# Patient Record
Sex: FEMALE | Race: WHITE | Employment: OTHER | URBAN - NONMETROPOLITAN AREA
[De-identification: names, ages, dates, MRNs, and addresses within clinical notes are randomized per-mention and may not be internally consistent; named-entity substitution may affect disease eponyms.]

---

## 2017-02-02 RX ORDER — ATORVASTATIN CALCIUM 40 MG/1
40 TABLET, FILM COATED ORAL DAILY
Qty: 30 TABLET | Refills: 11 | Status: SHIPPED | OUTPATIENT
Start: 2017-02-02 | End: 2018-02-07 | Stop reason: SDUPTHER

## 2017-02-23 ENCOUNTER — OFFICE VISIT (OUTPATIENT)
Dept: CARDIOLOGY | Age: 67
End: 2017-02-23

## 2017-02-23 ENCOUNTER — PROCEDURE VISIT (OUTPATIENT)
Dept: CARDIOLOGY | Age: 67
End: 2017-02-23

## 2017-02-23 VITALS
SYSTOLIC BLOOD PRESSURE: 98 MMHG | DIASTOLIC BLOOD PRESSURE: 56 MMHG | HEIGHT: 62 IN | WEIGHT: 117.8 LBS | RESPIRATION RATE: 18 BRPM | BODY MASS INDEX: 21.68 KG/M2 | HEART RATE: 64 BPM

## 2017-02-23 DIAGNOSIS — I10 ESSENTIAL HYPERTENSION: ICD-10-CM

## 2017-02-23 DIAGNOSIS — I51.9 LV DYSFUNCTION: ICD-10-CM

## 2017-02-23 DIAGNOSIS — I42.0 DILATED CARDIOMYOPATHY (HCC): Primary | ICD-10-CM

## 2017-02-23 DIAGNOSIS — I49.5 SICK SINUS SYNDROME (HCC): ICD-10-CM

## 2017-02-23 DIAGNOSIS — Z95.810 PRESENCE OF AUTOMATIC CARDIOVERTER/DEFIBRILLATOR (AICD): Primary | ICD-10-CM

## 2017-02-23 DIAGNOSIS — I42.8 NONISCHEMIC CARDIOMYOPATHY (HCC): ICD-10-CM

## 2017-02-23 PROCEDURE — 99213 OFFICE O/P EST LOW 20 MIN: CPT | Performed by: INTERNAL MEDICINE

## 2017-02-23 PROCEDURE — 93289 INTERROG DEVICE EVAL HEART: CPT | Performed by: INTERNAL MEDICINE

## 2017-02-23 ASSESSMENT — ENCOUNTER SYMPTOMS
CHEST TIGHTNESS: 0
VOMITING: 0
ABDOMINAL PAIN: 0
SHORTNESS OF BREATH: 0
NAUSEA: 0

## 2017-02-28 LAB — HBA1C MFR BLD: 5.3 %

## 2017-03-07 ENCOUNTER — OFFICE VISIT (OUTPATIENT)
Dept: INTERNAL MEDICINE | Age: 67
End: 2017-03-07

## 2017-03-07 VITALS
RESPIRATION RATE: 16 BRPM | HEART RATE: 78 BPM | BODY MASS INDEX: 21.71 KG/M2 | WEIGHT: 118 LBS | HEIGHT: 62 IN | DIASTOLIC BLOOD PRESSURE: 72 MMHG | SYSTOLIC BLOOD PRESSURE: 108 MMHG

## 2017-03-07 DIAGNOSIS — K55.069 SUPERIOR MESENTERIC VEIN THROMBOSIS (HCC): ICD-10-CM

## 2017-03-07 DIAGNOSIS — I42.8 NONISCHEMIC CARDIOMYOPATHY (HCC): ICD-10-CM

## 2017-03-07 DIAGNOSIS — Z23 NEED FOR VACCINATION: ICD-10-CM

## 2017-03-07 DIAGNOSIS — I49.5 SICK SINUS SYNDROME (HCC): ICD-10-CM

## 2017-03-07 DIAGNOSIS — E78.2 MIXED HYPERLIPIDEMIA: ICD-10-CM

## 2017-03-07 DIAGNOSIS — Z95.810 AUTOMATIC IMPLANTABLE CARDIOVERTER-DEFIBRILLATOR IN SITU: ICD-10-CM

## 2017-03-07 DIAGNOSIS — I34.0 MILD MITRAL REGURGITATION: ICD-10-CM

## 2017-03-07 DIAGNOSIS — I63.9 CEREBRAL INFARCTION, UNSPECIFIED MECHANISM (HCC): ICD-10-CM

## 2017-03-07 DIAGNOSIS — R73.01 ELEVATED FASTING GLUCOSE: Primary | ICD-10-CM

## 2017-03-07 PROCEDURE — G0009 ADMIN PNEUMOCOCCAL VACCINE: HCPCS | Performed by: INTERNAL MEDICINE

## 2017-03-07 PROCEDURE — 3017F COLORECTAL CA SCREEN DOC REV: CPT | Performed by: INTERNAL MEDICINE

## 2017-03-07 PROCEDURE — 1090F PRES/ABSN URINE INCON ASSESS: CPT | Performed by: INTERNAL MEDICINE

## 2017-03-07 PROCEDURE — G8598 ASA/ANTIPLAT THER USED: HCPCS | Performed by: INTERNAL MEDICINE

## 2017-03-07 PROCEDURE — 1036F TOBACCO NON-USER: CPT | Performed by: INTERNAL MEDICINE

## 2017-03-07 PROCEDURE — G8400 PT W/DXA NO RESULTS DOC: HCPCS | Performed by: INTERNAL MEDICINE

## 2017-03-07 PROCEDURE — 90670 PCV13 VACCINE IM: CPT | Performed by: INTERNAL MEDICINE

## 2017-03-07 PROCEDURE — 99214 OFFICE O/P EST MOD 30 MIN: CPT | Performed by: INTERNAL MEDICINE

## 2017-03-07 PROCEDURE — G8427 DOCREV CUR MEDS BY ELIG CLIN: HCPCS | Performed by: INTERNAL MEDICINE

## 2017-03-07 PROCEDURE — G8419 CALC BMI OUT NRM PARAM NOF/U: HCPCS | Performed by: INTERNAL MEDICINE

## 2017-03-07 PROCEDURE — 1123F ACP DISCUSS/DSCN MKR DOCD: CPT | Performed by: INTERNAL MEDICINE

## 2017-03-07 PROCEDURE — 3014F SCREEN MAMMO DOC REV: CPT | Performed by: INTERNAL MEDICINE

## 2017-03-07 PROCEDURE — 4040F PNEUMOC VAC/ADMIN/RCVD: CPT | Performed by: INTERNAL MEDICINE

## 2017-03-07 PROCEDURE — G8484 FLU IMMUNIZE NO ADMIN: HCPCS | Performed by: INTERNAL MEDICINE

## 2017-03-15 PROBLEM — R39.81 FUNCTIONAL URINARY INCONTINENCE: Status: ACTIVE | Noted: 2017-03-15

## 2017-03-16 ENCOUNTER — TELEPHONE (OUTPATIENT)
Dept: INTERNAL MEDICINE | Age: 67
End: 2017-03-16

## 2017-03-23 DIAGNOSIS — Z11.4 SCREENING FOR HIV (HUMAN IMMUNODEFICIENCY VIRUS): ICD-10-CM

## 2017-03-23 DIAGNOSIS — Z11.59 NEED FOR HEPATITIS C SCREENING TEST: ICD-10-CM

## 2017-03-23 DIAGNOSIS — R73.01 ELEVATED FASTING GLUCOSE: ICD-10-CM

## 2017-03-26 ENCOUNTER — HOSPITAL ENCOUNTER (OUTPATIENT)
Age: 67
Setting detail: OBSERVATION
Discharge: HOME | End: 2017-03-27
Attending: EMERGENCY MEDICINE | Admitting: FAMILY MEDICINE
Payer: MEDICARE

## 2017-03-26 ENCOUNTER — APPOINTMENT (OUTPATIENT)
Dept: CT IMAGING | Age: 67
End: 2017-03-26
Payer: MEDICARE

## 2017-03-26 DIAGNOSIS — K56.7 ILEUS (HCC): Primary | ICD-10-CM

## 2017-03-26 LAB
-: ABNORMAL
ABSOLUTE EOS #: 0 K/UL (ref 0–0.4)
ABSOLUTE LYMPH #: 0.7 K/UL (ref 1–4.8)
ABSOLUTE MONO #: 0.3 K/UL (ref 0.1–1.2)
AMORPHOUS: ABNORMAL
ANION GAP SERPL CALCULATED.3IONS-SCNC: 14 MMOL/L (ref 9–17)
BACTERIA: ABNORMAL
BASOPHILS # BLD: 0 % (ref 0–2)
BASOPHILS ABSOLUTE: 0 K/UL (ref 0–0.2)
BILIRUBIN URINE: NEGATIVE
BUN BLDV-MCNC: 27 MG/DL (ref 8–23)
BUN/CREAT BLD: 36 (ref 9–20)
CALCIUM SERPL-MCNC: 9.6 MG/DL (ref 8.6–10.4)
CASTS UA: ABNORMAL /LPF (ref 0–2)
CHLORIDE BLD-SCNC: 103 MMOL/L (ref 98–107)
CO2: 26 MMOL/L (ref 20–31)
COLOR: ABNORMAL
COMMENT UA: ABNORMAL
CREAT SERPL-MCNC: 0.75 MG/DL (ref 0.5–0.9)
CRYSTALS, UA: ABNORMAL /HPF
DIFFERENTIAL TYPE: ABNORMAL
DIRECT EXAM: NORMAL
EOSINOPHILS RELATIVE PERCENT: 0 % (ref 1–4)
EPITHELIAL CELLS UA: ABNORMAL /HPF (ref 0–5)
GFR AFRICAN AMERICAN: >60 ML/MIN
GFR NON-AFRICAN AMERICAN: >60 ML/MIN
GFR SERPL CREATININE-BSD FRML MDRD: ABNORMAL ML/MIN/{1.73_M2}
GFR SERPL CREATININE-BSD FRML MDRD: ABNORMAL ML/MIN/{1.73_M2}
GLUCOSE BLD-MCNC: 118 MG/DL (ref 70–99)
GLUCOSE URINE: NEGATIVE
HCT VFR BLD CALC: 43.2 % (ref 36–46)
HEMOGLOBIN: 14.1 G/DL (ref 12–16)
KETONES, URINE: ABNORMAL
LEUKOCYTE ESTERASE, URINE: ABNORMAL
LYMPHOCYTES # BLD: 6 % (ref 24–44)
Lab: NORMAL
MCH RBC QN AUTO: 30.7 PG (ref 26–34)
MCHC RBC AUTO-ENTMCNC: 32.6 G/DL (ref 31–37)
MCV RBC AUTO: 94.1 FL (ref 80–100)
MONOCYTES # BLD: 3 % (ref 1–7)
MUCUS: ABNORMAL
NITRITE, URINE: NEGATIVE
OTHER OBSERVATIONS UA: ABNORMAL
PDW BLD-RTO: 14.2 % (ref 11–14.5)
PH UA: 5.5 (ref 5–6)
PLATELET # BLD: 151 K/UL (ref 140–450)
PLATELET ESTIMATE: ABNORMAL
PMV BLD AUTO: 9.4 FL (ref 6–12)
POTASSIUM SERPL-SCNC: 4.2 MMOL/L (ref 3.7–5.3)
PROTEIN UA: ABNORMAL
RBC # BLD: 4.59 M/UL (ref 4–5.2)
RBC # BLD: ABNORMAL 10*6/UL
RBC UA: ABNORMAL /HPF (ref 0–4)
RENAL EPITHELIAL, UA: ABNORMAL /HPF
SEG NEUTROPHILS: 91 % (ref 36–66)
SEGMENTED NEUTROPHILS ABSOLUTE COUNT: 10.2 K/UL (ref 1.8–7.7)
SODIUM BLD-SCNC: 143 MMOL/L (ref 135–144)
SPECIFIC GRAVITY UA: 1.03 (ref 1.01–1.02)
SPECIMEN DESCRIPTION: NORMAL
STATUS: NORMAL
TRICHOMONAS: ABNORMAL
TURBIDITY: ABNORMAL
URINE HGB: ABNORMAL
UROBILINOGEN, URINE: NORMAL
WBC # BLD: 11.2 K/UL (ref 3.5–11)
WBC # BLD: ABNORMAL 10*3/UL
WBC UA: ABNORMAL /HPF (ref 0–4)
YEAST: ABNORMAL

## 2017-03-26 PROCEDURE — 96360 HYDRATION IV INFUSION INIT: CPT

## 2017-03-26 PROCEDURE — 74176 CT ABD & PELVIS W/O CONTRAST: CPT

## 2017-03-26 PROCEDURE — 96361 HYDRATE IV INFUSION ADD-ON: CPT

## 2017-03-26 PROCEDURE — 36415 COLL VENOUS BLD VENIPUNCTURE: CPT

## 2017-03-26 PROCEDURE — G0378 HOSPITAL OBSERVATION PER HR: HCPCS

## 2017-03-26 PROCEDURE — 85025 COMPLETE CBC W/AUTO DIFF WBC: CPT

## 2017-03-26 PROCEDURE — 87205 SMEAR GRAM STAIN: CPT

## 2017-03-26 PROCEDURE — G8979 MOBILITY GOAL STATUS: HCPCS | Performed by: PHYSICAL THERAPIST

## 2017-03-26 PROCEDURE — 97163 PT EVAL HIGH COMPLEX 45 MIN: CPT | Performed by: PHYSICAL THERAPIST

## 2017-03-26 PROCEDURE — 2580000003 HC RX 258: Performed by: FAMILY MEDICINE

## 2017-03-26 PROCEDURE — 97116 GAIT TRAINING THERAPY: CPT | Performed by: PHYSICAL THERAPIST

## 2017-03-26 PROCEDURE — G8978 MOBILITY CURRENT STATUS: HCPCS | Performed by: PHYSICAL THERAPIST

## 2017-03-26 PROCEDURE — G8988 SELF CARE GOAL STATUS: HCPCS

## 2017-03-26 PROCEDURE — 6370000000 HC RX 637 (ALT 250 FOR IP): Performed by: FAMILY MEDICINE

## 2017-03-26 PROCEDURE — 74176 CT ABD & PELVIS W/O CONTRAST: CPT | Performed by: RADIOLOGY

## 2017-03-26 PROCEDURE — 87324 CLOSTRIDIUM AG IA: CPT

## 2017-03-26 PROCEDURE — 80048 BASIC METABOLIC PNL TOTAL CA: CPT

## 2017-03-26 PROCEDURE — 2580000003 HC RX 258: Performed by: EMERGENCY MEDICINE

## 2017-03-26 PROCEDURE — 99285 EMERGENCY DEPT VISIT HI MDM: CPT

## 2017-03-26 PROCEDURE — 81001 URINALYSIS AUTO W/SCOPE: CPT

## 2017-03-26 PROCEDURE — 94760 N-INVAS EAR/PLS OXIMETRY 1: CPT

## 2017-03-26 PROCEDURE — 87070 CULTURE OTHR SPECIMN AEROBIC: CPT

## 2017-03-26 PROCEDURE — G8980 MOBILITY D/C STATUS: HCPCS | Performed by: PHYSICAL THERAPIST

## 2017-03-26 RX ORDER — ATORVASTATIN CALCIUM 40 MG/1
40 TABLET, FILM COATED ORAL DAILY
Status: DISCONTINUED | OUTPATIENT
Start: 2017-03-26 | End: 2017-03-27 | Stop reason: HOSPADM

## 2017-03-26 RX ORDER — ALBUTEROL SULFATE 90 UG/1
2 AEROSOL, METERED RESPIRATORY (INHALATION) EVERY 4 HOURS PRN
Status: DISCONTINUED | OUTPATIENT
Start: 2017-03-26 | End: 2017-03-27 | Stop reason: HOSPADM

## 2017-03-26 RX ORDER — ACETAMINOPHEN 325 MG/1
650 TABLET ORAL EVERY 4 HOURS PRN
Status: DISCONTINUED | OUTPATIENT
Start: 2017-03-26 | End: 2017-03-27 | Stop reason: HOSPADM

## 2017-03-26 RX ORDER — MECLIZINE HCL 12.5 MG/1
25 TABLET ORAL 3 TIMES DAILY PRN
Status: DISCONTINUED | OUTPATIENT
Start: 2017-03-26 | End: 2017-03-27 | Stop reason: HOSPADM

## 2017-03-26 RX ORDER — SODIUM CHLORIDE 9 MG/ML
INJECTION, SOLUTION INTRAVENOUS CONTINUOUS
Status: DISCONTINUED | OUTPATIENT
Start: 2017-03-26 | End: 2017-03-27 | Stop reason: HOSPADM

## 2017-03-26 RX ORDER — SODIUM CHLORIDE 0.9 % (FLUSH) 0.9 %
10 SYRINGE (ML) INJECTION EVERY 12 HOURS SCHEDULED
Status: DISCONTINUED | OUTPATIENT
Start: 2017-03-26 | End: 2017-03-27 | Stop reason: HOSPADM

## 2017-03-26 RX ORDER — MORPHINE SULFATE 4 MG/ML
4 INJECTION, SOLUTION INTRAMUSCULAR; INTRAVENOUS
Status: DISCONTINUED | OUTPATIENT
Start: 2017-03-26 | End: 2017-03-27 | Stop reason: HOSPADM

## 2017-03-26 RX ORDER — ASPIRIN 81 MG/1
81 TABLET ORAL DAILY
Status: DISCONTINUED | OUTPATIENT
Start: 2017-03-26 | End: 2017-03-27 | Stop reason: HOSPADM

## 2017-03-26 RX ORDER — ONDANSETRON 2 MG/ML
4 INJECTION INTRAMUSCULAR; INTRAVENOUS EVERY 6 HOURS PRN
Status: DISCONTINUED | OUTPATIENT
Start: 2017-03-26 | End: 2017-03-27 | Stop reason: HOSPADM

## 2017-03-26 RX ORDER — 0.9 % SODIUM CHLORIDE 0.9 %
1000 INTRAVENOUS SOLUTION INTRAVENOUS ONCE
Status: COMPLETED | OUTPATIENT
Start: 2017-03-26 | End: 2017-03-26

## 2017-03-26 RX ORDER — SODIUM CHLORIDE 0.9 % (FLUSH) 0.9 %
10 SYRINGE (ML) INJECTION PRN
Status: DISCONTINUED | OUTPATIENT
Start: 2017-03-26 | End: 2017-03-27 | Stop reason: HOSPADM

## 2017-03-26 RX ORDER — MORPHINE SULFATE 2 MG/ML
2 INJECTION, SOLUTION INTRAMUSCULAR; INTRAVENOUS
Status: DISCONTINUED | OUTPATIENT
Start: 2017-03-26 | End: 2017-03-27 | Stop reason: HOSPADM

## 2017-03-26 RX ADMIN — SODIUM CHLORIDE: 9 INJECTION, SOLUTION INTRAVENOUS at 14:23

## 2017-03-26 RX ADMIN — ASPIRIN 81 MG: 81 TABLET, COATED ORAL at 14:18

## 2017-03-26 RX ADMIN — RIVAROXABAN 20 MG: 10 TABLET, FILM COATED ORAL at 14:18

## 2017-03-26 RX ADMIN — SODIUM CHLORIDE 1000 ML: 9 INJECTION, SOLUTION INTRAVENOUS at 10:50

## 2017-03-26 RX ADMIN — SODIUM CHLORIDE: 9 INJECTION, SOLUTION INTRAVENOUS at 23:09

## 2017-03-26 RX ADMIN — ATORVASTATIN CALCIUM 40 MG: 40 TABLET, FILM COATED ORAL at 20:21

## 2017-03-26 ASSESSMENT — ENCOUNTER SYMPTOMS: DIARRHEA: 1

## 2017-03-27 ENCOUNTER — APPOINTMENT (OUTPATIENT)
Dept: GENERAL RADIOLOGY | Age: 67
End: 2017-03-27
Payer: MEDICARE

## 2017-03-27 VITALS
OXYGEN SATURATION: 100 % | SYSTOLIC BLOOD PRESSURE: 126 MMHG | TEMPERATURE: 97.2 F | HEIGHT: 62 IN | BODY MASS INDEX: 21.92 KG/M2 | HEART RATE: 77 BPM | DIASTOLIC BLOOD PRESSURE: 61 MMHG | WEIGHT: 119.1 LBS | RESPIRATION RATE: 16 BRPM

## 2017-03-27 LAB
ABSOLUTE EOS #: 0 K/UL (ref 0–0.4)
ABSOLUTE LYMPH #: 1.1 K/UL (ref 1–4.8)
ABSOLUTE MONO #: 0.3 K/UL (ref 0.1–1.2)
ANION GAP SERPL CALCULATED.3IONS-SCNC: 12 MMOL/L (ref 9–17)
BASOPHILS # BLD: 0 % (ref 0–2)
BASOPHILS ABSOLUTE: 0 K/UL (ref 0–0.2)
BUN BLDV-MCNC: 18 MG/DL (ref 8–23)
BUN/CREAT BLD: 32 (ref 9–20)
CALCIUM SERPL-MCNC: 8.4 MG/DL (ref 8.6–10.4)
CHLORIDE BLD-SCNC: 108 MMOL/L (ref 98–107)
CO2: 22 MMOL/L (ref 20–31)
CREAT SERPL-MCNC: 0.57 MG/DL (ref 0.5–0.9)
DIFFERENTIAL TYPE: ABNORMAL
EOSINOPHILS RELATIVE PERCENT: 1 % (ref 1–4)
GFR AFRICAN AMERICAN: >60 ML/MIN
GFR NON-AFRICAN AMERICAN: >60 ML/MIN
GFR SERPL CREATININE-BSD FRML MDRD: ABNORMAL ML/MIN/{1.73_M2}
GFR SERPL CREATININE-BSD FRML MDRD: ABNORMAL ML/MIN/{1.73_M2}
GLUCOSE BLD-MCNC: 85 MG/DL (ref 70–99)
HCT VFR BLD CALC: 34.8 % (ref 36–46)
HEMOGLOBIN: 11.2 G/DL (ref 12–16)
LYMPHOCYTES # BLD: 21 % (ref 24–44)
MCH RBC QN AUTO: 30.9 PG (ref 26–34)
MCHC RBC AUTO-ENTMCNC: 32.4 G/DL (ref 31–37)
MCV RBC AUTO: 95.5 FL (ref 80–100)
MONOCYTES # BLD: 6 % (ref 1–7)
PDW BLD-RTO: 14.4 % (ref 11–14.5)
PLATELET # BLD: 135 K/UL (ref 140–450)
PLATELET ESTIMATE: ABNORMAL
PMV BLD AUTO: 10 FL (ref 6–12)
POTASSIUM SERPL-SCNC: 4.2 MMOL/L (ref 3.7–5.3)
RBC # BLD: 3.64 M/UL (ref 4–5.2)
RBC # BLD: ABNORMAL 10*6/UL
SEG NEUTROPHILS: 72 % (ref 36–66)
SEGMENTED NEUTROPHILS ABSOLUTE COUNT: 3.8 K/UL (ref 1.8–7.7)
SODIUM BLD-SCNC: 142 MMOL/L (ref 135–144)
WBC # BLD: 5.3 K/UL (ref 3.5–11)
WBC # BLD: ABNORMAL 10*3/UL

## 2017-03-27 PROCEDURE — 74022 RADEX COMPL AQT ABD SERIES: CPT

## 2017-03-27 PROCEDURE — 6370000000 HC RX 637 (ALT 250 FOR IP): Performed by: FAMILY MEDICINE

## 2017-03-27 PROCEDURE — G8987 SELF CARE CURRENT STATUS: HCPCS | Performed by: OCCUPATIONAL THERAPIST

## 2017-03-27 PROCEDURE — 2580000003 HC RX 258: Performed by: FAMILY MEDICINE

## 2017-03-27 PROCEDURE — 97165 OT EVAL LOW COMPLEX 30 MIN: CPT | Performed by: OCCUPATIONAL THERAPIST

## 2017-03-27 PROCEDURE — 36415 COLL VENOUS BLD VENIPUNCTURE: CPT

## 2017-03-27 PROCEDURE — G9162 LANG EXPRESS CURRENT STATUS: HCPCS | Performed by: SPEECH-LANGUAGE PATHOLOGIST

## 2017-03-27 PROCEDURE — 80048 BASIC METABOLIC PNL TOTAL CA: CPT

## 2017-03-27 PROCEDURE — 74022 RADEX COMPL AQT ABD SERIES: CPT | Performed by: RADIOLOGY

## 2017-03-27 PROCEDURE — 99217 PR OBSERVATION CARE DISCHARGE MANAGEMENT: CPT | Performed by: FAMILY MEDICINE

## 2017-03-27 PROCEDURE — G8988 SELF CARE GOAL STATUS: HCPCS

## 2017-03-27 PROCEDURE — G9164 LANG EXPRESS D/C STATUS: HCPCS | Performed by: SPEECH-LANGUAGE PATHOLOGIST

## 2017-03-27 PROCEDURE — 92523 SPEECH SOUND LANG COMPREHEN: CPT | Performed by: SPEECH-LANGUAGE PATHOLOGIST

## 2017-03-27 PROCEDURE — G0378 HOSPITAL OBSERVATION PER HR: HCPCS

## 2017-03-27 PROCEDURE — 85025 COMPLETE CBC W/AUTO DIFF WBC: CPT

## 2017-03-27 PROCEDURE — G9163 LANG EXPRESS GOAL STATUS: HCPCS

## 2017-03-27 PROCEDURE — 94760 N-INVAS EAR/PLS OXIMETRY 1: CPT

## 2017-03-27 PROCEDURE — G8989 SELF CARE D/C STATUS: HCPCS | Performed by: OCCUPATIONAL THERAPIST

## 2017-03-27 RX ADMIN — ASPIRIN 81 MG: 81 TABLET, COATED ORAL at 09:28

## 2017-03-27 RX ADMIN — SODIUM CHLORIDE: 9 INJECTION, SOLUTION INTRAVENOUS at 05:58

## 2017-03-27 RX ADMIN — ATORVASTATIN CALCIUM 40 MG: 40 TABLET, FILM COATED ORAL at 09:30

## 2017-03-27 RX ADMIN — RIVAROXABAN 20 MG: 10 TABLET, FILM COATED ORAL at 09:28

## 2017-03-27 ASSESSMENT — PAIN SCALES - GENERAL
PAINLEVEL_OUTOF10: 0

## 2017-03-28 ENCOUNTER — TELEPHONE (OUTPATIENT)
Dept: INTERNAL MEDICINE | Age: 67
End: 2017-03-28

## 2017-04-02 LAB
CULTURE: NORMAL
CULTURE: NORMAL
DIRECT EXAM: NORMAL
Lab: NORMAL
SPECIMEN DESCRIPTION: NORMAL
SPECIMEN DESCRIPTION: NORMAL
STATUS: NORMAL

## 2017-04-03 ENCOUNTER — OFFICE VISIT (OUTPATIENT)
Dept: INTERNAL MEDICINE | Age: 67
End: 2017-04-03
Payer: MEDICARE

## 2017-04-03 VITALS
DIASTOLIC BLOOD PRESSURE: 86 MMHG | RESPIRATION RATE: 18 BRPM | HEIGHT: 62 IN | BODY MASS INDEX: 21.38 KG/M2 | SYSTOLIC BLOOD PRESSURE: 126 MMHG | WEIGHT: 116.2 LBS | TEMPERATURE: 97.9 F | HEART RATE: 60 BPM

## 2017-04-03 DIAGNOSIS — R19.7 DIARRHEA, UNSPECIFIED TYPE: Primary | ICD-10-CM

## 2017-04-03 DIAGNOSIS — K56.7 ILEUS (HCC): ICD-10-CM

## 2017-04-03 PROCEDURE — 99495 TRANSJ CARE MGMT MOD F2F 14D: CPT | Performed by: INTERNAL MEDICINE

## 2017-07-02 ENCOUNTER — APPOINTMENT (OUTPATIENT)
Dept: CT IMAGING | Age: 67
End: 2017-07-02
Payer: MEDICARE

## 2017-07-02 ENCOUNTER — HOSPITAL ENCOUNTER (EMERGENCY)
Age: 67
Discharge: HOME | End: 2017-07-02
Attending: EMERGENCY MEDICINE
Payer: MEDICARE

## 2017-07-02 ENCOUNTER — APPOINTMENT (OUTPATIENT)
Dept: GENERAL RADIOLOGY | Age: 67
End: 2017-07-02
Payer: MEDICARE

## 2017-07-02 VITALS
SYSTOLIC BLOOD PRESSURE: 138 MMHG | TEMPERATURE: 97.3 F | HEART RATE: 72 BPM | RESPIRATION RATE: 18 BRPM | DIASTOLIC BLOOD PRESSURE: 66 MMHG | OXYGEN SATURATION: 98 %

## 2017-07-02 DIAGNOSIS — N39.0 URINARY TRACT INFECTION, SITE UNSPECIFIED: ICD-10-CM

## 2017-07-02 DIAGNOSIS — R42 DIZZINESS: Primary | ICD-10-CM

## 2017-07-02 LAB
-: ABNORMAL
ABSOLUTE EOS #: 0.1 K/UL (ref 0–0.4)
ABSOLUTE LYMPH #: 1.8 K/UL (ref 1–4.8)
ABSOLUTE MONO #: 0.3 K/UL (ref 0.1–1.2)
AMORPHOUS: ABNORMAL
ANION GAP SERPL CALCULATED.3IONS-SCNC: 11 MMOL/L (ref 9–17)
BACTERIA: ABNORMAL
BASOPHILS # BLD: 0 %
BASOPHILS ABSOLUTE: 0 K/UL (ref 0–0.2)
BILIRUBIN URINE: NEGATIVE
BUN BLDV-MCNC: 23 MG/DL (ref 8–23)
BUN/CREAT BLD: 29 (ref 9–20)
CALCIUM SERPL-MCNC: 9.7 MG/DL (ref 8.6–10.4)
CASTS UA: ABNORMAL /LPF (ref 0–2)
CHLORIDE BLD-SCNC: 102 MMOL/L (ref 98–107)
CO2: 29 MMOL/L (ref 20–31)
COLOR: ABNORMAL
COMMENT UA: ABNORMAL
CREAT SERPL-MCNC: 0.8 MG/DL (ref 0.5–0.9)
CRYSTALS, UA: ABNORMAL /HPF
DIFFERENTIAL TYPE: NORMAL
EKG ATRIAL RATE: 70 BPM
EKG P AXIS: 9 DEGREES
EKG P-R INTERVAL: 226 MS
EKG Q-T INTERVAL: 438 MS
EKG QRS DURATION: 84 MS
EKG QTC CALCULATION (BAZETT): 473 MS
EKG R AXIS: 21 DEGREES
EKG T AXIS: 57 DEGREES
EKG VENTRICULAR RATE: 70 BPM
EOSINOPHILS RELATIVE PERCENT: 2 %
EPITHELIAL CELLS UA: ABNORMAL /HPF (ref 0–5)
GFR AFRICAN AMERICAN: >60 ML/MIN
GFR NON-AFRICAN AMERICAN: >60 ML/MIN
GFR SERPL CREATININE-BSD FRML MDRD: ABNORMAL ML/MIN/{1.73_M2}
GFR SERPL CREATININE-BSD FRML MDRD: ABNORMAL ML/MIN/{1.73_M2}
GLUCOSE BLD-MCNC: 94 MG/DL (ref 70–99)
GLUCOSE URINE: NEGATIVE
HCT VFR BLD CALC: 41.6 % (ref 36–46)
HEMOGLOBIN: 13.7 G/DL (ref 12–16)
KETONES, URINE: NEGATIVE
LEUKOCYTE ESTERASE, URINE: ABNORMAL
LYMPHOCYTES # BLD: 29 %
MCH RBC QN AUTO: 30.9 PG (ref 26–34)
MCHC RBC AUTO-ENTMCNC: 33 G/DL (ref 31–37)
MCV RBC AUTO: 93.5 FL (ref 80–100)
MONOCYTES # BLD: 5 %
MUCUS: ABNORMAL
NITRITE, URINE: NEGATIVE
OTHER OBSERVATIONS UA: ABNORMAL
PDW BLD-RTO: 13.7 % (ref 11–14.5)
PH UA: 5.5 (ref 5–6)
PLATELET # BLD: 142 K/UL (ref 140–450)
PLATELET ESTIMATE: NORMAL
PMV BLD AUTO: 8.4 FL (ref 6–12)
POTASSIUM SERPL-SCNC: 4.3 MMOL/L (ref 3.7–5.3)
PROTEIN UA: NEGATIVE
RBC # BLD: 4.45 M/UL (ref 4–5.2)
RBC # BLD: NORMAL 10*6/UL
RBC UA: ABNORMAL /HPF (ref 0–4)
RENAL EPITHELIAL, UA: ABNORMAL /HPF
SEG NEUTROPHILS: 64 %
SEGMENTED NEUTROPHILS ABSOLUTE COUNT: 3.8 K/UL (ref 1.8–7.7)
SODIUM BLD-SCNC: 142 MMOL/L (ref 135–144)
SPECIFIC GRAVITY UA: 1.02 (ref 1.01–1.02)
TRICHOMONAS: ABNORMAL
TROPONIN INTERP: NORMAL
TROPONIN T: <0.03 NG/ML
TURBIDITY: ABNORMAL
URINE HGB: NEGATIVE
UROBILINOGEN, URINE: NORMAL
WBC # BLD: 6 K/UL (ref 3.5–11)
WBC # BLD: NORMAL 10*3/UL
WBC UA: ABNORMAL /HPF (ref 0–4)
YEAST: ABNORMAL

## 2017-07-02 PROCEDURE — 71010 XR CHEST PORTABLE: CPT | Performed by: RADIOLOGY

## 2017-07-02 PROCEDURE — 99284 EMERGENCY DEPT VISIT MOD MDM: CPT

## 2017-07-02 PROCEDURE — 71010 XR CHEST PORTABLE: CPT

## 2017-07-02 PROCEDURE — 87086 URINE CULTURE/COLONY COUNT: CPT

## 2017-07-02 PROCEDURE — 80048 BASIC METABOLIC PNL TOTAL CA: CPT

## 2017-07-02 PROCEDURE — 93005 ELECTROCARDIOGRAM TRACING: CPT

## 2017-07-02 PROCEDURE — 6370000000 HC RX 637 (ALT 250 FOR IP): Performed by: EMERGENCY MEDICINE

## 2017-07-02 PROCEDURE — 85025 COMPLETE CBC W/AUTO DIFF WBC: CPT

## 2017-07-02 PROCEDURE — 36415 COLL VENOUS BLD VENIPUNCTURE: CPT

## 2017-07-02 PROCEDURE — 81001 URINALYSIS AUTO W/SCOPE: CPT

## 2017-07-02 PROCEDURE — 84484 ASSAY OF TROPONIN QUANT: CPT

## 2017-07-02 PROCEDURE — 70450 CT HEAD/BRAIN W/O DYE: CPT

## 2017-07-02 PROCEDURE — 70450 CT HEAD/BRAIN W/O DYE: CPT | Performed by: RADIOLOGY

## 2017-07-02 RX ORDER — MECLIZINE HCL 12.5 MG/1
25 TABLET ORAL ONCE
Status: COMPLETED | OUTPATIENT
Start: 2017-07-02 | End: 2017-07-02

## 2017-07-02 RX ORDER — MECLIZINE HYDROCHLORIDE 25 MG/1
25 TABLET ORAL 2 TIMES DAILY PRN
Qty: 14 TABLET | Refills: 0 | Status: SHIPPED | OUTPATIENT
Start: 2017-07-02

## 2017-07-02 RX ORDER — SULFAMETHOXAZOLE AND TRIMETHOPRIM 800; 160 MG/1; MG/1
1 TABLET ORAL 2 TIMES DAILY
Qty: 14 TABLET | Refills: 0 | Status: SHIPPED | OUTPATIENT
Start: 2017-07-02 | End: 2017-07-09

## 2017-07-02 RX ADMIN — MECLIZINE 25 MG: 12.5 TABLET ORAL at 09:21

## 2017-07-03 LAB
CULTURE: NORMAL
CULTURE: NORMAL
Lab: NORMAL
SPECIMEN DESCRIPTION: NORMAL
SPECIMEN DESCRIPTION: NORMAL
STATUS: NORMAL

## 2017-08-24 ENCOUNTER — OFFICE VISIT (OUTPATIENT)
Dept: CARDIOLOGY | Age: 67
End: 2017-08-24
Payer: MEDICARE

## 2017-08-24 ENCOUNTER — PROCEDURE VISIT (OUTPATIENT)
Dept: CARDIOLOGY | Age: 67
End: 2017-08-24
Payer: MEDICARE

## 2017-08-24 VITALS
HEART RATE: 72 BPM | HEIGHT: 62 IN | BODY MASS INDEX: 21.71 KG/M2 | WEIGHT: 118 LBS | SYSTOLIC BLOOD PRESSURE: 110 MMHG | DIASTOLIC BLOOD PRESSURE: 64 MMHG

## 2017-08-24 DIAGNOSIS — I51.9 LV DYSFUNCTION: ICD-10-CM

## 2017-08-24 DIAGNOSIS — I49.5 SICK SINUS SYNDROME (HCC): ICD-10-CM

## 2017-08-24 DIAGNOSIS — E78.2 MIXED HYPERLIPIDEMIA: Primary | ICD-10-CM

## 2017-08-24 DIAGNOSIS — I42.0 DILATED CARDIOMYOPATHY (HCC): ICD-10-CM

## 2017-08-24 DIAGNOSIS — Z95.810 AUTOMATIC IMPLANTABLE CARDIOVERTER-DEFIBRILLATOR IN SITU: Primary | ICD-10-CM

## 2017-08-24 PROCEDURE — 99213 OFFICE O/P EST LOW 20 MIN: CPT | Performed by: INTERNAL MEDICINE

## 2017-08-24 PROCEDURE — 1090F PRES/ABSN URINE INCON ASSESS: CPT | Performed by: INTERNAL MEDICINE

## 2017-08-24 PROCEDURE — 1123F ACP DISCUSS/DSCN MKR DOCD: CPT | Performed by: INTERNAL MEDICINE

## 2017-08-24 PROCEDURE — G8400 PT W/DXA NO RESULTS DOC: HCPCS | Performed by: INTERNAL MEDICINE

## 2017-08-24 PROCEDURE — 1036F TOBACCO NON-USER: CPT | Performed by: INTERNAL MEDICINE

## 2017-08-24 PROCEDURE — 3017F COLORECTAL CA SCREEN DOC REV: CPT | Performed by: INTERNAL MEDICINE

## 2017-08-24 PROCEDURE — 3014F SCREEN MAMMO DOC REV: CPT | Performed by: INTERNAL MEDICINE

## 2017-08-24 PROCEDURE — G8427 DOCREV CUR MEDS BY ELIG CLIN: HCPCS | Performed by: INTERNAL MEDICINE

## 2017-08-24 PROCEDURE — 4040F PNEUMOC VAC/ADMIN/RCVD: CPT | Performed by: INTERNAL MEDICINE

## 2017-08-24 PROCEDURE — G8598 ASA/ANTIPLAT THER USED: HCPCS | Performed by: INTERNAL MEDICINE

## 2017-08-24 PROCEDURE — G8420 CALC BMI NORM PARAMETERS: HCPCS | Performed by: INTERNAL MEDICINE

## 2017-08-24 PROCEDURE — 93289 INTERROG DEVICE EVAL HEART: CPT | Performed by: INTERNAL MEDICINE

## 2017-09-28 ENCOUNTER — HOSPITAL ENCOUNTER (EMERGENCY)
Age: 67
Discharge: HOME | End: 2017-09-28
Attending: EMERGENCY MEDICINE
Payer: MEDICARE

## 2017-09-28 ENCOUNTER — APPOINTMENT (OUTPATIENT)
Dept: CT IMAGING | Age: 67
End: 2017-09-28
Payer: MEDICARE

## 2017-09-28 VITALS
SYSTOLIC BLOOD PRESSURE: 93 MMHG | OXYGEN SATURATION: 96 % | HEART RATE: 74 BPM | TEMPERATURE: 97.9 F | RESPIRATION RATE: 12 BRPM | WEIGHT: 116 LBS | DIASTOLIC BLOOD PRESSURE: 55 MMHG | BODY MASS INDEX: 21.35 KG/M2

## 2017-09-28 DIAGNOSIS — R51.9 NONINTRACTABLE EPISODIC HEADACHE, UNSPECIFIED HEADACHE TYPE: Primary | ICD-10-CM

## 2017-09-28 PROCEDURE — 99284 EMERGENCY DEPT VISIT MOD MDM: CPT

## 2017-09-28 PROCEDURE — 6370000000 HC RX 637 (ALT 250 FOR IP): Performed by: EMERGENCY MEDICINE

## 2017-09-28 PROCEDURE — 70450 CT HEAD/BRAIN W/O DYE: CPT

## 2017-09-28 RX ORDER — ACETAMINOPHEN 325 MG/1
650 TABLET ORAL ONCE
Status: COMPLETED | OUTPATIENT
Start: 2017-09-28 | End: 2017-09-28

## 2017-09-28 RX ADMIN — ACETAMINOPHEN 650 MG: 325 TABLET ORAL at 22:40

## 2017-09-28 ASSESSMENT — PAIN DESCRIPTION - PROGRESSION: CLINICAL_PROGRESSION: NOT CHANGED

## 2017-09-28 ASSESSMENT — PAIN DESCRIPTION - FREQUENCY: FREQUENCY: CONTINUOUS

## 2017-09-28 ASSESSMENT — PAIN SCALES - GENERAL
PAINLEVEL_OUTOF10: 8
PAINLEVEL_OUTOF10: 8
PAINLEVEL_OUTOF10: 5

## 2017-09-28 ASSESSMENT — PAIN DESCRIPTION - PAIN TYPE: TYPE: ACUTE PAIN

## 2017-09-28 ASSESSMENT — PAIN DESCRIPTION - DESCRIPTORS: DESCRIPTORS: ACHING

## 2017-09-28 ASSESSMENT — PAIN DESCRIPTION - LOCATION: LOCATION: HEAD

## 2017-09-28 ASSESSMENT — PAIN DESCRIPTION - ONSET: ONSET: SUDDEN

## 2017-09-28 ASSESSMENT — PAIN DESCRIPTION - ORIENTATION: ORIENTATION: OTHER (COMMENT)

## 2017-10-04 ENCOUNTER — TELEPHONE (OUTPATIENT)
Dept: MAMMOGRAPHY | Age: 67
End: 2017-10-04

## 2017-10-04 DIAGNOSIS — Z12.31 SCREENING MAMMOGRAM, ENCOUNTER FOR: Primary | ICD-10-CM

## 2017-10-04 NOTE — TELEPHONE ENCOUNTER
Could you please put in a screening mammogram order.  [id number] Thank you  The other order in [facility] has an expected date of Oct 2016

## 2017-10-09 RX ORDER — RIVAROXABAN 20 MG/1
TABLET, FILM COATED ORAL
Qty: 30 TABLET | Refills: 11 | Status: SHIPPED | OUTPATIENT
Start: 2017-10-09 | End: 2018-01-01 | Stop reason: SDUPTHER

## 2017-10-10 ENCOUNTER — OFFICE VISIT (OUTPATIENT)
Dept: INTERNAL MEDICINE | Age: 67
End: 2017-10-10
Payer: MEDICARE

## 2017-10-10 ENCOUNTER — OFFICE VISIT (OUTPATIENT)
Dept: OBGYN | Age: 67
End: 2017-10-10
Payer: MEDICARE

## 2017-10-10 ENCOUNTER — HOSPITAL ENCOUNTER (OUTPATIENT)
Dept: MAMMOGRAPHY | Age: 67
Discharge: HOME | End: 2017-10-10
Payer: MEDICARE

## 2017-10-10 ENCOUNTER — HOSPITAL ENCOUNTER (OUTPATIENT)
Age: 67
Setting detail: SPECIMEN
Discharge: HOME | End: 2017-10-10
Payer: MEDICARE

## 2017-10-10 VITALS
HEART RATE: 64 BPM | HEIGHT: 62 IN | WEIGHT: 120.2 LBS | DIASTOLIC BLOOD PRESSURE: 62 MMHG | SYSTOLIC BLOOD PRESSURE: 118 MMHG | BODY MASS INDEX: 22.12 KG/M2

## 2017-10-10 VITALS
WEIGHT: 120.2 LBS | RESPIRATION RATE: 18 BRPM | BODY MASS INDEX: 22.12 KG/M2 | HEIGHT: 62 IN | DIASTOLIC BLOOD PRESSURE: 70 MMHG | HEART RATE: 80 BPM | SYSTOLIC BLOOD PRESSURE: 118 MMHG

## 2017-10-10 DIAGNOSIS — R47.01 NONVERBAL: ICD-10-CM

## 2017-10-10 DIAGNOSIS — I42.8 NONISCHEMIC CARDIOMYOPATHY (HCC): ICD-10-CM

## 2017-10-10 DIAGNOSIS — R73.01 ELEVATED FASTING GLUCOSE: Primary | ICD-10-CM

## 2017-10-10 DIAGNOSIS — Z78.0 ASYMPTOMATIC MENOPAUSAL STATE: ICD-10-CM

## 2017-10-10 DIAGNOSIS — Z95.810 AUTOMATIC IMPLANTABLE CARDIOVERTER-DEFIBRILLATOR IN SITU: ICD-10-CM

## 2017-10-10 DIAGNOSIS — H61.23 IMPACTED CERUMEN OF BOTH EARS: ICD-10-CM

## 2017-10-10 DIAGNOSIS — I63.9 CEREBRAL INFARCTION, UNSPECIFIED MECHANISM (HCC): ICD-10-CM

## 2017-10-10 DIAGNOSIS — Z01.419 ROUTINE GYNECOLOGICAL EXAMINATION: Primary | ICD-10-CM

## 2017-10-10 DIAGNOSIS — R82.90 ABNORMAL URINE ODOR: ICD-10-CM

## 2017-10-10 DIAGNOSIS — R42 DIZZINESS: ICD-10-CM

## 2017-10-10 DIAGNOSIS — R53.1 GENERAL WEAKNESS: ICD-10-CM

## 2017-10-10 DIAGNOSIS — R13.10 DYSPHAGIA, UNSPECIFIED TYPE: ICD-10-CM

## 2017-10-10 DIAGNOSIS — N89.8 VAGINAL DISCHARGE: ICD-10-CM

## 2017-10-10 DIAGNOSIS — Z12.31 SCREENING MAMMOGRAM, ENCOUNTER FOR: ICD-10-CM

## 2017-10-10 DIAGNOSIS — E78.2 MIXED HYPERLIPIDEMIA: ICD-10-CM

## 2017-10-10 PROCEDURE — 99214 OFFICE O/P EST MOD 30 MIN: CPT | Performed by: NURSE PRACTITIONER

## 2017-10-10 PROCEDURE — G8598 ASA/ANTIPLAT THER USED: HCPCS | Performed by: NURSE PRACTITIONER

## 2017-10-10 PROCEDURE — 3014F SCREEN MAMMO DOC REV: CPT | Performed by: NURSE PRACTITIONER

## 2017-10-10 PROCEDURE — G8427 DOCREV CUR MEDS BY ELIG CLIN: HCPCS | Performed by: NURSE PRACTITIONER

## 2017-10-10 PROCEDURE — G0101 CA SCREEN;PELVIC/BREAST EXAM: HCPCS | Performed by: NURSE PRACTITIONER

## 2017-10-10 PROCEDURE — 69210 REMOVE IMPACTED EAR WAX UNI: CPT | Performed by: NURSE PRACTITIONER

## 2017-10-10 PROCEDURE — 4040F PNEUMOC VAC/ADMIN/RCVD: CPT | Performed by: NURSE PRACTITIONER

## 2017-10-10 PROCEDURE — 3017F COLORECTAL CA SCREEN DOC REV: CPT | Performed by: NURSE PRACTITIONER

## 2017-10-10 PROCEDURE — 1036F TOBACCO NON-USER: CPT | Performed by: NURSE PRACTITIONER

## 2017-10-10 PROCEDURE — G8400 PT W/DXA NO RESULTS DOC: HCPCS | Performed by: NURSE PRACTITIONER

## 2017-10-10 PROCEDURE — G8484 FLU IMMUNIZE NO ADMIN: HCPCS | Performed by: NURSE PRACTITIONER

## 2017-10-10 PROCEDURE — 1123F ACP DISCUSS/DSCN MKR DOCD: CPT | Performed by: NURSE PRACTITIONER

## 2017-10-10 PROCEDURE — G8420 CALC BMI NORM PARAMETERS: HCPCS | Performed by: NURSE PRACTITIONER

## 2017-10-10 PROCEDURE — 1090F PRES/ABSN URINE INCON ASSESS: CPT | Performed by: NURSE PRACTITIONER

## 2017-10-10 PROCEDURE — 87070 CULTURE OTHR SPECIMN AEROBIC: CPT

## 2017-10-10 PROCEDURE — G0202 SCR MAMMO BI INCL CAD: HCPCS

## 2017-10-10 ASSESSMENT — PATIENT HEALTH QUESTIONNAIRE - PHQ9
2. FEELING DOWN, DEPRESSED OR HOPELESS: 0
SUM OF ALL RESPONSES TO PHQ9 QUESTIONS 1 & 2: 0
SUM OF ALL RESPONSES TO PHQ QUESTIONS 1-9: 0
1. LITTLE INTEREST OR PLEASURE IN DOING THINGS: 0

## 2017-10-10 NOTE — PROGRESS NOTES
Visit Information    Have you changed or started any medications since your last visit including any over-the-counter medicines, vitamins, or herbal medicines? no   Are you having any side effects from any of your medications? -  no  Have you stopped taking any of your medications? Is so, why? -  no    Have you seen any other physician or provider since your last visit? Yes - Records Obtained  Have you had any other diagnostic tests since your last visit? Yes - Records Obtained  Have you been seen in the emergency room and/or had an admission to a hospital since we last saw you? Yes - Records Obtained  Have you had your routine dental cleaning in the past 6 months? no    Have you activated your [employer] account? If not, what are your barriers?  No: pending     Patient Care Team:  Lexie Holliday DO as PCP - General (Internal Medicine)    Medical History Review  Past Medical, Family, and Social History reviewed and does contribute to the patient presenting condition    Health Maintenance   Topic Date Due    DTaP/Tdap/Td vaccine (1 - Tdap) 03/04/1971    Colon cancer screen colonoscopy  03/04/2002    Zostavax vaccine  03/04/2012    DEXA (modify frequency per FRAX score)  03/04/2017    Flu vaccine (1) 09/01/2017    Breast cancer screen  10/14/2017    Pneumococcal low/med risk (2 of 2 - PPSV23) 09/21/2019    Lipid screen  09/02/2021    Hepatitis C screen  Completed    HIV screen  Completed

## 2017-10-10 NOTE — PROGRESS NOTES
[facility]  10/10/2017              74 y.o. Chief Complaint   Patient presents with    Gynecologic Exam     annual exam and pap         No LMP recorded. Patient has had a hysterectomy. No referring provider defined for this encounter. HPI :Annual Exam  Patient presents for annual exam.  Accompanied by sister. Patient unable to communicate since CVA. Weight loss has stabilized. Has been battling diarrhea and UTI's. The patient is not sexually active. last pap: was normal, last mammogram: was normal  The patient has regular exercise: no . We did review the need for and frequency of both weight bearing and strengthening as tolerated by the patient. ________________________________________________________________________  Obstetric History       T0      L0     SAB0   TAB0   Ectopic0   Molar0   Multiple0   Live Births0         Past Medical History:   Diagnosis Date    Anxiety     Blurred vision, bilateral     mild.  Bradycardia     intolerant of beta blockers, intolerant of ACE inhibitors.  Chronic renal insufficiency     Constipation     Depression     Elevated fasting glucose     Encephalomalacia     parietal.     Fibrocystic breast disease     Frontal headache     bilateral, mild, transient.  Genital atrophy of female     Grief reaction     death of mother.  Hiatal hernia     extremely large.  History of tobacco abuse     now quit.  Hyperlipidemia     Mild mitral regurgitation     Nonischemic cardiomyopathy (HCC)     ejection fraction less than 30%, implantation of ICD.  Overweight     Paraesophageal hiatal hernia     Sick [facility] syndrome ([facility] [address]     Stroke ([facility] [address] 2006    acute, history of stroke 2001 with no significant neurological deficit, thought to be due to transient atrial fibrillation, right sided hemiparesis, aphasia.  Superior mesenteric vein thrombosis     Tinnitus of right ear     constant, since . Past Surgical History:   Procedure Laterality Date    CARDIAC CATHETERIZATION  01/10/2008    significant cardiomyopathy, ejection fraction 30%, global hypokinesis, sinus bradycardia into the 40s transiently, frequent PVCs, otherwise normal.     CARDIAC DEFIBRILLATOR PLACEMENT  04/11/2008    ICD in situ.  GASTROSTOMY TUBE PLACEMENT  8-    attempted peg tube, unsuccessful    HYSTERECTOMY, VAGINAL  09/22/1993    total with BSO for benign tumors.  ILEOSTOMY OR JEJUNOSTOMY  08/30/2013    jejunostomy    LAPAROSCOPY  05/14/1982    with D&C.  STOMACH SURGERY  2015    gastropexy with G-tube holding in place     Family History   Problem Relation Age of Onset    Breast Cancer Mother 62    Cancer Mother      pancreatic    Diabetes Mother      \"borderline\"    High Blood Pressure Mother     Mental Illness Mother     Cancer Father      laryngeal    Diabetes Father     Stroke Maternal Grandmother     Diabetes Maternal Grandmother     Stroke Maternal Grandfather     Diabetes Maternal Grandfather     Stomach Cancer Paternal Grandmother     COPD Other      Social History     Social History    Marital status:      Spouse name: N/A    Number of children: N/A    Years of education: N/A     Occupational History    Not on file.      Social History Main Topics    Smoking status: Former Smoker     Packs/day: 0.50     Years: 26.00     Quit date: 7/15/2006    Smokeless tobacco: Never Used      Comment: 1/2 pack a day, started in approximately 1980, quit 07/2006    Alcohol use No    Drug use: No    Sexual activity: No     Other Topics Concern    Not on file     Social History Narrative    No narrative on file       MEDICATIONS:  Current Outpatient Prescriptions   Medication Sig Dispense Refill    XARELTO 20 MG TABS tablet take 1 tablet by mouth once daily 30 tablet 11    meclizine (ANTIVERT) 25 MG tablet Take 1 tablet by mouth 2 times daily as needed for Dizziness 14 tablet 0    atorvastatin (LIPITOR) 40 MG tablet Take 1 tablet by mouth daily 30 tablet 11    aspirin 81 MG tablet Take 1 tablet by mouth daily. Medication per j tube 30 tablet 3    FIBER SELECT GUMMIES PO Take by mouth daily       No current facility-administered medications for this visit. ALLERGIES:  Allergies as of 10/10/2017 - Review Complete 10/10/2017   Allergen Reaction Noted    Other Shortness Of Breath 10/08/2013    Ace inhibitors Other (See Comments) 10/08/2013    Beta adrenergic blockers Other (See Comments) 10/08/2013    Adhesive tape Rash 10/08/2013           Gynecologic History:  Menarche: 17 yo  Menopause at time of hysterectomy    No LMP recorded. Patient has had a hysterectomy. Hormone Exposure: No    Family History of Breast, Ovarian , Colon or Uterine Cancer: Yes Mother had breast cancer     Preventative Health Testing:  Date of Last Pap Smear: 2016  Date of Last Mammogram: 2017    ________________________________________________________________________  REVIEW OF SYSTEMS:     A minimum of an eleven point review of systems was completed. Review Of Systems (11 point):  General ROS:  negative  Hematological and Lymphatic ROS:negative   Breast ROS: negative  Cardiovascular ROS: negative  Respiratory ROS: negative   Gastrointestinal ROS: positive for diarrhea  Genito-Urinary ROS: positive for recent uti  Psychological ROS: negative  Neurological ROS: positive for stroke with subsequent aphasia  Musculoskeletal ROS: negative  Dermatological ROS: negative                                                                                                                                                                                   PHYSICAL Exam:     Constitutional:  Blood pressure 118/62, pulse 64, height 5' 2\" (1.575 m), weight 120 lb 3.2 oz (54.5 kg), not currently breastfeeding. General Appearance:   This  is a well Developed, well Nourished, Hyperlipidemia    Anxiety    History of tobacco abuse    Fibrocystic breast disease    Mild mitral regurgitation    Sick sinus syndrome (HCC)    Automatic implantable cardioverter-defibrillator in situ    Cerebral infarction (HCC)    Superior mesenteric vein thrombosis    Functional urinary incontinence    Ileus (HCC)          Hereditary Breast, Ovarian, Colon and Uterine Cancer screening Done. Tobacco & Secondary smoke risks reviewed; instructed on cessation and avoidance    PLAN:  Return in about 1 year (around 10/10/2018) for Annual Exam.  Repeat pap per ASCCP 2013 guidelines  Return for annual exams  Mammograms every 1 year. If 37 yo and last mammogram was negative. Routine health maintenance per patients PCP. Orders Placed This Encounter   Procedures    GENITAL CULTURE,  LIMITED     Standing Status:   Future     Number of Occurrences:   1     Standing Expiration Date:   11/10/2017    LORRAINE DIGITAL SCREEN W CAD BILATERAL     Standing Status:   Future     Standing Expiration Date:   3/10/2020     Order Specific Question:   Reason for exam:     Answer:   screening    PAP Smear     Patient History:    No LMP recorded. Patient has had a hysterectomy. OBGYN Status: Hysterectomy  Past Surgical History:  01/10/2008: CARDIAC CATHETERIZATION      Comment: significant cardiomyopathy, ejection fraction                30%, global hypokinesis, sinus bradycardia into               the 40s transiently, frequent PVCs, otherwise                normal.   04/11/2008: CARDIAC DEFIBRILLATOR PLACEMENT      Comment: ICD in situ.   8-: GASTROSTOMY TUBE PLACEMENT      Comment: attempted peg tube, unsuccessful  09/22/1993: HYSTERECTOMY, VAGINAL      Comment: total with BSO for benign tumors.    08/30/2013: ILEOSTOMY OR JEJUNOSTOMY      Comment: jejunostomy  05/14/1982: LAPAROSCOPY      Comment: with D&C.   2015: STOMACH SURGERY      Comment: gastropexy with G-tube holding in place      Smoking status: Former Smoker                                                              Packs/day: 0.50      Years: 26.00        Quit date: 7/15/2006  Smokeless tobacco: Never Used                      Comment: 1/2 pack a day, started in approximately 1980,           quit 07/2006       Order Specific Question:   Collection Type     Answer:   Conventional     Order Specific Question:   Prior Abnormal Pap Test     Answer:   No     Order Specific Question:   Screening or Diagnostic     Answer:   Screening     Order Specific Question:   HPV Requested?      Answer:   N/A     Order Specific Question:   High Risk Patient     Answer:   N/A    MS CA SCREEN;PELVIC/BREAST EXAM    MS OBTAINING SCREEN PAP SMEAR       Margarette Ferrari  10/10/2017      (Please note that portions of this note were completed with a voice-recognition program. Efforts were made to edit the dictations but occasionally words are mis-transcribed.)

## 2017-10-11 ENCOUNTER — TELEPHONE (OUTPATIENT)
Dept: INTERNAL MEDICINE | Age: 67
End: 2017-10-11

## 2017-10-11 DIAGNOSIS — R92.8 FOLLOW-UP EXAMINATION OF ABNORMAL MAMMOGRAM: Primary | ICD-10-CM

## 2017-10-11 ASSESSMENT — ENCOUNTER SYMPTOMS
VOMITING: 0
BLOOD IN STOOL: 0
SORE THROAT: 0
COUGH: 0
SHORTNESS OF BREATH: 0
WHEEZING: 0
BACK PAIN: 0
BLURRED VISION: 0
SPUTUM PRODUCTION: 0
EYE REDNESS: 0
ABDOMINAL PAIN: 0
NAUSEA: 0
HEARTBURN: 0
EYE DISCHARGE: 0
DIARRHEA: 0
DOUBLE VISION: 0
CONSTIPATION: 0

## 2017-10-11 NOTE — PROGRESS NOTES
(LIPITOR) 40 MG tablet Take 1 tablet by mouth daily 30 tablet 11    aspirin 81 MG tablet Take 1 tablet by mouth daily. Medication per j tube 30 tablet 3     No current facility-administered medications on file prior to visit. Social History     Social History    Marital status:      Spouse name: N/A    Number of children: N/A    Years of education: N/A     Occupational History    Not on file. Social History Main Topics    Smoking status: Former Smoker     Packs/day: 0.50     Years: 26.00     Quit date: 7/15/2006    Smokeless tobacco: Never Used      Comment: 1/2 pack a day, started in approximately 1980, quit 07/2006    Alcohol use No    Drug use: No    Sexual activity: No     Other Topics Concern    Not on file     Social History Narrative    No narrative on file       Review of Systems   Constitutional: Negative for chills, diaphoresis and fever. HENT: Positive for hearing loss (decreased hearing ). Negative for congestion, ear pain, nosebleeds and sore throat. Dysphagia      Eyes: Negative for blurred vision, double vision, discharge and redness. Respiratory: Negative for cough, sputum production, shortness of breath and wheezing. Cardiovascular: Negative for palpitations and leg swelling. Gastrointestinal: Negative for abdominal pain, blood in stool, constipation, diarrhea, heartburn, nausea and vomiting. Genitourinary: Positive for frequency. Negative for dysuria, flank pain and urgency. Musculoskeletal: Negative for back pain, falls and myalgias. Generalized weakness      Skin: Negative for rash. Neurological: Negative for dizziness, tremors, sensory change, speech change (nonverbal ), focal weakness, seizures, weakness and headaches. Psychiatric/Behavioral: Negative for depression, hallucinations and suicidal ideas. The patient is not nervous/anxious and does not have insomnia.         Physical Exam   Constitutional: She is well-developed, well-nourished, and in no distress. No distress. HENT:   Head: Normocephalic and atraumatic. Right Ear: External ear normal.   Left Ear: External ear normal.   Nose: Nose normal.   Mouth/Throat: No oropharyngeal exudate. Bilateral auditory canals with large amount of dark cerumen. Ear irrigation completed. Using a curette large amount of cerumen removed. Post-irrigation tympanic membranes intact. No perforation. No erythema. No drainage. Eyes: Conjunctivae are normal. Right eye exhibits no discharge. Left eye exhibits no discharge. Neck: Neck supple. No tracheal deviation present. Cardiovascular: Normal rate, regular rhythm and normal heart sounds. Exam reveals no gallop and no friction rub. No murmur heard. Pulmonary/Chest: Effort normal and breath sounds normal. No respiratory distress. She has no wheezes. She has no rales. She exhibits no tenderness. Abdominal: Soft. Bowel sounds are normal. She exhibits no distension and no mass. There is no tenderness. Musculoskeletal: She exhibits no edema. Neurological: She is alert. No cranial nerve deficit. Coordination (unsteady gait- walks with rolling walker ) abnormal.   Nonverbal      Skin: Skin is warm and dry. No rash noted. She is not diaphoretic. Psychiatric: Affect and judgment normal.   Nursing note reviewed. Vitals:    10/10/17 1134   BP: 118/70   Site: Left Arm   Position: Sitting   Cuff Size: Small Adult   Pulse: 80   Resp: 18   Weight: 120 lb 3.2 oz (54.5 kg)   Height: 5' 2\" (1.575 m)       Assessment:  1. Elevated fasting glucose  Stable  Needs repeat labs as previously scheduled     2. Nonischemic cardiomyopathy ([address]  Stable- follows with cardiology     3. Automatic implantable cardioverter-defibrillator in situ      4. Mixed hyperlipidemia  Stable on statin    5. Cerebral infarction, unspecified mechanism (HCC)   statin , Xarelto, BP control- stable  - External Referral To Home Health    6.  Nonverbal  - External Referral

## 2017-10-12 LAB
CULTURE: NORMAL
Lab: NORMAL
SPECIMEN DESCRIPTION: NORMAL
SPECIMEN DESCRIPTION: NORMAL
STATUS: NORMAL

## 2017-10-12 NOTE — PROGRESS NOTES
Sister is guardian. Please notify of normal vaginal culture. Suspect discomfort with exam, in part due to UTI and also atrophy.

## 2017-10-16 ENCOUNTER — HOSPITAL ENCOUNTER (OUTPATIENT)
Dept: ULTRASOUND IMAGING | Age: 67
Discharge: HOME | End: 2017-10-16
Payer: MEDICARE

## 2017-10-16 ENCOUNTER — HOSPITAL ENCOUNTER (OUTPATIENT)
Dept: MAMMOGRAPHY | Age: 67
Discharge: HOME | End: 2017-10-16
Payer: MEDICARE

## 2017-10-16 DIAGNOSIS — R92.8 FOLLOW-UP EXAMINATION OF ABNORMAL MAMMOGRAM: ICD-10-CM

## 2017-10-16 PROCEDURE — 76642 ULTRASOUND BREAST LIMITED: CPT

## 2017-10-16 PROCEDURE — G0206 DX MAMMO INCL CAD UNI: HCPCS

## 2017-10-24 ENCOUNTER — TELEPHONE (OUTPATIENT)
Dept: INTERNAL MEDICINE | Age: 67
End: 2017-10-24

## 2017-10-24 NOTE — TELEPHONE ENCOUNTER
Last appt: 10/11/2017  Next appt: 4/11/2018    Saundra from [facility] in [place] called in saying that they are going to D/C nursing from the patient. Annamarie Kessler said patient is doing really well with everything. They are still going to do PT/ST but for the nursing side they were just wondering if it was ok to D/C. Call Annamarie Kessler back with any question [phone].

## 2017-10-31 ENCOUNTER — TELEPHONE (OUTPATIENT)
Dept: INTERNAL MEDICINE | Age: 67
End: 2017-10-31
Payer: MEDICARE

## 2017-10-31 DIAGNOSIS — I49.5 SICK SINUS SYNDROME (HCC): ICD-10-CM

## 2017-10-31 DIAGNOSIS — I42.8 OTHER CARDIOMYOPATHIES (CODE): ICD-10-CM

## 2017-10-31 DIAGNOSIS — I69.320 APHASIA FOLLOWING CEREBRAL INFARCTION: ICD-10-CM

## 2017-10-31 DIAGNOSIS — I69.351 HEMIPARESIS AFFECTING RIGHT SIDE AS LATE EFFECT OF CEREBROVASCULAR ACCIDENT (HCC): Primary | ICD-10-CM

## 2017-10-31 PROCEDURE — G0180 MD CERTIFICATION HHA PATIENT: HCPCS | Performed by: INTERNAL MEDICINE

## 2017-12-19 ENCOUNTER — HOSPITAL ENCOUNTER (EMERGENCY)
Age: 67
Discharge: HOME | End: 2017-12-19
Attending: EMERGENCY MEDICINE
Payer: MEDICARE

## 2017-12-19 ENCOUNTER — APPOINTMENT (OUTPATIENT)
Dept: CT IMAGING | Age: 67
End: 2017-12-19
Payer: MEDICARE

## 2017-12-19 VITALS
BODY MASS INDEX: 21.95 KG/M2 | SYSTOLIC BLOOD PRESSURE: 124 MMHG | HEART RATE: 74 BPM | TEMPERATURE: 98.4 F | OXYGEN SATURATION: 98 % | WEIGHT: 120 LBS | RESPIRATION RATE: 16 BRPM | DIASTOLIC BLOOD PRESSURE: 57 MMHG

## 2017-12-19 DIAGNOSIS — K56.41 FECAL IMPACTION (HCC): ICD-10-CM

## 2017-12-19 DIAGNOSIS — R10.9 ABDOMINAL PAIN, UNSPECIFIED ABDOMINAL LOCATION: Primary | ICD-10-CM

## 2017-12-19 LAB
-: ABNORMAL
ABSOLUTE EOS #: 0.12 K/UL (ref 0–0.4)
ABSOLUTE IMMATURE GRANULOCYTE: ABNORMAL K/UL (ref 0–0.3)
ABSOLUTE LYMPH #: 1.11 K/UL (ref 1–4.8)
ABSOLUTE MONO #: 0.23 K/UL (ref 0.1–1.2)
ALBUMIN SERPL-MCNC: 4.4 G/DL (ref 3.5–5.2)
ALBUMIN/GLOBULIN RATIO: 1.3 (ref 1–2.5)
ALP BLD-CCNC: 63 U/L (ref 35–104)
ALT SERPL-CCNC: 22 U/L (ref 5–33)
AMORPHOUS: ABNORMAL
AMYLASE: 107 U/L (ref 28–100)
ANION GAP SERPL CALCULATED.3IONS-SCNC: 13 MMOL/L (ref 9–17)
AST SERPL-CCNC: 29 U/L
BACTERIA: ABNORMAL
BASOPHILS # BLD: 1 % (ref 0–1)
BASOPHILS ABSOLUTE: 0.05 K/UL (ref 0–0.2)
BILIRUB SERPL-MCNC: 0.71 MG/DL (ref 0.3–1.2)
BILIRUBIN DIRECT: 0.17 MG/DL
BILIRUBIN URINE: NEGATIVE
BILIRUBIN, INDIRECT: 0.54 MG/DL (ref 0–1)
BUN BLDV-MCNC: 22 MG/DL (ref 8–23)
BUN/CREAT BLD: 24 (ref 9–20)
CALCIUM SERPL-MCNC: 10 MG/DL (ref 8.6–10.4)
CASTS UA: ABNORMAL /LPF (ref 0–2)
CHLORIDE BLD-SCNC: 106 MMOL/L (ref 98–107)
CO2: 26 MMOL/L (ref 20–31)
COLOR: ABNORMAL
COMMENT UA: ABNORMAL
CREAT SERPL-MCNC: 0.9 MG/DL (ref 0.5–0.9)
CRYSTALS, UA: ABNORMAL /HPF
DIFFERENTIAL TYPE: ABNORMAL
EOSINOPHILS RELATIVE PERCENT: 1 % (ref 1–7)
EPITHELIAL CELLS UA: ABNORMAL /HPF (ref 0–5)
GFR AFRICAN AMERICAN: >60 ML/MIN
GFR NON-AFRICAN AMERICAN: >60 ML/MIN
GFR SERPL CREATININE-BSD FRML MDRD: ABNORMAL ML/MIN/{1.73_M2}
GFR SERPL CREATININE-BSD FRML MDRD: ABNORMAL ML/MIN/{1.73_M2}
GLOBULIN: 3.5 G/DL (ref 1.5–3.8)
GLUCOSE BLD-MCNC: 126 MG/DL (ref 70–99)
GLUCOSE URINE: NEGATIVE
HCT VFR BLD CALC: 47.1 % (ref 36–46)
HEMOGLOBIN: 15.5 G/DL (ref 12–16)
IMMATURE GRANULOCYTES: ABNORMAL %
KETONES, URINE: NEGATIVE
LEUKOCYTE ESTERASE, URINE: NEGATIVE
LIPASE: 56 U/L (ref 13–60)
LYMPHOCYTES # BLD: 10 % (ref 16–46)
MCH RBC QN AUTO: 31.4 PG (ref 26–34)
MCHC RBC AUTO-ENTMCNC: 33 G/DL (ref 31–37)
MCV RBC AUTO: 95 FL (ref 80–100)
MONOCYTES # BLD: 2 % (ref 4–11)
MUCUS: ABNORMAL
NITRITE, URINE: NEGATIVE
OTHER OBSERVATIONS UA: ABNORMAL
PDW BLD-RTO: 13.2 % (ref 11–14.5)
PH UA: 6.5 (ref 5–6)
PLATELET # BLD: 171 K/UL (ref 140–450)
PLATELET ESTIMATE: ABNORMAL
PMV BLD AUTO: 9.1 FL (ref 6–12)
POTASSIUM SERPL-SCNC: 5.7 MMOL/L (ref 3.7–5.3)
PROTEIN UA: NEGATIVE
RBC # BLD: 4.96 M/UL (ref 4–5.2)
RBC # BLD: ABNORMAL 10*6/UL
RBC UA: ABNORMAL /HPF (ref 0–4)
RENAL EPITHELIAL, UA: ABNORMAL /HPF
SEG NEUTROPHILS: 86 % (ref 43–77)
SEGMENTED NEUTROPHILS ABSOLUTE COUNT: 9.39 K/UL (ref 1.8–7.7)
SODIUM BLD-SCNC: 145 MMOL/L (ref 135–144)
SPECIFIC GRAVITY UA: 1.01 (ref 1.01–1.02)
TOTAL PROTEIN: 7.9 G/DL (ref 6.4–8.3)
TRICHOMONAS: ABNORMAL
TURBIDITY: ABNORMAL
URINE HGB: ABNORMAL
UROBILINOGEN, URINE: NORMAL
WBC # BLD: 10.9 K/UL (ref 3.5–11)
WBC # BLD: ABNORMAL 10*3/UL
WBC UA: ABNORMAL /HPF (ref 0–4)
YEAST: ABNORMAL

## 2017-12-19 PROCEDURE — 83690 ASSAY OF LIPASE: CPT

## 2017-12-19 PROCEDURE — 6370000000 HC RX 637 (ALT 250 FOR IP): Performed by: EMERGENCY MEDICINE

## 2017-12-19 PROCEDURE — 82150 ASSAY OF AMYLASE: CPT

## 2017-12-19 PROCEDURE — 36415 COLL VENOUS BLD VENIPUNCTURE: CPT

## 2017-12-19 PROCEDURE — 2580000003 HC RX 258: Performed by: EMERGENCY MEDICINE

## 2017-12-19 PROCEDURE — 81001 URINALYSIS AUTO W/SCOPE: CPT

## 2017-12-19 PROCEDURE — 85025 COMPLETE CBC W/AUTO DIFF WBC: CPT

## 2017-12-19 PROCEDURE — 87086 URINE CULTURE/COLONY COUNT: CPT

## 2017-12-19 PROCEDURE — 80076 HEPATIC FUNCTION PANEL: CPT

## 2017-12-19 PROCEDURE — 99285 EMERGENCY DEPT VISIT HI MDM: CPT

## 2017-12-19 PROCEDURE — 80048 BASIC METABOLIC PNL TOTAL CA: CPT

## 2017-12-19 PROCEDURE — 6360000004 HC RX CONTRAST MEDICATION: Performed by: EMERGENCY MEDICINE

## 2017-12-19 PROCEDURE — 74177 CT ABD & PELVIS W/CONTRAST: CPT

## 2017-12-19 RX ORDER — ONDANSETRON 2 MG/ML
4 INJECTION INTRAMUSCULAR; INTRAVENOUS ONCE
Status: DISCONTINUED | OUTPATIENT
Start: 2017-12-19 | End: 2017-12-19 | Stop reason: HOSPADM

## 2017-12-19 RX ORDER — 0.9 % SODIUM CHLORIDE 0.9 %
1000 INTRAVENOUS SOLUTION INTRAVENOUS ONCE
Status: COMPLETED | OUTPATIENT
Start: 2017-12-19 | End: 2017-12-19

## 2017-12-19 RX ADMIN — SODIUM CHLORIDE 1000 ML: 9 INJECTION, SOLUTION INTRAVENOUS at 08:33

## 2017-12-19 RX ADMIN — MAGESIUM CITRATE 296 ML: 1.75 LIQUID ORAL at 11:00

## 2017-12-19 RX ADMIN — IOPAMIDOL 100 ML: 755 INJECTION, SOLUTION INTRAVENOUS at 09:11

## 2017-12-19 ASSESSMENT — PAIN DESCRIPTION - LOCATION: LOCATION: ABDOMEN

## 2017-12-19 NOTE — ED PROVIDER NOTES
[facility] ED  [facility]  Phone: [phone]    Pt Name: Mar Soliz  MRN: [id number]  [place] 1950  Date of evaluation: 12/19/2017      CHIEF COMPLAINT       Chief Complaint   Patient presents with    Abdominal Pain    Diarrhea         HISTORY OF PRESENT ILLNESS     Mar Soliz is a 74 y.o. female who presents With abdominal pain and diarrhea for the past 2-3 days. Patient is a phasic due to her previous stroke. She lives in assisted living. She is brought in by her relative. She was feeling weak today and had trouble getting out of bed. No focal neurologic deficits that are new at this time. Her vital signs are normal.  There is some diffuse abdominal pain to palpation. It is no GI bleed. She is fed through a G-tube. She has a previous hysterectomy and she has an ileostomy in the past.  No new medications. She has multiple chronic conditions and has a cardiac defibrillator in place. REVIEW OF SYSTEMS         REVIEW OF SYSTEMS    Constitutional:  Denies fever, chills, or weakness   Eyes:  Denies vision changes  HEENT:  Denies sore throat or neck pain   Respiratory:  Denies cough or shortness of breath   Cardiovascular:  Denies chest pain  GI:  As above  Musculoskeletal:  Denies back pain   Skin:  No rash on exposed surfaces   Neurologic:  Normal baseline mentation. No new deficits. Lymphatic:   No nodes or infection  Psychiatric:  No psychosis. Alert and interacting normally. Other ROS negative except as noted above. PAST MEDICAL HISTORY    has a past medical history of Anxiety; Blurred vision, bilateral; Bradycardia; Chronic renal insufficiency; Constipation; Depression; Elevated fasting glucose; Encephalomalacia; Fibrocystic breast disease; Frontal headache; Genital atrophy of female; Grief reaction; Hiatal hernia; History of tobacco abuse; Hyperlipidemia; Mild mitral regurgitation; Nonischemic cardiomyopathy ([facility] [address]  Overweight; Paraesophageal hiatal hernia; Sick sinus syndrome ([facility] [address] Stroke [facility]); Superior mesenteric vein thrombosis; and Tinnitus of right ear. SURGICAL HISTORY      has a past surgical history that includes laparoscopy (1982); Hysterectomy, vaginal (1993); Cardiac defibrillator placement (2008); Cardiac catheterization (01/10/2008); Gastrostomy tube placement (2014); ileostomy or jejunostomy (2013); and Stomach surgery (). CURRENT MEDICATIONS       Discharge Medication List as of 2017 10:34 AM      CONTINUE these medications which have NOT CHANGED    Details   FIBER SELECT GUMMIES PO Take by mouth dailyHistorical Med      XARELTO 20 MG TABS tablet take 1 tablet by mouth once daily, Disp-30 tablet, R-11Normal      meclizine (ANTIVERT) 25 MG tablet Take 1 tablet by mouth 2 times daily as needed for Dizziness, Disp-14 tablet, R-0Print      atorvastatin (LIPITOR) 40 MG tablet Take 1 tablet by mouth daily, Disp-30 tablet, R-11      aspirin 81 MG tablet Take 1 tablet by mouth daily. Medication per j tube, Disp-30 tablet, R-3             ALLERGIES     is allergic to other; ace inhibitors; beta adrenergic blockers; and adhesive tape. FAMILY HISTORY     indicated that her mother is . She indicated that her father is . She indicated that the status of her maternal grandmother is unknown. She indicated that the status of her maternal grandfather is unknown. She indicated that the status of her paternal grandmother is unknown.      family history includes Breast Cancer (age of onset: 62) in her mother; COPD in an other family member; Cancer in her father and mother; Diabetes in her father, maternal grandfather, maternal grandmother, and mother; High Blood Pressure in her mother; Mental Illness in her mother; Stomach Cancer in her paternal grandmother; Stroke in her maternal grandfather and maternal grandmother.     SOCIAL HISTORY      reports that she quit smoking about 11 years ago. She has a 13.00 pack-year smoking history. She has never used smokeless tobacco. She reports that she does not drink alcohol or use drugs. PHYSICAL EXAM     INITIAL VITALS:  weight is 120 lb (54.4 kg). Her tympanic temperature is 98.4 °F (36.9 °C). Her blood pressure is 124/57 (abnormal) and her pulse is 74. Her respiration is 16 and oxygen saturation is 98%. Constitutional: The patient is alert and well-developed, vital signs as noted. Psychiatric: Oriented to time, place and person, affect is appropriate for age. Eyes: Pupils equal and reactive to light. EOMI. Ears, nose, and throat: Oropharynx clear  Neck: No masses, trachea midline, and no thyromegaly. Respiratory: Clear to auscultation, full aeration throughout all fields. Cardiovascular: No murmurs, heart sounds normal, no thrills. Gastrointestinal: No masses, no hepatosplenomegaly, bowel sounds positive. No tenderness. Skin: No rashes or lesions on exposed surfaces, good skin turgor. Extremities: Good range of motion, no edema. Rectal: External exam demonstrates a fecal impaction which removed and symptoms improved. DIFFERENTIAL DIAGNOSIS/ MEDICAL DECISION MAKING:     Benign abdomen at discharge    Mag citrate to be used as directed    Follow Exit Care instructions closely. I have reviewed the disposition diagnosis with the patient and or their family/guardian. I have answered their questions and given discharge instructions. They voiced understanding of these instructions and did not have any further questions or complaints. DIAGNOSTIC RESULTS     RADIOLOGY:   Non-plain film images such as CT, Ultrasound and MRI are read by the radiologist. Plain radiographic images are visualized and preliminarily interpreted by the emergency physician with the below findings:  CT ABDOMEN PELVIS W IV CONTRAST   Final Result   1.  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 2. Cholelithiasis and 5.4 cm right renal cyst is stable. 3. No acute infective or inflammatory process.              LABS:  Results for orders placed or performed during the hospital encounter of 12/19/17   Amylase   Result Value Ref Range    Amylase 107 (H) 28 - 100 U/L   Basic Metabolic Panel   Result Value Ref Range    Glucose 126 (H) 70 - 99 mg/dL    BUN 22 8 - 23 mg/dL    CREATININE 0.90 0.50 - 0.90 mg/dL    Bun/Cre Ratio 24 (H) 9 - 20    Calcium 10.0 8.6 - 10.4 mg/dL    Sodium 145 (H) 135 - 144 mmol/L    Potassium 5.7 (H) 3.7 - 5.3 mmol/L    Chloride 106 98 - 107 mmol/L    CO2 26 20 - 31 mmol/L    Anion Gap 13 9 - 17 mmol/L    GFR Non-African American >60 >60 mL/min    GFR African American >60 >60 mL/min    GFR Comment          GFR Staging NOT REPORTED    CBC Auto Differential   Result Value Ref Range    WBC 10.9 3.5 - 11.0 k/uL    RBC 4.96 4.0 - 5.2 m/uL    Hemoglobin 15.5 12.0 - 16.0 g/dL    Hematocrit 47.1 (H) 36 - 46 %    MCV 95.0 80 - 100 fL    MCH 31.4 26 - 34 pg    MCHC 33.0 31 - 37 g/dL    RDW 13.2 11.0 - 14.5 %    Platelets 043 727 - 473 k/uL    MPV 9.1 6.0 - 12.0 fL    Differential Type NOT REPORTED     Immature Granulocytes NOT REPORTED 0 %    Absolute Immature Granulocyte NOT REPORTED 0.00 - 0.30 k/uL    WBC Morphology NOT REPORTED     RBC Morphology NOT REPORTED     Platelet Estimate NOT REPORTED     Seg Neutrophils 86 (H) 43 - 77 %    Lymphocytes 10 (L) 16 - 46 %    Monocytes 2 (L) 4 - 11 %    Eosinophils % 1 1 - 7 %    Basophils 1 0 - 1 %    Segs Absolute 9.39 (H) 1.8 - 7.7 k/uL    Absolute Lymph # 1.11 1.0 - 4.8 k/uL    Absolute Mono # 0.23 0.1 - 1.2 k/uL    Absolute Eos # 0.12 0.0 - 0.4 k/uL    Basophils # 0.05 0.0 - 0.2 k/uL   Hepatic Function Panel   Result Value Ref Range    Alb 4.4 3.5 - 5.2 g/dL    Alkaline Phosphatase 63 35 - 104 U/L    ALT 22 5 - 33 U/L    AST 29 <32 U/L    Total Bilirubin 0.71 0.3 - 1.2 mg/dL    Bilirubin, Direct 0.17 <0.31 mg/dL

## 2017-12-20 ENCOUNTER — CARE COORDINATION (OUTPATIENT)
Dept: CARE COORDINATION | Age: 67
End: 2017-12-20

## 2018-01-01 ENCOUNTER — OFFICE VISIT (OUTPATIENT)
Dept: INTERNAL MEDICINE | Age: 68
End: 2018-01-01
Payer: MEDICARE

## 2018-01-01 ENCOUNTER — OFFICE VISIT (OUTPATIENT)
Dept: CARDIOLOGY | Age: 68
End: 2018-01-01
Payer: MEDICARE

## 2018-01-01 ENCOUNTER — PROCEDURE VISIT (OUTPATIENT)
Dept: CARDIOLOGY | Age: 68
End: 2018-01-01
Payer: MEDICARE

## 2018-01-01 VITALS
DIASTOLIC BLOOD PRESSURE: 78 MMHG | BODY MASS INDEX: 20.69 KG/M2 | WEIGHT: 121.2 LBS | HEART RATE: 60 BPM | SYSTOLIC BLOOD PRESSURE: 120 MMHG | HEIGHT: 64 IN

## 2018-01-01 VITALS
HEIGHT: 64 IN | DIASTOLIC BLOOD PRESSURE: 80 MMHG | HEART RATE: 74 BPM | SYSTOLIC BLOOD PRESSURE: 116 MMHG | WEIGHT: 114 LBS | BODY MASS INDEX: 19.46 KG/M2

## 2018-01-01 VITALS
RESPIRATION RATE: 16 BRPM | DIASTOLIC BLOOD PRESSURE: 60 MMHG | BODY MASS INDEX: 19.19 KG/M2 | SYSTOLIC BLOOD PRESSURE: 112 MMHG | HEART RATE: 68 BPM | HEIGHT: 65 IN | WEIGHT: 115.2 LBS

## 2018-01-01 DIAGNOSIS — F41.9 ANXIETY: ICD-10-CM

## 2018-01-01 DIAGNOSIS — R73.01 ELEVATED FASTING GLUCOSE: Primary | ICD-10-CM

## 2018-01-01 DIAGNOSIS — R47.01 EXPRESSIVE APHASIA: ICD-10-CM

## 2018-01-01 DIAGNOSIS — Z95.0 CARDIAC PACEMAKER IN SITU: Primary | ICD-10-CM

## 2018-01-01 DIAGNOSIS — I49.5 SICK SINUS SYNDROME (HCC): ICD-10-CM

## 2018-01-01 DIAGNOSIS — E78.2 MIXED HYPERLIPIDEMIA: ICD-10-CM

## 2018-01-01 DIAGNOSIS — Z23 INFLUENZA VACCINE NEEDED: ICD-10-CM

## 2018-01-01 DIAGNOSIS — Z95.810 AUTOMATIC IMPLANTABLE CARDIOVERTER-DEFIBRILLATOR IN SITU: ICD-10-CM

## 2018-01-01 DIAGNOSIS — I42.8 NONISCHEMIC CARDIOMYOPATHY (HCC): ICD-10-CM

## 2018-01-01 DIAGNOSIS — I63.9 CEREBRAL INFARCTION, UNSPECIFIED MECHANISM (HCC): ICD-10-CM

## 2018-01-01 DIAGNOSIS — I42.0 DILATED CARDIOMYOPATHY (HCC): Primary | ICD-10-CM

## 2018-01-01 DIAGNOSIS — Z95.810 PRESENCE OF AUTOMATIC CARDIOVERTER/DEFIBRILLATOR (AICD): ICD-10-CM

## 2018-01-01 PROCEDURE — G8427 DOCREV CUR MEDS BY ELIG CLIN: HCPCS | Performed by: INTERNAL MEDICINE

## 2018-01-01 PROCEDURE — 1101F PT FALLS ASSESS-DOCD LE1/YR: CPT | Performed by: INTERNAL MEDICINE

## 2018-01-01 PROCEDURE — G8482 FLU IMMUNIZE ORDER/ADMIN: HCPCS | Performed by: INTERNAL MEDICINE

## 2018-01-01 PROCEDURE — 1090F PRES/ABSN URINE INCON ASSESS: CPT | Performed by: INTERNAL MEDICINE

## 2018-01-01 PROCEDURE — 90662 IIV NO PRSV INCREASED AG IM: CPT | Performed by: INTERNAL MEDICINE

## 2018-01-01 PROCEDURE — 1123F ACP DISCUSS/DSCN MKR DOCD: CPT | Performed by: INTERNAL MEDICINE

## 2018-01-01 PROCEDURE — G8598 ASA/ANTIPLAT THER USED: HCPCS | Performed by: INTERNAL MEDICINE

## 2018-01-01 PROCEDURE — 3014F SCREEN MAMMO DOC REV: CPT | Performed by: INTERNAL MEDICINE

## 2018-01-01 PROCEDURE — 1036F TOBACCO NON-USER: CPT | Performed by: INTERNAL MEDICINE

## 2018-01-01 PROCEDURE — 3017F COLORECTAL CA SCREEN DOC REV: CPT | Performed by: INTERNAL MEDICINE

## 2018-01-01 PROCEDURE — G8400 PT W/DXA NO RESULTS DOC: HCPCS | Performed by: INTERNAL MEDICINE

## 2018-01-01 PROCEDURE — G8420 CALC BMI NORM PARAMETERS: HCPCS | Performed by: INTERNAL MEDICINE

## 2018-01-01 PROCEDURE — 99213 OFFICE O/P EST LOW 20 MIN: CPT | Performed by: INTERNAL MEDICINE

## 2018-01-01 PROCEDURE — 4040F PNEUMOC VAC/ADMIN/RCVD: CPT | Performed by: INTERNAL MEDICINE

## 2018-01-01 PROCEDURE — 99214 OFFICE O/P EST MOD 30 MIN: CPT | Performed by: INTERNAL MEDICINE

## 2018-01-01 PROCEDURE — G0008 ADMIN INFLUENZA VIRUS VAC: HCPCS | Performed by: INTERNAL MEDICINE

## 2018-01-01 PROCEDURE — 93289 INTERROG DEVICE EVAL HEART: CPT | Performed by: INTERNAL MEDICINE

## 2018-01-01 RX ORDER — RIVAROXABAN 20 MG/1
TABLET, FILM COATED ORAL
Qty: 30 TABLET | Refills: 11 | Status: SHIPPED | OUTPATIENT
Start: 2018-01-01

## 2018-01-01 ASSESSMENT — ENCOUNTER SYMPTOMS
ABDOMINAL PAIN: 0
VOMITING: 0
NAUSEA: 0

## 2018-01-01 ASSESSMENT — PATIENT HEALTH QUESTIONNAIRE - PHQ9
1. LITTLE INTEREST OR PLEASURE IN DOING THINGS: 0
SUM OF ALL RESPONSES TO PHQ QUESTIONS 1-9: 0
2. FEELING DOWN, DEPRESSED OR HOPELESS: 0
SUM OF ALL RESPONSES TO PHQ9 QUESTIONS 1 & 2: 0

## 2018-01-12 ENCOUNTER — TELEPHONE (OUTPATIENT)
Dept: INTERNAL MEDICINE | Age: 68
End: 2018-01-12

## 2018-01-12 NOTE — TELEPHONE ENCOUNTER
Call [facility] and ask what the speech therapy is for, so I can put the proper diagnosis code on the order.

## 2018-02-07 RX ORDER — ATORVASTATIN CALCIUM 40 MG/1
TABLET, FILM COATED ORAL
Qty: 30 TABLET | Refills: 11 | Status: SHIPPED | OUTPATIENT
Start: 2018-02-07 | End: 2019-01-01 | Stop reason: SDUPTHER

## 2018-02-19 ENCOUNTER — TELEPHONE (OUTPATIENT)
Dept: INTERNAL MEDICINE | Age: 68
End: 2018-02-19

## 2018-02-19 ENCOUNTER — OFFICE VISIT (OUTPATIENT)
Dept: INTERNAL MEDICINE | Age: 68
End: 2018-02-19
Payer: MEDICARE

## 2018-02-19 VITALS
BODY MASS INDEX: 20.7 KG/M2 | WEIGHT: 116.8 LBS | DIASTOLIC BLOOD PRESSURE: 60 MMHG | HEART RATE: 76 BPM | SYSTOLIC BLOOD PRESSURE: 102 MMHG | HEIGHT: 63 IN | RESPIRATION RATE: 16 BRPM

## 2018-02-19 DIAGNOSIS — I42.8 OTHER CARDIOMYOPATHIES (CODE): ICD-10-CM

## 2018-02-19 DIAGNOSIS — R47.01 EXPRESSIVE APHASIA: Primary | ICD-10-CM

## 2018-02-19 DIAGNOSIS — I69.320 APHASIA FOLLOWING CEREBRAL INFARCTION: Primary | ICD-10-CM

## 2018-02-19 DIAGNOSIS — I69.351 HEMIPLEGIA AND HEMIPARESIS FOLLOWING CEREBRAL INFARCTION AFFECTING RIGHT DOMINANT SIDE (HCC): ICD-10-CM

## 2018-02-19 DIAGNOSIS — I63.9 CEREBRAL INFARCTION, UNSPECIFIED MECHANISM (HCC): ICD-10-CM

## 2018-02-19 PROCEDURE — G8400 PT W/DXA NO RESULTS DOC: HCPCS | Performed by: INTERNAL MEDICINE

## 2018-02-19 PROCEDURE — G0180 MD CERTIFICATION HHA PATIENT: HCPCS | Performed by: INTERNAL MEDICINE

## 2018-02-19 PROCEDURE — G8427 DOCREV CUR MEDS BY ELIG CLIN: HCPCS | Performed by: INTERNAL MEDICINE

## 2018-02-19 PROCEDURE — G8484 FLU IMMUNIZE NO ADMIN: HCPCS | Performed by: INTERNAL MEDICINE

## 2018-02-19 PROCEDURE — 3014F SCREEN MAMMO DOC REV: CPT | Performed by: INTERNAL MEDICINE

## 2018-02-19 PROCEDURE — 4040F PNEUMOC VAC/ADMIN/RCVD: CPT | Performed by: INTERNAL MEDICINE

## 2018-02-19 PROCEDURE — G8420 CALC BMI NORM PARAMETERS: HCPCS | Performed by: INTERNAL MEDICINE

## 2018-02-19 PROCEDURE — 3017F COLORECTAL CA SCREEN DOC REV: CPT | Performed by: INTERNAL MEDICINE

## 2018-02-19 PROCEDURE — 1123F ACP DISCUSS/DSCN MKR DOCD: CPT | Performed by: INTERNAL MEDICINE

## 2018-02-19 PROCEDURE — 1036F TOBACCO NON-USER: CPT | Performed by: INTERNAL MEDICINE

## 2018-02-19 PROCEDURE — 1090F PRES/ABSN URINE INCON ASSESS: CPT | Performed by: INTERNAL MEDICINE

## 2018-02-19 PROCEDURE — 99213 OFFICE O/P EST LOW 20 MIN: CPT | Performed by: INTERNAL MEDICINE

## 2018-02-19 PROCEDURE — G8598 ASA/ANTIPLAT THER USED: HCPCS | Performed by: INTERNAL MEDICINE

## 2018-02-19 RX ORDER — DONEPEZIL HYDROCHLORIDE 10 MG/1
10 TABLET, FILM COATED ORAL DAILY
Status: ON HOLD | COMMUNITY
End: 2019-01-01 | Stop reason: HOSPADM

## 2018-02-19 NOTE — PROGRESS NOTES
chart.       PHYSICAL EXAMINATION:    Wt Readings from Last 2 Encounters:   02/19/18 116 lb 12.8 oz (53 kg)   12/19/17 120 lb (54.4 kg)       Vitals: /60 (Site: Right Arm, Position: Sitting, Cuff Size: Medium Adult)   Pulse 76   Resp 16   Ht 5' 2.8\" (1.595 m)   Wt 116 lb 12.8 oz (53 kg)   BMI 20.83 kg/m²   General: This is a 74 y.o.  female who is alert. Unable to assess orientation due to being nonverbal. She appears to be her stated age and does not appear to be in any acute distress. Nonverbal due to aphasia. Skin: Skin color, texture, turgor normal. No rashes or lesions. HEENT/Neck: Head: Normal, normocephalic, atraumatic. Eye: Normal external eye, conjunctiva, lids cornea, ADOLFO. Ears: Normal TM's bilaterally. Normal auditory canals and external ears. Non-tender. Nose: Normal external nose, mucus membranes and septum. Pharynx: Dental Hygiene adequate. Normal buccal mucosa. Normal pharynx. Neck / Thyroid: Supple, no masses, nodes, nodules or enlargement. Lungs: Normal - CTA without rales, rhonchi, or wheezing. Heart: regular rate and rhythm, S1, S2 normal, no murmur, click, rub or gallop No S3 or S4. Abdomen: Non-obese, soft, non-distended, non-tender, normal active bowel sounds, no masses palpated and no hepatosplenomegaly  Extremities: There is no clubbing, cyanosis, edema  Neurologic:  cranial nerves II-XII are grossly intact    ASSESSMENT/PLAN:    1. Expressive aphasia, stable  2. Cerebral infarction, unspecified mechanism ([facility] [address] stable  - She will continue to work with [facility] to get the speech board equipment that she needs. - We will fill out the necessary paperwork. No orders of the defined types were placed in this encounter. Requested Prescriptions      No prescriptions requested or ordered in this encounter       She will return here as previously scheduled or sooner if needed.         Electronically signed by Sylvia Suarez DO on 2/19/2018 at 4:57

## 2018-02-22 ENCOUNTER — PROCEDURE VISIT (OUTPATIENT)
Dept: CARDIOLOGY | Age: 68
End: 2018-02-22
Payer: MEDICARE

## 2018-02-22 ENCOUNTER — OFFICE VISIT (OUTPATIENT)
Dept: CARDIOLOGY | Age: 68
End: 2018-02-22
Payer: MEDICARE

## 2018-02-22 VITALS
HEIGHT: 64 IN | SYSTOLIC BLOOD PRESSURE: 94 MMHG | HEART RATE: 73 BPM | DIASTOLIC BLOOD PRESSURE: 60 MMHG | WEIGHT: 116.2 LBS | BODY MASS INDEX: 19.84 KG/M2

## 2018-02-22 DIAGNOSIS — Z95.810 PRESENCE OF AUTOMATIC CARDIOVERTER/DEFIBRILLATOR (AICD): Primary | ICD-10-CM

## 2018-02-22 DIAGNOSIS — I49.5 SICK SINUS SYNDROME (HCC): ICD-10-CM

## 2018-02-22 DIAGNOSIS — I42.8 NONISCHEMIC CARDIOMYOPATHY (HCC): ICD-10-CM

## 2018-02-22 DIAGNOSIS — E78.2 MIXED HYPERLIPIDEMIA: ICD-10-CM

## 2018-02-22 DIAGNOSIS — I42.8 NONISCHEMIC CARDIOMYOPATHY (HCC): Primary | ICD-10-CM

## 2018-02-22 PROCEDURE — 99213 OFFICE O/P EST LOW 20 MIN: CPT | Performed by: INTERNAL MEDICINE

## 2018-02-22 PROCEDURE — 93000 ELECTROCARDIOGRAM COMPLETE: CPT | Performed by: INTERNAL MEDICINE

## 2018-02-22 PROCEDURE — 93289 INTERROG DEVICE EVAL HEART: CPT | Performed by: INTERNAL MEDICINE

## 2018-02-22 NOTE — PROGRESS NOTES
Today's Date: 2/22/2018  Patient Name: Theodore Duarte  Patient's age: 74 y.o., 1950          The patient is a 74 y.o.  female is in the office for f/u and pacemaker check. Doing well cardiac wise, no new cardiac complaints. Past Medical History:   has a past medical history of Anxiety; Blurred vision, bilateral; Bradycardia; Chronic renal insufficiency; Constipation; Depression; Elevated fasting glucose; Encephalomalacia; Fibrocystic breast disease; Frontal headache; Genital atrophy of female; Grief reaction; Hiatal hernia; History of tobacco abuse; Hyperlipidemia; Mild mitral regurgitation; Nonischemic cardiomyopathy ([facility] [address] Overweight; Paraesophageal hiatal hernia; Sick sinus syndrome ([facility] [address] Stroke [facility]); Superior mesenteric vein thrombosis; and Tinnitus of right ear. Past Surgical History:   has a past surgical history that includes laparoscopy (05/14/1982); Hysterectomy, vaginal (09/22/1993); Cardiac defibrillator placement (04/11/2008); Cardiac catheterization (01/10/2008); Gastrostomy tube placement (8-); ileostomy or jejunostomy (08/30/2013); and Stomach surgery (2015). Home Medications:    Prior to Admission medications    Medication Sig Start Date End Date Taking? Authorizing Provider   donepezil (ARICEPT) 10 MG tablet Take 10 mg by mouth daily    Historical Provider, MD   atorvastatin (LIPITOR) 40 MG tablet take 1 tablet by mouth once daily 2/7/18   Jeferson Hollis DO   FIBER SELECT GUMMIES PO Take by mouth daily    Historical Provider, MD   XARELTO 20 MG TABS tablet take 1 tablet by mouth once daily 10/9/17   Jeferson Hollis DO   meclizine (ANTIVERT) 25 MG tablet Take 1 tablet by mouth 2 times daily as needed for Dizziness 7/2/17   Tiff Hyman MD   aspirin 81 MG tablet Take 1 tablet by mouth daily. Medication per j tube 9/2/14   Margene Balling       Allergies: Other; Ace inhibitors;  Beta adrenergic blockers; and Adhesive

## 2018-04-18 PROBLEM — R47.01 EXPRESSIVE APHASIA: Status: ACTIVE | Noted: 2018-01-01

## 2018-08-23 NOTE — PROGRESS NOTES
blockers; and Adhesive tape    Social History:   reports that she quit smoking about 12 years ago. She has a 13.00 pack-year smoking history. She has never used smokeless tobacco. She reports that she does not drink alcohol or use drugs. Review of Systems:  Review of Systems   Constitutional: Negative for chills and fever. HENT: Negative for congestion. Cardiovascular: Negative for chest pain and palpitations. Gastrointestinal: Negative for abdominal pain, nausea and vomiting. Neurological: Negative for dizziness. Psychiatric/Behavioral: Negative for agitation and behavioral problems. Physical Exam:  /80   Pulse 74   Ht 5' 4\" (1.626 m)   Wt 114 lb (51.7 kg)   BMI 19.57 kg/m²   Physical Exam   Constitutional: She is oriented to person, place, and time. She appears well-developed and well-nourished. HENT:   Head: Normocephalic and atraumatic. Neck: No JVD present. Cardiovascular: Normal rate and regular rhythm. No murmur heard. Pulmonary/Chest: She has no wheezes. She has no rales. Abdominal: There is no tenderness. There is no rebound and no guarding. Musculoskeletal: She exhibits no edema. Neurological: She is alert and oriented to person, place, and time. She has normal reflexes. Psychiatric: She has a normal mood and affect. Her behavior is normal.       Cardiac Data:      Labs:     CBC: No results for input(s): WBC, HGB, HCT, PLT in the last 72 hours. BMP: No results for input(s): NA, K, CO2, BUN, CREATININE, LABGLOM, GLUCOSE in the last 72 hours. PT/INR: No results for input(s): PROTIME, INR in the last 72 hours. FASTING LIPID PANEL:  Lab Results   Component Value Date    HDL 55 09/02/2016    LDLCALC 20 09/02/2016    TRIG 86 09/02/2016     LIVER PROFILE:No results for input(s): AST, ALT, LABALBU in the last 72 hours.     IMPRESSION:    Patient Active Problem List   Diagnosis    Elevated fasting glucose    Nonischemic cardiomyopathy ([facility] [address]    Hyperlipidemia    Anxiety    History of tobacco abuse    Fibrocystic breast disease    Mild mitral regurgitation    Sick sinus syndrome (HCC)    Automatic implantable cardioverter-defibrillator in situ    Cerebral infarction (HCC)    Superior mesenteric vein thrombosis    Functional urinary incontinence    Expressive aphasia       Assessment/Plan:  1. Nonischemic cardiomyopathy s/p AICD placement. Checked 8/23/18 and functioning normal. Doing well with no signs of volume overload. Continue current medications. Has intolerance to BB. 2. HTN. Well controlled on current medications  3. Multiple CVAs.  Has been on [address] Cardiology Consult           [phone]

## 2018-10-18 NOTE — PATIENT INSTRUCTIONS
tablespoon of peanut butter, ½ ounce nuts or seeds, or ¼ cup of cooked beans equals 1 ounce of meat. · Learn how to read food labels for serving sizes and ingredients. Fast-food and convenience-food meals often contain few or no fruits or vegetables. Make sure you eat some fruits and vegetables to make the meal more nutritious. · Look at your food diary. For each food group, add up what you have eaten and then divide the total by the number of days. This will give you an idea of how much you are eating from each food group. See if you can find some ways to change your diet to make it more healthy. Start small  · Do not try to make dramatic changes to your diet all at once. You might feel that you are missing out on your favorite foods and then be more likely to fail. · Start slowly, and gradually change your habits. Try some of the following:  ¨ Use whole wheat bread instead of white bread. ¨ Use nonfat or low-fat milk instead of whole milk. ¨ Eat brown rice instead of white rice, and eat whole wheat pasta instead of white-flour pasta. ¨ Try low-fat cheeses and low-fat yogurt. ¨ Add more fruits and vegetables to meals and have them for snacks. ¨ Add lettuce, tomato, cucumber, and onion to sandwiches. ¨ Add fruit to yogurt and cereal.  Enjoy food  · You can still eat your favorite foods. You just may need to eat less of them. If your favorite foods are high in fat, salt, and sugar, limit how often you eat them, but do not cut them out entirely. · Eat a wide variety of foods. Make healthy choices when eating out  · The type of restaurant you choose can help you make healthy choices. Even fast-food chains are now offering more low-fat or healthier choices on the menu. · Choose smaller portions, or take half of your meal home. · When eating out, try:  ¨ A veggie pizza with a whole wheat crust or grilled chicken (instead of sausage or pepperoni).   ¨ Pasta with roasted vegetables, grilled chicken, or and call your doctor if you are having problems. It's also a good idea to know your test results and keep a list of the medicines you take. How can you care for yourself at home? Getting started  · Start slowly and set a short-term goal. For example, walk for 5 or 10 minutes every day. · Bit by bit, increase the amount you walk every day. Try for at least 30 minutes on most days of the week. You also may want to swim, bike, or do other activities. · If finding enough time is a problem, it is fine to be active in blocks of 10 minutes or more throughout your day and week. · To get the heart-healthy benefits of walking, you need to walk briskly enough to increase your heart rate and breathing, but not so fast that you cannot talk comfortably. · Wear comfortable shoes that fit well and provide good support for your feet and ankles. Staying with your plan  · After you've made walking a habit, set a longer-term goal. You may want to set a goal of walking briskly for longer or walking farther. Experts say to do 2½ hours of moderate activity a week. A faster heartbeat is what defines moderate-level activity. · To stay motivated, walk with friends, coworkers, or pets. · Use a phone karl or pedometer to track your steps each day. Set a goal to increase your steps. Once you get there, set a higher goal. Aim for 10,000 steps a day. · If the weather keeps you from walking outside, go for walks at the mall with a friend. Local schools and churches may have indoor gyms where you can walk. Fitting a walk into your workday  · Park several blocks away from work, or get off the bus a few stops early. · Use the stairs instead of the elevator, at least for a few floors. · Suggest holding meetings with colleagues during a walk inside or outside the building. · Use the restroom that is the farthest from your desk or workstation. · Use your morning and afternoon breaks to take quick 15-minute walks.   Staying safe  · Know your doctor can recommend the best influenza virus vaccine for your child. This vaccine is given as an injection (shot) into a muscle. You will receive this injection in a doctor's office or other clinic setting. You should receive a flu vaccine every year. Your immunity will gradually decrease over the 12 months after you receive the influenza virus vaccine. Children receiving this vaccine may need a booster shot one month after receiving the first vaccine. The influenza virus vaccine is usually given in October or November. Some people may need to have their vaccines earlier or later. Follow your doctor's instructions. Your doctor may recommend treating fever and pain with an aspirin-free pain reliever such as acetaminophen (Tylenol) or ibuprofen (Motrin, Advil, and others) when the shot is given and for the next 24 hours. Follow the label directions or your doctor's instructions about how much of this medicine to give your child. It is especially important to prevent fever from occurring in a child who has a seizure disorder such as epilepsy. What happens if I miss a dose? Since flu shots are usually given only one time per year, you will most likely not be on a dosing schedule. Call your doctor if you forget to receive your yearly flu shot in October or November. If your child misses a booster dose of this vaccine, call your doctor for instructions. What happens if I overdose? An overdose of this vaccine is unlikely to occur. What should I avoid before or after receiving this vaccine? Follow your doctor's instructions about any restrictions on food, beverages, or activity. What are the possible side effects of influenza virus injectable vaccine? Get emergency medical help if you have signs of an allergic reaction: hives; difficulty breathing; swelling of your face, lips, tongue, or throat.   You should not receive a booster vaccine if you had a life-threatening allergic reaction after the first

## 2018-10-18 NOTE — PROGRESS NOTES
no murmur, click, rub or gallop No S3 or S4. Abdomen: Non-obese, soft, non-distended, non-tender, normal active bowel sounds, no masses palpated and no hepatosplenomegaly  Extremities: There is no clubbing, cyanosis, or edema in any of the extremities. Neurologic:  cranial nerves II-XII are grossly intact  Osteopathic Structural Examination: Patient was examined in the seated and standing positions. There was full range of motion in the cervical, thoracic, and lumbar spines. No scoliosis. No change in thoracic kyphosis or lumbar lordosis. No increased paravertebral muscle tenderness. ASSESSMENT/PLAN:    1. Elevated fasting glucose, stable  - We reviewed her fasting glucose and Hemoglobin A1c. Results showed stable control.  - She will continue to watch her diet and exercise to keep her elevated fasting glucose under control.   - We will continue to monitor for the development of diabetes. - Hemoglobin A1C; Future    2. Mixed hyperlipidemia, controlled  - She will continue to take Lipitor  - Lipid Panel; Future    3. Sick sinus syndrome ([address] stable  4. Nonischemic cardiomyopathy ([address] stable  5. Automatic implantable cardioverter-defibrillator in situ  - We will continue to monitor   - She will continue to see the cardiologists  - CBC Auto Differential; Future  - Comprehensive Metabolic Panel; Future  - Urinalysis with Microscopic; Future    6. Cerebral infarction, unspecified mechanism ([address] stable  7. Expressive aphasia, stable  - We will continue to monitor     8. Anxiety, stable  - We will continue to monitor     9. Influenza vaccine needed  - We gave her the flu shot that she requested.    - INFLUENZA, HIGH DOSE, 65 YRS +, IM, PF, PREFILL SYR, 0.5ML (FLUZONE HD)      Orders Placed This Encounter   Procedures    INFLUENZA, HIGH DOSE, 65 YRS +, IM, PF, PREFILL SYR, 0.5ML (FLUZONE HD)    Hemoglobin A1C     Standing Status:   Future     Standing Expiration Date:   10/18/2019    CBC Auto Differential

## 2019-01-01 ENCOUNTER — APPOINTMENT (OUTPATIENT)
Dept: GENERAL RADIOLOGY | Age: 69
DRG: 981 | End: 2019-01-01
Payer: MEDICARE

## 2019-01-01 ENCOUNTER — OFFICE VISIT (OUTPATIENT)
Dept: CARDIOLOGY | Age: 69
End: 2019-01-01
Payer: MEDICARE

## 2019-01-01 ENCOUNTER — CARE COORDINATION (OUTPATIENT)
Dept: CASE MANAGEMENT | Age: 69
End: 2019-01-01

## 2019-01-01 ENCOUNTER — APPOINTMENT (OUTPATIENT)
Dept: NUCLEAR MEDICINE | Age: 69
DRG: 981 | End: 2019-01-01
Payer: MEDICARE

## 2019-01-01 ENCOUNTER — ANESTHESIA (OUTPATIENT)
Dept: OPERATING ROOM | Age: 69
DRG: 981 | End: 2019-01-01
Payer: MEDICARE

## 2019-01-01 ENCOUNTER — ANESTHESIA EVENT (OUTPATIENT)
Dept: OPERATING ROOM | Age: 69
DRG: 981 | End: 2019-01-01
Payer: MEDICARE

## 2019-01-01 ENCOUNTER — PROCEDURE VISIT (OUTPATIENT)
Dept: CARDIOLOGY | Age: 69
End: 2019-01-01
Payer: MEDICARE

## 2019-01-01 ENCOUNTER — APPOINTMENT (OUTPATIENT)
Dept: CT IMAGING | Age: 69
DRG: 981 | End: 2019-01-01
Payer: MEDICARE

## 2019-01-01 ENCOUNTER — APPOINTMENT (OUTPATIENT)
Dept: INTERVENTIONAL RADIOLOGY/VASCULAR | Age: 69
DRG: 981 | End: 2019-01-01
Payer: MEDICARE

## 2019-01-01 ENCOUNTER — HOSPITAL ENCOUNTER (INPATIENT)
Age: 69
LOS: 15 days | Discharge: NURSING, REHAB OR LONG-TERM CARE | DRG: 981 | End: 2019-04-12
Attending: EMERGENCY MEDICINE | Admitting: PSYCHIATRY & NEUROLOGY
Payer: MEDICARE

## 2019-01-01 VITALS
DIASTOLIC BLOOD PRESSURE: 58 MMHG | WEIGHT: 110 LBS | HEIGHT: 64 IN | HEART RATE: 76 BPM | BODY MASS INDEX: 18.78 KG/M2 | SYSTOLIC BLOOD PRESSURE: 104 MMHG

## 2019-01-01 VITALS
SYSTOLIC BLOOD PRESSURE: 123 MMHG | BODY MASS INDEX: 18.52 KG/M2 | RESPIRATION RATE: 16 BRPM | TEMPERATURE: 97.3 F | WEIGHT: 108.47 LBS | DIASTOLIC BLOOD PRESSURE: 46 MMHG | HEART RATE: 73 BPM | OXYGEN SATURATION: 98 % | HEIGHT: 64 IN

## 2019-01-01 VITALS — SYSTOLIC BLOOD PRESSURE: 99 MMHG | TEMPERATURE: 96.7 F | DIASTOLIC BLOOD PRESSURE: 61 MMHG | OXYGEN SATURATION: 93 %

## 2019-01-01 DIAGNOSIS — I34.0 MILD MITRAL REGURGITATION: ICD-10-CM

## 2019-01-01 DIAGNOSIS — I49.5 SICK SINUS SYNDROME (HCC): ICD-10-CM

## 2019-01-01 DIAGNOSIS — Z95.810 AUTOMATIC IMPLANTABLE CARDIOVERTER-DEFIBRILLATOR IN SITU: ICD-10-CM

## 2019-01-01 DIAGNOSIS — I49.5 SICK SINUS SYNDROME (HCC): Primary | ICD-10-CM

## 2019-01-01 DIAGNOSIS — I42.8 NONISCHEMIC CARDIOMYOPATHY (HCC): ICD-10-CM

## 2019-01-01 DIAGNOSIS — R29.90 STROKE-LIKE SYMPTOMS: Primary | ICD-10-CM

## 2019-01-01 LAB
% CKMB: 3.6 % (ref 0–3)
-: ABNORMAL
-: NORMAL
-: NORMAL
ABSOLUTE EOS #: 0 K/UL (ref 0–0.4)
ABSOLUTE EOS #: 0.03 K/UL (ref 0–0.44)
ABSOLUTE EOS #: 0.03 K/UL (ref 0–0.44)
ABSOLUTE EOS #: 0.06 K/UL (ref 0–0.44)
ABSOLUTE EOS #: 0.07 K/UL (ref 0–0.44)
ABSOLUTE EOS #: 0.09 K/UL (ref 0–0.44)
ABSOLUTE EOS #: 0.1 K/UL (ref 0–0.44)
ABSOLUTE EOS #: 0.1 K/UL (ref 0–0.44)
ABSOLUTE EOS #: 0.11 K/UL (ref 0–0.44)
ABSOLUTE EOS #: 0.13 K/UL (ref 0–0.44)
ABSOLUTE EOS #: 0.13 K/UL (ref 0–0.44)
ABSOLUTE EOS #: <0.03 K/UL (ref 0–0.44)
ABSOLUTE EOS #: <0.03 K/UL (ref 0–0.44)
ABSOLUTE IMMATURE GRANULOCYTE: 0 K/UL (ref 0–0.3)
ABSOLUTE IMMATURE GRANULOCYTE: 0.03 K/UL (ref 0–0.3)
ABSOLUTE IMMATURE GRANULOCYTE: 0.04 K/UL (ref 0–0.3)
ABSOLUTE IMMATURE GRANULOCYTE: 0.05 K/UL (ref 0–0.3)
ABSOLUTE IMMATURE GRANULOCYTE: 0.06 K/UL (ref 0–0.3)
ABSOLUTE IMMATURE GRANULOCYTE: 0.07 K/UL (ref 0–0.3)
ABSOLUTE IMMATURE GRANULOCYTE: 0.09 K/UL (ref 0–0.3)
ABSOLUTE IMMATURE GRANULOCYTE: 0.12 K/UL (ref 0–0.3)
ABSOLUTE IMMATURE GRANULOCYTE: 0.14 K/UL (ref 0–0.3)
ABSOLUTE LYMPH #: 0.46 K/UL (ref 1–4.8)
ABSOLUTE LYMPH #: 0.59 K/UL (ref 1–4.8)
ABSOLUTE LYMPH #: 0.74 K/UL (ref 1–4.8)
ABSOLUTE LYMPH #: 0.81 K/UL (ref 1.1–3.7)
ABSOLUTE LYMPH #: 0.97 K/UL (ref 1.1–3.7)
ABSOLUTE LYMPH #: 1.2 K/UL (ref 1.1–3.7)
ABSOLUTE LYMPH #: 1.21 K/UL (ref 1.1–3.7)
ABSOLUTE LYMPH #: 1.21 K/UL (ref 1.1–3.7)
ABSOLUTE LYMPH #: 1.27 K/UL (ref 1.1–3.7)
ABSOLUTE LYMPH #: 1.29 K/UL (ref 1.1–3.7)
ABSOLUTE LYMPH #: 1.33 K/UL (ref 1.1–3.7)
ABSOLUTE LYMPH #: 1.37 K/UL (ref 1.1–3.7)
ABSOLUTE LYMPH #: 1.39 K/UL (ref 1.1–3.7)
ABSOLUTE LYMPH #: 1.44 K/UL (ref 1.1–3.7)
ABSOLUTE LYMPH #: 1.71 K/UL (ref 1.1–3.7)
ABSOLUTE MONO #: 0.2 K/UL (ref 0.1–0.8)
ABSOLUTE MONO #: 0.2 K/UL (ref 0.1–0.8)
ABSOLUTE MONO #: 0.2 K/UL (ref 0.1–1.2)
ABSOLUTE MONO #: 0.25 K/UL (ref 0.1–0.8)
ABSOLUTE MONO #: 0.35 K/UL (ref 0.1–1.2)
ABSOLUTE MONO #: 0.46 K/UL (ref 0.1–1.2)
ABSOLUTE MONO #: 0.53 K/UL (ref 0.1–1.2)
ABSOLUTE MONO #: 0.61 K/UL (ref 0.1–1.2)
ABSOLUTE MONO #: 0.73 K/UL (ref 0.1–1.2)
ABSOLUTE MONO #: 0.77 K/UL (ref 0.1–1.2)
ABSOLUTE MONO #: 0.78 K/UL (ref 0.1–1.2)
ABSOLUTE MONO #: 0.8 K/UL (ref 0.1–1.2)
ABSOLUTE MONO #: 0.83 K/UL (ref 0.1–1.2)
ABSOLUTE MONO #: 0.89 K/UL (ref 0.1–1.2)
ABSOLUTE MONO #: 1.11 K/UL (ref 0.1–1.2)
ALBUMIN SERPL-MCNC: 3.3 G/DL (ref 3.5–5.2)
ALBUMIN SERPL-MCNC: 3.4 G/DL (ref 3.5–5.2)
ALBUMIN/GLOBULIN RATIO: 1.1 (ref 1–2.5)
ALBUMIN/GLOBULIN RATIO: 1.1 (ref 1–2.5)
ALLEN TEST: ABNORMAL
ALLEN TEST: POSITIVE
ALP BLD-CCNC: 50 U/L (ref 35–104)
ALP BLD-CCNC: 60 U/L (ref 35–104)
ALT SERPL-CCNC: 35 U/L (ref 5–33)
ALT SERPL-CCNC: 36 U/L (ref 5–33)
AMORPHOUS: ABNORMAL
ANION GAP SERPL CALCULATED.3IONS-SCNC: 10 MMOL/L (ref 9–17)
ANION GAP SERPL CALCULATED.3IONS-SCNC: 11 MMOL/L (ref 9–17)
ANION GAP SERPL CALCULATED.3IONS-SCNC: 11 MMOL/L (ref 9–17)
ANION GAP SERPL CALCULATED.3IONS-SCNC: 14 MMOL/L (ref 9–17)
ANION GAP SERPL CALCULATED.3IONS-SCNC: 16 MMOL/L (ref 9–17)
ANION GAP SERPL CALCULATED.3IONS-SCNC: 7 MMOL/L (ref 9–17)
ANION GAP SERPL CALCULATED.3IONS-SCNC: 7 MMOL/L (ref 9–17)
ANION GAP SERPL CALCULATED.3IONS-SCNC: 9 MMOL/L (ref 9–17)
AST SERPL-CCNC: 41 U/L
AST SERPL-CCNC: 51 U/L
BACTERIA: ABNORMAL
BASOPHILS # BLD: 0 % (ref 0–2)
BASOPHILS # BLD: 1 % (ref 0–2)
BASOPHILS ABSOLUTE: 0 K/UL (ref 0–0.2)
BASOPHILS ABSOLUTE: 0.03 K/UL (ref 0–0.2)
BASOPHILS ABSOLUTE: 0.03 K/UL (ref 0–0.2)
BASOPHILS ABSOLUTE: 0.04 K/UL (ref 0–0.2)
BASOPHILS ABSOLUTE: 0.05 K/UL (ref 0–0.2)
BASOPHILS ABSOLUTE: <0.03 K/UL (ref 0–0.2)
BILIRUB SERPL-MCNC: 0.52 MG/DL (ref 0.3–1.2)
BILIRUB SERPL-MCNC: 0.95 MG/DL (ref 0.3–1.2)
BILIRUBIN URINE: NEGATIVE
BUN BLDV-MCNC: 11 MG/DL (ref 8–23)
BUN BLDV-MCNC: 12 MG/DL (ref 8–23)
BUN BLDV-MCNC: 12 MG/DL (ref 8–23)
BUN BLDV-MCNC: 13 MG/DL (ref 8–23)
BUN BLDV-MCNC: 16 MG/DL (ref 8–23)
BUN BLDV-MCNC: 18 MG/DL (ref 8–23)
BUN BLDV-MCNC: 18 MG/DL (ref 8–23)
BUN BLDV-MCNC: 19 MG/DL (ref 8–23)
BUN BLDV-MCNC: 22 MG/DL (ref 8–23)
BUN BLDV-MCNC: 23 MG/DL (ref 8–23)
BUN BLDV-MCNC: 25 MG/DL (ref 8–23)
BUN BLDV-MCNC: 27 MG/DL (ref 8–23)
BUN BLDV-MCNC: 31 MG/DL (ref 8–23)
BUN/CREAT BLD: ABNORMAL (ref 9–20)
CALCIUM IONIZED: 1.14 MMOL/L (ref 1.13–1.33)
CALCIUM IONIZED: 1.16 MMOL/L (ref 1.13–1.33)
CALCIUM SERPL-MCNC: 7.8 MG/DL (ref 8.6–10.4)
CALCIUM SERPL-MCNC: 8 MG/DL (ref 8.6–10.4)
CALCIUM SERPL-MCNC: 8.1 MG/DL (ref 8.6–10.4)
CALCIUM SERPL-MCNC: 8.2 MG/DL (ref 8.6–10.4)
CALCIUM SERPL-MCNC: 8.3 MG/DL (ref 8.6–10.4)
CALCIUM SERPL-MCNC: 8.4 MG/DL (ref 8.6–10.4)
CALCIUM SERPL-MCNC: 8.5 MG/DL (ref 8.6–10.4)
CALCIUM SERPL-MCNC: 8.6 MG/DL (ref 8.6–10.4)
CALCIUM SERPL-MCNC: 8.7 MG/DL (ref 8.6–10.4)
CALCIUM SERPL-MCNC: 8.9 MG/DL (ref 8.6–10.4)
CALCIUM SERPL-MCNC: 9 MG/DL (ref 8.6–10.4)
CARBOXYHEMOGLOBIN: 0.9 % (ref 0–5)
CASTS UA: ABNORMAL /LPF (ref 0–8)
CHLORIDE BLD-SCNC: 101 MMOL/L (ref 98–107)
CHLORIDE BLD-SCNC: 101 MMOL/L (ref 98–107)
CHLORIDE BLD-SCNC: 103 MMOL/L (ref 98–107)
CHLORIDE BLD-SCNC: 105 MMOL/L (ref 98–107)
CHLORIDE BLD-SCNC: 106 MMOL/L (ref 98–107)
CHLORIDE BLD-SCNC: 106 MMOL/L (ref 98–107)
CHLORIDE BLD-SCNC: 109 MMOL/L (ref 98–107)
CHLORIDE BLD-SCNC: 111 MMOL/L (ref 98–107)
CHLORIDE BLD-SCNC: 112 MMOL/L (ref 98–107)
CHLORIDE BLD-SCNC: 112 MMOL/L (ref 98–107)
CHLORIDE BLD-SCNC: 113 MMOL/L (ref 98–107)
CHLORIDE BLD-SCNC: 114 MMOL/L (ref 98–107)
CHLORIDE BLD-SCNC: 98 MMOL/L (ref 98–107)
CHLORIDE BLD-SCNC: 99 MMOL/L (ref 98–107)
CHLORIDE BLD-SCNC: 99 MMOL/L (ref 98–107)
CHOLESTEROL/HDL RATIO: 1.9
CHOLESTEROL: 111 MG/DL
CK MB: 8.8 NG/ML
CKMB INTERPRETATION: ABNORMAL
CO2: 18 MMOL/L (ref 20–31)
CO2: 18 MMOL/L (ref 20–31)
CO2: 19 MMOL/L (ref 20–31)
CO2: 19 MMOL/L (ref 20–31)
CO2: 20 MMOL/L (ref 20–31)
CO2: 20 MMOL/L (ref 20–31)
CO2: 22 MMOL/L (ref 20–31)
CO2: 26 MMOL/L (ref 20–31)
CO2: 26 MMOL/L (ref 20–31)
CO2: 28 MMOL/L (ref 20–31)
CO2: 29 MMOL/L (ref 20–31)
CO2: 31 MMOL/L (ref 20–31)
CO2: 31 MMOL/L (ref 20–31)
COLOR: YELLOW
CREAT SERPL-MCNC: 0.55 MG/DL (ref 0.5–0.9)
CREAT SERPL-MCNC: 0.59 MG/DL (ref 0.5–0.9)
CREAT SERPL-MCNC: 0.62 MG/DL (ref 0.5–0.9)
CREAT SERPL-MCNC: 0.66 MG/DL (ref 0.5–0.9)
CREAT SERPL-MCNC: 0.66 MG/DL (ref 0.5–0.9)
CREAT SERPL-MCNC: 0.67 MG/DL (ref 0.5–0.9)
CREAT SERPL-MCNC: 0.69 MG/DL (ref 0.5–0.9)
CREAT SERPL-MCNC: 0.7 MG/DL (ref 0.5–0.9)
CREAT SERPL-MCNC: 0.72 MG/DL (ref 0.5–0.9)
CREAT SERPL-MCNC: 0.72 MG/DL (ref 0.5–0.9)
CREAT SERPL-MCNC: 0.74 MG/DL (ref 0.5–0.9)
CREAT SERPL-MCNC: 0.75 MG/DL (ref 0.5–0.9)
CREAT SERPL-MCNC: 0.78 MG/DL (ref 0.5–0.9)
CREAT SERPL-MCNC: 0.86 MG/DL (ref 0.5–0.9)
CREAT SERPL-MCNC: 0.97 MG/DL (ref 0.5–0.9)
CRYSTALS, UA: ABNORMAL /HPF
CULTURE: NO GROWTH
CULTURE: NORMAL
DIFFERENTIAL TYPE: ABNORMAL
DIRECT EXAM: NORMAL
EKG ATRIAL RATE: 85 BPM
EKG ATRIAL RATE: 98 BPM
EKG P AXIS: 63 DEGREES
EKG P AXIS: 75 DEGREES
EKG P-R INTERVAL: 156 MS
EKG P-R INTERVAL: 174 MS
EKG Q-T INTERVAL: 424 MS
EKG Q-T INTERVAL: 462 MS
EKG QRS DURATION: 60 MS
EKG QRS DURATION: 76 MS
EKG QTC CALCULATION (BAZETT): 541 MS
EKG QTC CALCULATION (BAZETT): 549 MS
EKG R AXIS: -28 DEGREES
EKG R AXIS: 1 DEGREES
EKG T AXIS: 6 DEGREES
EKG T AXIS: 72 DEGREES
EKG VENTRICULAR RATE: 85 BPM
EKG VENTRICULAR RATE: 98 BPM
EOSINOPHILS RELATIVE PERCENT: 0 % (ref 1–4)
EOSINOPHILS RELATIVE PERCENT: 1 % (ref 1–4)
EPITHELIAL CELLS UA: ABNORMAL /HPF (ref 0–5)
FIO2: 30
FIO2: ABNORMAL
FIO2: ABNORMAL
GFR AFRICAN AMERICAN: >60 ML/MIN
GFR NON-AFRICAN AMERICAN: 57 ML/MIN
GFR NON-AFRICAN AMERICAN: >60 ML/MIN
GFR SERPL CREATININE-BSD FRML MDRD: ABNORMAL ML/MIN/{1.73_M2}
GLUCOSE BLD-MCNC: 102 MG/DL (ref 70–99)
GLUCOSE BLD-MCNC: 104 MG/DL (ref 70–99)
GLUCOSE BLD-MCNC: 106 MG/DL (ref 74–100)
GLUCOSE BLD-MCNC: 108 MG/DL (ref 70–99)
GLUCOSE BLD-MCNC: 111 MG/DL (ref 70–99)
GLUCOSE BLD-MCNC: 118 MG/DL (ref 70–99)
GLUCOSE BLD-MCNC: 131 MG/DL (ref 70–99)
GLUCOSE BLD-MCNC: 136 MG/DL (ref 70–99)
GLUCOSE BLD-MCNC: 137 MG/DL (ref 70–99)
GLUCOSE BLD-MCNC: 137 MG/DL (ref 70–99)
GLUCOSE BLD-MCNC: 139 MG/DL (ref 70–99)
GLUCOSE BLD-MCNC: 142 MG/DL (ref 70–99)
GLUCOSE BLD-MCNC: 144 MG/DL (ref 70–99)
GLUCOSE BLD-MCNC: 155 MG/DL (ref 70–99)
GLUCOSE BLD-MCNC: 156 MG/DL (ref 70–99)
GLUCOSE BLD-MCNC: 97 MG/DL (ref 70–99)
GLUCOSE URINE: NEGATIVE
HCO3 VENOUS: 20.3 MMOL/L (ref 24–30)
HCT VFR BLD CALC: 30 % (ref 36.3–47.1)
HCT VFR BLD CALC: 32.2 % (ref 36.3–47.1)
HCT VFR BLD CALC: 36 % (ref 36.3–47.1)
HCT VFR BLD CALC: 37.2 % (ref 36.3–47.1)
HCT VFR BLD CALC: 37.6 % (ref 36.3–47.1)
HCT VFR BLD CALC: 37.7 % (ref 36.3–47.1)
HCT VFR BLD CALC: 37.8 % (ref 36.3–47.1)
HCT VFR BLD CALC: 38 % (ref 36.3–47.1)
HCT VFR BLD CALC: 39 % (ref 36.3–47.1)
HCT VFR BLD CALC: 39.6 % (ref 36.3–47.1)
HCT VFR BLD CALC: 39.7 % (ref 36.3–47.1)
HCT VFR BLD CALC: 40.9 % (ref 36.3–47.1)
HCT VFR BLD CALC: 41.6 % (ref 36.3–47.1)
HCT VFR BLD CALC: 41.7 % (ref 36.3–47.1)
HCT VFR BLD CALC: 42.8 % (ref 36.3–47.1)
HCT VFR BLD CALC: 43.2 % (ref 36.3–47.1)
HDLC SERPL-MCNC: 57 MG/DL
HEMOGLOBIN: 10.4 G/DL (ref 11.9–15.1)
HEMOGLOBIN: 11.6 G/DL (ref 11.9–15.1)
HEMOGLOBIN: 11.7 G/DL (ref 11.9–15.1)
HEMOGLOBIN: 11.8 G/DL (ref 11.9–15.1)
HEMOGLOBIN: 11.9 G/DL (ref 11.9–15.1)
HEMOGLOBIN: 12 G/DL (ref 11.9–15.1)
HEMOGLOBIN: 12.2 G/DL (ref 11.9–15.1)
HEMOGLOBIN: 12.3 G/DL (ref 11.9–15.1)
HEMOGLOBIN: 12.6 G/DL (ref 11.9–15.1)
HEMOGLOBIN: 12.7 G/DL (ref 11.9–15.1)
HEMOGLOBIN: 12.9 G/DL (ref 11.9–15.1)
HEMOGLOBIN: 13 G/DL (ref 11.9–15.1)
HEMOGLOBIN: 13 G/DL (ref 11.9–15.1)
HEMOGLOBIN: 14 G/DL (ref 11.9–15.1)
HEMOGLOBIN: 14.1 G/DL (ref 11.9–15.1)
HEMOGLOBIN: 9.6 G/DL (ref 11.9–15.1)
IMMATURE GRANULOCYTES: 0 %
IMMATURE GRANULOCYTES: 1 %
INR BLD: 1
KETONES, URINE: ABNORMAL
LACTIC ACID, WHOLE BLOOD: 1.7 MMOL/L (ref 0.7–2.1)
LDL CHOLESTEROL: 40 MG/DL (ref 0–130)
LEUKOCYTE ESTERASE, URINE: NEGATIVE
LV EF: 28 %
LV EF: 35 %
LVEF MODALITY: NORMAL
LVEF MODALITY: NORMAL
LYMPHOCYTES # BLD: 10 % (ref 24–43)
LYMPHOCYTES # BLD: 11 % (ref 24–43)
LYMPHOCYTES # BLD: 12 % (ref 24–43)
LYMPHOCYTES # BLD: 12 % (ref 24–43)
LYMPHOCYTES # BLD: 13 % (ref 24–43)
LYMPHOCYTES # BLD: 14 % (ref 24–43)
LYMPHOCYTES # BLD: 14 % (ref 24–43)
LYMPHOCYTES # BLD: 15 % (ref 24–43)
LYMPHOCYTES # BLD: 39 % (ref 24–43)
LYMPHOCYTES # BLD: 6 % (ref 24–43)
LYMPHOCYTES # BLD: 6 % (ref 24–44)
LYMPHOCYTES # BLD: 7 % (ref 24–44)
LYMPHOCYTES # BLD: 9 % (ref 24–44)
Lab: NORMAL
MAGNESIUM: 1.6 MG/DL (ref 1.6–2.6)
MAGNESIUM: 1.7 MG/DL (ref 1.6–2.6)
MAGNESIUM: 1.7 MG/DL (ref 1.6–2.6)
MAGNESIUM: 1.8 MG/DL (ref 1.6–2.6)
MAGNESIUM: 2 MG/DL (ref 1.6–2.6)
MAGNESIUM: 2.1 MG/DL (ref 1.6–2.6)
MAGNESIUM: 2.2 MG/DL (ref 1.6–2.6)
MAGNESIUM: 2.3 MG/DL (ref 1.6–2.6)
MAGNESIUM: 2.4 MG/DL (ref 1.6–2.6)
MCH RBC QN AUTO: 30.3 PG (ref 25.2–33.5)
MCH RBC QN AUTO: 30.4 PG (ref 25.2–33.5)
MCH RBC QN AUTO: 30.5 PG (ref 25.2–33.5)
MCH RBC QN AUTO: 30.5 PG (ref 25.2–33.5)
MCH RBC QN AUTO: 30.6 PG (ref 25.2–33.5)
MCH RBC QN AUTO: 30.6 PG (ref 25.2–33.5)
MCH RBC QN AUTO: 30.7 PG (ref 25.2–33.5)
MCH RBC QN AUTO: 30.8 PG (ref 25.2–33.5)
MCH RBC QN AUTO: 30.9 PG (ref 25.2–33.5)
MCH RBC QN AUTO: 30.9 PG (ref 25.2–33.5)
MCH RBC QN AUTO: 31 PG (ref 25.2–33.5)
MCH RBC QN AUTO: 31.2 PG (ref 25.2–33.5)
MCHC RBC AUTO-ENTMCNC: 30.8 G/DL (ref 28.4–34.8)
MCHC RBC AUTO-ENTMCNC: 31.2 G/DL (ref 28.4–34.8)
MCHC RBC AUTO-ENTMCNC: 31.2 G/DL (ref 28.4–34.8)
MCHC RBC AUTO-ENTMCNC: 31.3 G/DL (ref 28.4–34.8)
MCHC RBC AUTO-ENTMCNC: 31.5 G/DL (ref 28.4–34.8)
MCHC RBC AUTO-ENTMCNC: 31.5 G/DL (ref 28.4–34.8)
MCHC RBC AUTO-ENTMCNC: 31.6 G/DL (ref 28.4–34.8)
MCHC RBC AUTO-ENTMCNC: 31.8 G/DL (ref 28.4–34.8)
MCHC RBC AUTO-ENTMCNC: 32 G/DL (ref 28.4–34.8)
MCHC RBC AUTO-ENTMCNC: 32.3 G/DL (ref 28.4–34.8)
MCHC RBC AUTO-ENTMCNC: 32.4 G/DL (ref 28.4–34.8)
MCHC RBC AUTO-ENTMCNC: 32.4 G/DL (ref 28.4–34.8)
MCHC RBC AUTO-ENTMCNC: 32.5 G/DL (ref 28.4–34.8)
MCHC RBC AUTO-ENTMCNC: 32.9 G/DL (ref 28.4–34.8)
MCV RBC AUTO: 93.9 FL (ref 82.6–102.9)
MCV RBC AUTO: 94.1 FL (ref 82.6–102.9)
MCV RBC AUTO: 94.2 FL (ref 82.6–102.9)
MCV RBC AUTO: 94.3 FL (ref 82.6–102.9)
MCV RBC AUTO: 94.7 FL (ref 82.6–102.9)
MCV RBC AUTO: 95.1 FL (ref 82.6–102.9)
MCV RBC AUTO: 96.4 FL (ref 82.6–102.9)
MCV RBC AUTO: 96.4 FL (ref 82.6–102.9)
MCV RBC AUTO: 96.5 FL (ref 82.6–102.9)
MCV RBC AUTO: 96.5 FL (ref 82.6–102.9)
MCV RBC AUTO: 97.4 FL (ref 82.6–102.9)
MCV RBC AUTO: 97.5 FL (ref 82.6–102.9)
MCV RBC AUTO: 97.5 FL (ref 82.6–102.9)
MCV RBC AUTO: 98.3 FL (ref 82.6–102.9)
MCV RBC AUTO: 99 FL (ref 82.6–102.9)
MCV RBC AUTO: 99.2 FL (ref 82.6–102.9)
METHEMOGLOBIN: ABNORMAL % (ref 0–1.5)
MODE: ABNORMAL
MONOCYTES # BLD: 2 % (ref 1–7)
MONOCYTES # BLD: 3 % (ref 1–7)
MONOCYTES # BLD: 3 % (ref 1–7)
MONOCYTES # BLD: 3 % (ref 3–12)
MONOCYTES # BLD: 5 % (ref 3–12)
MONOCYTES # BLD: 5 % (ref 3–12)
MONOCYTES # BLD: 6 % (ref 3–12)
MONOCYTES # BLD: 7 % (ref 3–12)
MONOCYTES # BLD: 7 % (ref 3–12)
MONOCYTES # BLD: 8 % (ref 3–12)
MONOCYTES # BLD: 9 % (ref 3–12)
MORPHOLOGY: NORMAL
MRSA, DNA, NASAL: NORMAL
MUCUS: ABNORMAL
MYOGLOBIN: 579 NG/ML (ref 25–58)
NEGATIVE BASE EXCESS, ART: 1 (ref 0–2)
NEGATIVE BASE EXCESS, ART: 2 (ref 0–2)
NEGATIVE BASE EXCESS, ART: 2 (ref 0–2)
NEGATIVE BASE EXCESS, ART: 3 (ref 0–2)
NEGATIVE BASE EXCESS, ART: 3 (ref 0–2)
NEGATIVE BASE EXCESS, ART: 4 (ref 0–2)
NEGATIVE BASE EXCESS, ART: ABNORMAL (ref 0–2)
NEGATIVE BASE EXCESS, VEN: 3.7 MMOL/L (ref 0–2)
NITRITE, URINE: NEGATIVE
NOTIFICATION TIME: ABNORMAL
NOTIFICATION: ABNORMAL
NRBC AUTOMATED: 0 PER 100 WBC
O2 DEVICE/FLOW/%: ABNORMAL
O2 SAT, VEN: 69.3 % (ref 60–85)
OTHER OBSERVATIONS UA: ABNORMAL
OXYHEMOGLOBIN: ABNORMAL % (ref 95–98)
PARTIAL THROMBOPLASTIN TIME: 20.7 SEC (ref 20.5–30.5)
PARTIAL THROMBOPLASTIN TIME: 21 SEC (ref 20.5–30.5)
PARTIAL THROMBOPLASTIN TIME: 22.9 SEC (ref 20.5–30.5)
PARTIAL THROMBOPLASTIN TIME: 23.1 SEC (ref 20.5–30.5)
PARTIAL THROMBOPLASTIN TIME: 25.3 SEC (ref 20.5–30.5)
PARTIAL THROMBOPLASTIN TIME: 26.7 SEC (ref 20.5–30.5)
PARTIAL THROMBOPLASTIN TIME: 30.3 SEC (ref 20.5–30.5)
PARTIAL THROMBOPLASTIN TIME: 30.7 SEC (ref 20.5–30.5)
PARTIAL THROMBOPLASTIN TIME: 37.2 SEC (ref 20.5–30.5)
PARTIAL THROMBOPLASTIN TIME: 64.2 SEC (ref 20.5–30.5)
PARTIAL THROMBOPLASTIN TIME: 69.2 SEC (ref 20.5–30.5)
PATIENT TEMP: 37
PATIENT TEMP: ABNORMAL
PCO2, VEN, TEMP ADJ: ABNORMAL MMHG (ref 39–55)
PCO2, VEN: 35.6 (ref 39–55)
PDW BLD-RTO: 13 % (ref 11.8–14.4)
PDW BLD-RTO: 13.1 % (ref 11.8–14.4)
PDW BLD-RTO: 13.1 % (ref 11.8–14.4)
PDW BLD-RTO: 13.2 % (ref 11.8–14.4)
PDW BLD-RTO: 13.4 % (ref 11.8–14.4)
PDW BLD-RTO: 13.4 % (ref 11.8–14.4)
PDW BLD-RTO: 13.5 % (ref 11.8–14.4)
PDW BLD-RTO: 13.6 % (ref 11.8–14.4)
PDW BLD-RTO: 13.7 % (ref 11.8–14.4)
PDW BLD-RTO: 13.8 % (ref 11.8–14.4)
PDW BLD-RTO: 14 % (ref 11.8–14.4)
PDW BLD-RTO: 14 % (ref 11.8–14.4)
PEEP/CPAP: ABNORMAL
PH UA: 5 (ref 5–8)
PH VENOUS: 7.38 (ref 7.32–7.42)
PH, VEN, TEMP ADJ: ABNORMAL (ref 7.32–7.42)
PHOSPHORUS: 1.8 MG/DL (ref 2.6–4.5)
PHOSPHORUS: 2.1 MG/DL (ref 2.6–4.5)
PHOSPHORUS: 2.5 MG/DL (ref 2.6–4.5)
PHOSPHORUS: 2.6 MG/DL (ref 2.6–4.5)
PHOSPHORUS: 2.7 MG/DL (ref 2.6–4.5)
PHOSPHORUS: 2.7 MG/DL (ref 2.6–4.5)
PHOSPHORUS: 2.8 MG/DL (ref 2.6–4.5)
PHOSPHORUS: 2.9 MG/DL (ref 2.6–4.5)
PHOSPHORUS: 2.9 MG/DL (ref 2.6–4.5)
PHOSPHORUS: 3.3 MG/DL (ref 2.6–4.5)
PHOSPHORUS: 3.4 MG/DL (ref 2.6–4.5)
PHOSPHORUS: 3.7 MG/DL (ref 2.6–4.5)
PHOSPHORUS: 4 MG/DL (ref 2.6–4.5)
PHOSPHORUS: 4.1 MG/DL (ref 2.6–4.5)
PHOSPHORUS: 4.2 MG/DL (ref 2.6–4.5)
PLATELET # BLD: 111 K/UL (ref 138–453)
PLATELET # BLD: 114 K/UL (ref 138–453)
PLATELET # BLD: 163 K/UL (ref 138–453)
PLATELET # BLD: 172 K/UL (ref 138–453)
PLATELET # BLD: 178 K/UL (ref 138–453)
PLATELET # BLD: 192 K/UL (ref 138–453)
PLATELET # BLD: 208 K/UL (ref 138–453)
PLATELET # BLD: 217 K/UL (ref 138–453)
PLATELET # BLD: 229 K/UL (ref 138–453)
PLATELET # BLD: 236 K/UL (ref 138–453)
PLATELET # BLD: 240 K/UL (ref 138–453)
PLATELET # BLD: 245 K/UL (ref 138–453)
PLATELET # BLD: 257 K/UL (ref 138–453)
PLATELET # BLD: ABNORMAL K/UL (ref 138–453)
PLATELET ESTIMATE: ABNORMAL
PLATELET, FLUORESCENCE: 111 K/UL (ref 138–453)
PLATELET, FLUORESCENCE: 122 K/UL (ref 138–453)
PLATELET, FLUORESCENCE: 133 K/UL (ref 138–453)
PLATELET, FLUORESCENCE: NORMAL K/UL (ref 138–453)
PLATELET, IMMATURE FRACTION: 3.1 % (ref 1.1–10.3)
PLATELET, IMMATURE FRACTION: 3.1 % (ref 1.1–10.3)
PLATELET, IMMATURE FRACTION: 3.8 % (ref 1.1–10.3)
PLATELET, IMMATURE FRACTION: NORMAL % (ref 1.1–10.3)
PMV BLD AUTO: 10.4 FL (ref 8.1–13.5)
PMV BLD AUTO: 10.7 FL (ref 8.1–13.5)
PMV BLD AUTO: 10.8 FL (ref 8.1–13.5)
PMV BLD AUTO: 10.8 FL (ref 8.1–13.5)
PMV BLD AUTO: 10.9 FL (ref 8.1–13.5)
PMV BLD AUTO: 11 FL (ref 8.1–13.5)
PMV BLD AUTO: 11.2 FL (ref 8.1–13.5)
PMV BLD AUTO: 11.2 FL (ref 8.1–13.5)
PMV BLD AUTO: 11.3 FL (ref 8.1–13.5)
PMV BLD AUTO: 11.6 FL (ref 8.1–13.5)
PMV BLD AUTO: ABNORMAL FL (ref 8.1–13.5)
PO2, VEN, TEMP ADJ: ABNORMAL MMHG (ref 30–50)
PO2, VEN: 38 (ref 30–50)
POC HCO3: 20.3 MMOL/L (ref 21–28)
POC HCO3: 20.4 MMOL/L (ref 21–28)
POC HCO3: 20.7 MMOL/L (ref 21–28)
POC HCO3: 21.7 MMOL/L (ref 21–28)
POC HCO3: 21.8 MMOL/L (ref 21–28)
POC HCO3: 22.7 MMOL/L (ref 21–28)
POC HCO3: 26.3 MMOL/L (ref 21–28)
POC O2 SATURATION: 96 % (ref 94–98)
POC O2 SATURATION: 97 % (ref 94–98)
POC O2 SATURATION: 98 % (ref 94–98)
POC O2 SATURATION: 99 % (ref 94–98)
POC PCO2 TEMP: ABNORMAL MM HG
POC PCO2: 30.6 MM HG (ref 35–48)
POC PCO2: 30.8 MM HG (ref 35–48)
POC PCO2: 31.5 MM HG (ref 35–48)
POC PCO2: 32.1 MM HG (ref 35–48)
POC PCO2: 32.8 MM HG (ref 35–48)
POC PCO2: 33.1 MM HG (ref 35–48)
POC PCO2: 38.5 MM HG (ref 35–48)
POC PH TEMP: ABNORMAL
POC PH: 7.41 (ref 7.35–7.45)
POC PH: 7.43 (ref 7.35–7.45)
POC PH: 7.43 (ref 7.35–7.45)
POC PH: 7.44 (ref 7.35–7.45)
POC PH: 7.44 (ref 7.35–7.45)
POC PH: 7.45 (ref 7.35–7.45)
POC PH: 7.45 (ref 7.35–7.45)
POC PO2 TEMP: ABNORMAL MM HG
POC PO2: 108.1 MM HG (ref 83–108)
POC PO2: 122.1 MM HG (ref 83–108)
POC PO2: 78.8 MM HG (ref 83–108)
POC PO2: 79 MM HG (ref 83–108)
POC PO2: 80 MM HG (ref 83–108)
POC PO2: 80.1 MM HG (ref 83–108)
POC PO2: 85.6 MM HG (ref 83–108)
POSITIVE BASE EXCESS, ART: 2 (ref 0–3)
POSITIVE BASE EXCESS, ART: ABNORMAL (ref 0–3)
POSITIVE BASE EXCESS, VEN: ABNORMAL MMOL/L (ref 0–2)
POTASSIUM SERPL-SCNC: 3.1 MMOL/L (ref 3.7–5.3)
POTASSIUM SERPL-SCNC: 3.5 MMOL/L (ref 3.7–5.3)
POTASSIUM SERPL-SCNC: 3.6 MMOL/L (ref 3.7–5.3)
POTASSIUM SERPL-SCNC: 3.6 MMOL/L (ref 3.7–5.3)
POTASSIUM SERPL-SCNC: 3.7 MMOL/L (ref 3.7–5.3)
POTASSIUM SERPL-SCNC: 3.7 MMOL/L (ref 3.7–5.3)
POTASSIUM SERPL-SCNC: 3.8 MMOL/L (ref 3.7–5.3)
POTASSIUM SERPL-SCNC: 4.1 MMOL/L (ref 3.7–5.3)
POTASSIUM SERPL-SCNC: 4.2 MMOL/L (ref 3.7–5.3)
POTASSIUM SERPL-SCNC: 4.2 MMOL/L (ref 3.7–5.3)
POTASSIUM SERPL-SCNC: 4.3 MMOL/L (ref 3.7–5.3)
POTASSIUM SERPL-SCNC: 4.5 MMOL/L (ref 3.7–5.3)
POTASSIUM SERPL-SCNC: 4.7 MMOL/L (ref 3.7–5.3)
POTASSIUM SERPL-SCNC: 5.6 MMOL/L (ref 3.7–5.3)
PROCALCITONIN: 0.19 NG/ML
PROCALCITONIN: 0.24 NG/ML
PROTEIN UA: NEGATIVE
PROTHROMBIN TIME: 10.5 SEC (ref 9–12)
PSV: ABNORMAL
PT. POSITION: ABNORMAL
RBC # BLD: 3.11 M/UL (ref 3.95–5.11)
RBC # BLD: 3.43 M/UL (ref 3.95–5.11)
RBC # BLD: 3.8 M/UL (ref 3.95–5.11)
RBC # BLD: 3.82 M/UL (ref 3.95–5.11)
RBC # BLD: 3.88 M/UL (ref 3.95–5.11)
RBC # BLD: 3.91 M/UL (ref 3.95–5.11)
RBC # BLD: 3.94 M/UL (ref 3.95–5.11)
RBC # BLD: 3.97 M/UL (ref 3.95–5.11)
RBC # BLD: 4 M/UL (ref 3.95–5.11)
RBC # BLD: 4.07 M/UL (ref 3.95–5.11)
RBC # BLD: 4.11 M/UL (ref 3.95–5.11)
RBC # BLD: 4.21 M/UL (ref 3.95–5.11)
RBC # BLD: 4.23 M/UL (ref 3.95–5.11)
RBC # BLD: 4.24 M/UL (ref 3.95–5.11)
RBC # BLD: 4.55 M/UL (ref 3.95–5.11)
RBC # BLD: 4.58 M/UL (ref 3.95–5.11)
RBC # BLD: ABNORMAL 10*6/UL
RBC UA: ABNORMAL /HPF (ref 0–4)
REASON FOR REJECTION: NORMAL
REASON FOR REJECTION: NORMAL
RENAL EPITHELIAL, UA: ABNORMAL /HPF
RESPIRATORY RATE: ABNORMAL
SAMPLE SITE: ABNORMAL
SEG NEUTROPHILS: 54 % (ref 36–65)
SEG NEUTROPHILS: 74 % (ref 36–65)
SEG NEUTROPHILS: 76 % (ref 36–65)
SEG NEUTROPHILS: 76 % (ref 36–65)
SEG NEUTROPHILS: 77 % (ref 36–65)
SEG NEUTROPHILS: 77 % (ref 36–65)
SEG NEUTROPHILS: 78 % (ref 36–65)
SEG NEUTROPHILS: 79 % (ref 36–65)
SEG NEUTROPHILS: 80 % (ref 36–65)
SEG NEUTROPHILS: 80 % (ref 36–65)
SEG NEUTROPHILS: 84 % (ref 36–65)
SEG NEUTROPHILS: 88 % (ref 36–66)
SEG NEUTROPHILS: 90 % (ref 36–65)
SEG NEUTROPHILS: 90 % (ref 36–66)
SEG NEUTROPHILS: 92 % (ref 36–66)
SEGMENTED NEUTROPHILS ABSOLUTE COUNT: 11.41 K/UL (ref 1.8–7.7)
SEGMENTED NEUTROPHILS ABSOLUTE COUNT: 12.79 K/UL (ref 1.5–8.1)
SEGMENTED NEUTROPHILS ABSOLUTE COUNT: 2.33 K/UL (ref 1.5–8.1)
SEGMENTED NEUTROPHILS ABSOLUTE COUNT: 5.71 K/UL (ref 1.8–7.7)
SEGMENTED NEUTROPHILS ABSOLUTE COUNT: 5.93 K/UL (ref 1.5–8.1)
SEGMENTED NEUTROPHILS ABSOLUTE COUNT: 5.94 K/UL (ref 1.8–7.7)
SEGMENTED NEUTROPHILS ABSOLUTE COUNT: 7.04 K/UL (ref 1.5–8.1)
SEGMENTED NEUTROPHILS ABSOLUTE COUNT: 7.5 K/UL (ref 1.5–8.1)
SEGMENTED NEUTROPHILS ABSOLUTE COUNT: 7.53 K/UL (ref 1.5–8.1)
SEGMENTED NEUTROPHILS ABSOLUTE COUNT: 7.73 K/UL (ref 1.5–8.1)
SEGMENTED NEUTROPHILS ABSOLUTE COUNT: 7.76 K/UL (ref 1.5–8.1)
SEGMENTED NEUTROPHILS ABSOLUTE COUNT: 7.87 K/UL (ref 1.5–8.1)
SEGMENTED NEUTROPHILS ABSOLUTE COUNT: 7.98 K/UL (ref 1.5–8.1)
SEGMENTED NEUTROPHILS ABSOLUTE COUNT: 9.06 K/UL (ref 1.5–8.1)
SEGMENTED NEUTROPHILS ABSOLUTE COUNT: 9.58 K/UL (ref 1.5–8.1)
SET RATE: ABNORMAL
SODIUM BLD-SCNC: 134 MMOL/L (ref 135–144)
SODIUM BLD-SCNC: 136 MMOL/L (ref 135–144)
SODIUM BLD-SCNC: 137 MMOL/L (ref 135–144)
SODIUM BLD-SCNC: 139 MMOL/L (ref 135–144)
SODIUM BLD-SCNC: 139 MMOL/L (ref 135–144)
SODIUM BLD-SCNC: 140 MMOL/L (ref 135–144)
SODIUM BLD-SCNC: 141 MMOL/L (ref 135–144)
SODIUM BLD-SCNC: 143 MMOL/L (ref 135–144)
SODIUM BLD-SCNC: 143 MMOL/L (ref 135–144)
SODIUM BLD-SCNC: 144 MMOL/L (ref 135–144)
SODIUM BLD-SCNC: 144 MMOL/L (ref 135–144)
SPECIFIC GRAVITY UA: 1.07 (ref 1–1.03)
SPECIMEN DESCRIPTION: NORMAL
TCO2 (CALC), ART: 21 MMOL/L (ref 22–29)
TCO2 (CALC), ART: 21 MMOL/L (ref 22–29)
TCO2 (CALC), ART: 22 MMOL/L (ref 22–29)
TCO2 (CALC), ART: 23 MMOL/L (ref 22–29)
TCO2 (CALC), ART: 23 MMOL/L (ref 22–29)
TCO2 (CALC), ART: 24 MMOL/L (ref 22–29)
TCO2 (CALC), ART: 27 MMOL/L (ref 22–29)
TEXT FOR RESPIRATORY: ABNORMAL
TOTAL CK: 247 U/L (ref 26–192)
TOTAL HB: ABNORMAL G/DL (ref 12–16)
TOTAL PROTEIN: 6.2 G/DL (ref 6.4–8.3)
TOTAL PROTEIN: 6.4 G/DL (ref 6.4–8.3)
TOTAL RATE: ABNORMAL
TRICHOMONAS: ABNORMAL
TRIGL SERPL-MCNC: 121 MG/DL
TRIGL SERPL-MCNC: 69 MG/DL
TROPONIN INTERP: ABNORMAL
TROPONIN T: ABNORMAL NG/ML
TROPONIN, HIGH SENSITIVITY: 100 NG/L (ref 0–14)
TROPONIN, HIGH SENSITIVITY: 149 NG/L (ref 0–14)
TROPONIN, HIGH SENSITIVITY: 168 NG/L (ref 0–14)
TROPONIN, HIGH SENSITIVITY: 77 NG/L (ref 0–14)
TURBIDITY: CLEAR
URINE HGB: ABNORMAL
UROBILINOGEN, URINE: NORMAL
VLDLC SERPL CALC-MCNC: NORMAL MG/DL (ref 1–30)
VT: ABNORMAL
WBC # BLD: 10.3 K/UL (ref 3.5–11.3)
WBC # BLD: 10.3 K/UL (ref 3.5–11.3)
WBC # BLD: 11.4 K/UL (ref 3.5–11.3)
WBC # BLD: 12.3 K/UL (ref 3.5–11.3)
WBC # BLD: 12.4 K/UL (ref 3.5–11.3)
WBC # BLD: 14.1 K/UL (ref 3.5–11.3)
WBC # BLD: 4.4 K/UL (ref 3.5–11.3)
WBC # BLD: 6.5 K/UL (ref 3.5–11.3)
WBC # BLD: 6.6 K/UL (ref 3.5–11.3)
WBC # BLD: 7.9 K/UL (ref 3.5–11.3)
WBC # BLD: 8.9 K/UL (ref 3.5–11.3)
WBC # BLD: 9.2 K/UL (ref 3.5–11.3)
WBC # BLD: 9.5 K/UL (ref 3.5–11.3)
WBC # BLD: 9.6 K/UL (ref 3.5–11.3)
WBC # BLD: 9.8 K/UL (ref 3.5–11.3)
WBC # BLD: 9.9 K/UL (ref 3.5–11.3)
WBC # BLD: ABNORMAL 10*3/UL
WBC UA: ABNORMAL /HPF (ref 0–5)
YEAST: ABNORMAL
ZZ NTE CLEAN UP: ORDERED TEST: NORMAL
ZZ NTE CLEAN UP: ORDERED TEST: NORMAL
ZZ NTE WITH NAME CLEAN UP: SPECIMEN SOURCE: NORMAL
ZZ NTE WITH NAME CLEAN UP: SPECIMEN SOURCE: NORMAL

## 2019-01-01 PROCEDURE — 83735 ASSAY OF MAGNESIUM: CPT

## 2019-01-01 PROCEDURE — 2580000003 HC RX 258: Performed by: STUDENT IN AN ORGANIZED HEALTH CARE EDUCATION/TRAINING PROGRAM

## 2019-01-01 PROCEDURE — 6370000000 HC RX 637 (ALT 250 FOR IP): Performed by: STUDENT IN AN ORGANIZED HEALTH CARE EDUCATION/TRAINING PROGRAM

## 2019-01-01 PROCEDURE — 2580000003 HC RX 258: Performed by: NURSE PRACTITIONER

## 2019-01-01 PROCEDURE — 94760 N-INVAS EAR/PLS OXIMETRY 1: CPT

## 2019-01-01 PROCEDURE — 36600 WITHDRAWAL OF ARTERIAL BLOOD: CPT

## 2019-01-01 PROCEDURE — 36415 COLL VENOUS BLD VENIPUNCTURE: CPT

## 2019-01-01 PROCEDURE — 2500000003 HC RX 250 WO HCPCS: Performed by: PSYCHIATRY & NEUROLOGY

## 2019-01-01 PROCEDURE — 1036F TOBACCO NON-USER: CPT | Performed by: INTERNAL MEDICINE

## 2019-01-01 PROCEDURE — 84145 PROCALCITONIN (PCT): CPT

## 2019-01-01 PROCEDURE — 71045 X-RAY EXAM CHEST 1 VIEW: CPT

## 2019-01-01 PROCEDURE — 94668 MNPJ CHEST WALL SBSQ: CPT

## 2019-01-01 PROCEDURE — 74018 RADEX ABDOMEN 1 VIEW: CPT

## 2019-01-01 PROCEDURE — 97530 THERAPEUTIC ACTIVITIES: CPT

## 2019-01-01 PROCEDURE — 82803 BLOOD GASES ANY COMBINATION: CPT

## 2019-01-01 PROCEDURE — 84484 ASSAY OF TROPONIN QUANT: CPT

## 2019-01-01 PROCEDURE — 84100 ASSAY OF PHOSPHORUS: CPT

## 2019-01-01 PROCEDURE — 80048 BASIC METABOLIC PNL TOTAL CA: CPT

## 2019-01-01 PROCEDURE — 97110 THERAPEUTIC EXERCISES: CPT

## 2019-01-01 PROCEDURE — 97535 SELF CARE MNGMENT TRAINING: CPT

## 2019-01-01 PROCEDURE — 2700000000 HC OXYGEN THERAPY PER DAY

## 2019-01-01 PROCEDURE — 85025 COMPLETE CBC W/AUTO DIFF WBC: CPT

## 2019-01-01 PROCEDURE — 3700000001 HC ADD 15 MINUTES (ANESTHESIA): Performed by: SURGERY

## 2019-01-01 PROCEDURE — 0BH18EZ INSERTION OF ENDOTRACHEAL AIRWAY INTO TRACHEA, VIA NATURAL OR ARTIFICIAL OPENING ENDOSCOPIC: ICD-10-PCS | Performed by: PSYCHIATRY & NEUROLOGY

## 2019-01-01 PROCEDURE — 84478 ASSAY OF TRIGLYCERIDES: CPT

## 2019-01-01 PROCEDURE — 6360000004 HC RX CONTRAST MEDICATION: Performed by: PSYCHIATRY & NEUROLOGY

## 2019-01-01 PROCEDURE — 6360000002 HC RX W HCPCS: Performed by: STUDENT IN AN ORGANIZED HEALTH CARE EDUCATION/TRAINING PROGRAM

## 2019-01-01 PROCEDURE — 85027 COMPLETE CBC AUTOMATED: CPT

## 2019-01-01 PROCEDURE — 94770 HC ETCO2 MONITOR DAILY: CPT

## 2019-01-01 PROCEDURE — 6360000002 HC RX W HCPCS: Performed by: FAMILY MEDICINE

## 2019-01-01 PROCEDURE — 2000000003 HC NEURO ICU R&B

## 2019-01-01 PROCEDURE — 87641 MR-STAPH DNA AMP PROBE: CPT

## 2019-01-01 PROCEDURE — 6370000000 HC RX 637 (ALT 250 FOR IP): Performed by: NURSE PRACTITIONER

## 2019-01-01 PROCEDURE — 6360000002 HC RX W HCPCS: Performed by: EMERGENCY MEDICINE

## 2019-01-01 PROCEDURE — 0DHA3UZ INSERTION OF FEEDING DEVICE INTO JEJUNUM, PERCUTANEOUS APPROACH: ICD-10-PCS | Performed by: SURGERY

## 2019-01-01 PROCEDURE — 2580000003 HC RX 258: Performed by: PSYCHIATRY & NEUROLOGY

## 2019-01-01 PROCEDURE — 99291 CRITICAL CARE FIRST HOUR: CPT | Performed by: PSYCHIATRY & NEUROLOGY

## 2019-01-01 PROCEDURE — 6360000002 HC RX W HCPCS: Performed by: NURSE PRACTITIONER

## 2019-01-01 PROCEDURE — 51798 US URINE CAPACITY MEASURE: CPT

## 2019-01-01 PROCEDURE — 99221 1ST HOSP IP/OBS SF/LOW 40: CPT | Performed by: PHYSICAL MEDICINE & REHABILITATION

## 2019-01-01 PROCEDURE — 99232 SBSQ HOSP IP/OBS MODERATE 35: CPT | Performed by: INTERNAL MEDICINE

## 2019-01-01 PROCEDURE — 95951 PR EEG MONITORING/VIDEORECORD: CPT | Performed by: PSYCHIATRY & NEUROLOGY

## 2019-01-01 PROCEDURE — 87040 BLOOD CULTURE FOR BACTERIA: CPT

## 2019-01-01 PROCEDURE — 5A1955Z RESPIRATORY VENTILATION, GREATER THAN 96 CONSECUTIVE HOURS: ICD-10-PCS | Performed by: PSYCHIATRY & NEUROLOGY

## 2019-01-01 PROCEDURE — 51701 INSERT BLADDER CATHETER: CPT

## 2019-01-01 PROCEDURE — 94003 VENT MGMT INPAT SUBQ DAY: CPT

## 2019-01-01 PROCEDURE — 2709999900 HC NON-CHARGEABLE SUPPLY

## 2019-01-01 PROCEDURE — 93880 EXTRACRANIAL BILAT STUDY: CPT

## 2019-01-01 PROCEDURE — 2580000003 HC RX 258: Performed by: ANESTHESIOLOGY

## 2019-01-01 PROCEDURE — 99221 1ST HOSP IP/OBS SF/LOW 40: CPT | Performed by: SURGERY

## 2019-01-01 PROCEDURE — 70450 CT HEAD/BRAIN W/O DYE: CPT

## 2019-01-01 PROCEDURE — 2060000000 HC ICU INTERMEDIATE R&B

## 2019-01-01 PROCEDURE — 82550 ASSAY OF CK (CPK): CPT

## 2019-01-01 PROCEDURE — 2500000003 HC RX 250 WO HCPCS: Performed by: SURGERY

## 2019-01-01 PROCEDURE — 6370000000 HC RX 637 (ALT 250 FOR IP): Performed by: EMERGENCY MEDICINE

## 2019-01-01 PROCEDURE — 84132 ASSAY OF SERUM POTASSIUM: CPT

## 2019-01-01 PROCEDURE — 2709999900 HC NON-CHARGEABLE SUPPLY: Performed by: SURGERY

## 2019-01-01 PROCEDURE — 94762 N-INVAS EAR/PLS OXIMTRY CONT: CPT

## 2019-01-01 PROCEDURE — 2720000010 HC SURG SUPPLY STERILE: Performed by: SURGERY

## 2019-01-01 PROCEDURE — 92611 MOTION FLUOROSCOPY/SWALLOW: CPT

## 2019-01-01 PROCEDURE — 99233 SBSQ HOSP IP/OBS HIGH 50: CPT | Performed by: PSYCHIATRY & NEUROLOGY

## 2019-01-01 PROCEDURE — 2500000003 HC RX 250 WO HCPCS

## 2019-01-01 PROCEDURE — 85055 RETICULATED PLATELET ASSAY: CPT

## 2019-01-01 PROCEDURE — 85049 AUTOMATED PLATELET COUNT: CPT

## 2019-01-01 PROCEDURE — 3430000000 HC RX DIAGNOSTIC RADIOPHARMACEUTICAL: Performed by: NURSE PRACTITIONER

## 2019-01-01 PROCEDURE — 92507 TX SP LANG VOICE COMM INDIV: CPT

## 2019-01-01 PROCEDURE — 80053 COMPREHEN METABOLIC PANEL: CPT

## 2019-01-01 PROCEDURE — 93005 ELECTROCARDIOGRAM TRACING: CPT

## 2019-01-01 PROCEDURE — 6360000002 HC RX W HCPCS

## 2019-01-01 PROCEDURE — 94002 VENT MGMT INPAT INIT DAY: CPT

## 2019-01-01 PROCEDURE — 6370000000 HC RX 637 (ALT 250 FOR IP): Performed by: PSYCHIATRY & NEUROLOGY

## 2019-01-01 PROCEDURE — 0DN84ZZ RELEASE SMALL INTESTINE, PERCUTANEOUS ENDOSCOPIC APPROACH: ICD-10-PCS | Performed by: SURGERY

## 2019-01-01 PROCEDURE — 1101F PT FALLS ASSESS-DOCD LE1/YR: CPT | Performed by: INTERNAL MEDICINE

## 2019-01-01 PROCEDURE — 2500000003 HC RX 250 WO HCPCS: Performed by: STUDENT IN AN ORGANIZED HEALTH CARE EDUCATION/TRAINING PROGRAM

## 2019-01-01 PROCEDURE — 2580000003 HC RX 258: Performed by: FAMILY MEDICINE

## 2019-01-01 PROCEDURE — 71260 CT THORAX DX C+: CPT

## 2019-01-01 PROCEDURE — 6370000000 HC RX 637 (ALT 250 FOR IP): Performed by: FAMILY MEDICINE

## 2019-01-01 PROCEDURE — 99223 1ST HOSP IP/OBS HIGH 75: CPT | Performed by: PSYCHIATRY & NEUROLOGY

## 2019-01-01 PROCEDURE — 97166 OT EVAL MOD COMPLEX 45 MIN: CPT

## 2019-01-01 PROCEDURE — 97161 PT EVAL LOW COMPLEX 20 MIN: CPT

## 2019-01-01 PROCEDURE — G8598 ASA/ANTIPLAT THER USED: HCPCS | Performed by: INTERNAL MEDICINE

## 2019-01-01 PROCEDURE — 93017 CV STRESS TEST TRACING ONLY: CPT | Performed by: NURSE PRACTITIONER

## 2019-01-01 PROCEDURE — 99232 SBSQ HOSP IP/OBS MODERATE 35: CPT | Performed by: FAMILY MEDICINE

## 2019-01-01 PROCEDURE — 3700000000 HC ANESTHESIA ATTENDED CARE: Performed by: SURGERY

## 2019-01-01 PROCEDURE — 74230 X-RAY XM SWLNG FUNCJ C+: CPT

## 2019-01-01 PROCEDURE — 80061 LIPID PANEL: CPT

## 2019-01-01 PROCEDURE — 94667 MNPJ CHEST WALL 1ST: CPT

## 2019-01-01 PROCEDURE — 6360000002 HC RX W HCPCS: Performed by: INTERNAL MEDICINE

## 2019-01-01 PROCEDURE — 7100000001 HC PACU RECOVERY - ADDTL 15 MIN: Performed by: SURGERY

## 2019-01-01 PROCEDURE — 92610 EVALUATE SWALLOWING FUNCTION: CPT

## 2019-01-01 PROCEDURE — 87205 SMEAR GRAM STAIN: CPT

## 2019-01-01 PROCEDURE — G8427 DOCREV CUR MEDS BY ELIG CLIN: HCPCS | Performed by: INTERNAL MEDICINE

## 2019-01-01 PROCEDURE — 2500000003 HC RX 250 WO HCPCS: Performed by: NURSE ANESTHETIST, CERTIFIED REGISTERED

## 2019-01-01 PROCEDURE — 99292 CRITICAL CARE ADDL 30 MIN: CPT | Performed by: PSYCHIATRY & NEUROLOGY

## 2019-01-01 PROCEDURE — 99213 OFFICE O/P EST LOW 20 MIN: CPT | Performed by: INTERNAL MEDICINE

## 2019-01-01 PROCEDURE — 97164 PT RE-EVAL EST PLAN CARE: CPT

## 2019-01-01 PROCEDURE — 99285 EMERGENCY DEPT VISIT HI MDM: CPT

## 2019-01-01 PROCEDURE — 1123F ACP DISCUSS/DSCN MKR DOCD: CPT | Performed by: INTERNAL MEDICINE

## 2019-01-01 PROCEDURE — 85730 THROMBOPLASTIN TIME PARTIAL: CPT

## 2019-01-01 PROCEDURE — 2580000003 HC RX 258: Performed by: EMERGENCY MEDICINE

## 2019-01-01 PROCEDURE — 94660 CPAP INITIATION&MGMT: CPT

## 2019-01-01 PROCEDURE — G8400 PT W/DXA NO RESULTS DOC: HCPCS | Performed by: INTERNAL MEDICINE

## 2019-01-01 PROCEDURE — 82947 ASSAY GLUCOSE BLOOD QUANT: CPT

## 2019-01-01 PROCEDURE — 31720 CLEARANCE OF AIRWAYS: CPT

## 2019-01-01 PROCEDURE — 76937 US GUIDE VASCULAR ACCESS: CPT

## 2019-01-01 PROCEDURE — 43752 NASAL/OROGASTRIC W/TUBE PLMT: CPT | Performed by: RADIOLOGY

## 2019-01-01 PROCEDURE — G8420 CALC BMI NORM PARAMETERS: HCPCS | Performed by: INTERNAL MEDICINE

## 2019-01-01 PROCEDURE — 6360000002 HC RX W HCPCS: Performed by: PSYCHIATRY & NEUROLOGY

## 2019-01-01 PROCEDURE — 92523 SPEECH SOUND LANG COMPREHEN: CPT

## 2019-01-01 PROCEDURE — 95951 HC EEG MONITORING VIDEO RECORDING: CPT

## 2019-01-01 PROCEDURE — 37799 UNLISTED PX VASCULAR SURGERY: CPT

## 2019-01-01 PROCEDURE — 44186 LAP JEJUNOSTOMY: CPT | Performed by: SURGERY

## 2019-01-01 PROCEDURE — 99232 SBSQ HOSP IP/OBS MODERATE 35: CPT | Performed by: PSYCHIATRY & NEUROLOGY

## 2019-01-01 PROCEDURE — 99239 HOSP IP/OBS DSCHRG MGMT >30: CPT | Performed by: INTERNAL MEDICINE

## 2019-01-01 PROCEDURE — 93306 TTE W/DOPPLER COMPLETE: CPT

## 2019-01-01 PROCEDURE — 6360000002 HC RX W HCPCS: Performed by: NURSE ANESTHETIST, CERTIFIED REGISTERED

## 2019-01-01 PROCEDURE — 1090F PRES/ABSN URINE INCON ASSESS: CPT | Performed by: INTERNAL MEDICINE

## 2019-01-01 PROCEDURE — 99024 POSTOP FOLLOW-UP VISIT: CPT | Performed by: SURGERY

## 2019-01-01 PROCEDURE — C1760 CLOSURE DEV, VASC: HCPCS | Performed by: SURGERY

## 2019-01-01 PROCEDURE — 94761 N-INVAS EAR/PLS OXIMETRY MLT: CPT

## 2019-01-01 PROCEDURE — 82330 ASSAY OF CALCIUM: CPT

## 2019-01-01 PROCEDURE — 31500 INSERT EMERGENCY AIRWAY: CPT

## 2019-01-01 PROCEDURE — 99223 1ST HOSP IP/OBS HIGH 75: CPT | Performed by: FAMILY MEDICINE

## 2019-01-01 PROCEDURE — G8482 FLU IMMUNIZE ORDER/ADMIN: HCPCS | Performed by: INTERNAL MEDICINE

## 2019-01-01 PROCEDURE — 78452 HT MUSCLE IMAGE SPECT MULT: CPT

## 2019-01-01 PROCEDURE — 82805 BLOOD GASES W/O2 SATURATION: CPT

## 2019-01-01 PROCEDURE — 3600000005 HC SURGERY LEVEL 5 BASE: Performed by: SURGERY

## 2019-01-01 PROCEDURE — 3600000015 HC SURGERY LEVEL 5 ADDTL 15MIN: Performed by: SURGERY

## 2019-01-01 PROCEDURE — 2500000003 HC RX 250 WO HCPCS: Performed by: FAMILY MEDICINE

## 2019-01-01 PROCEDURE — 36620 INSERTION CATHETER ARTERY: CPT

## 2019-01-01 PROCEDURE — 7100000000 HC PACU RECOVERY - FIRST 15 MIN: Performed by: SURGERY

## 2019-01-01 PROCEDURE — 3017F COLORECTAL CA SCREEN DOC REV: CPT | Performed by: INTERNAL MEDICINE

## 2019-01-01 PROCEDURE — 99211 OFF/OP EST MAY X REQ PHY/QHP: CPT

## 2019-01-01 PROCEDURE — 6360000004 HC RX CONTRAST MEDICATION: Performed by: NURSE PRACTITIONER

## 2019-01-01 PROCEDURE — 2580000003 HC RX 258: Performed by: SURGERY

## 2019-01-01 PROCEDURE — 83605 ASSAY OF LACTIC ACID: CPT

## 2019-01-01 PROCEDURE — 87070 CULTURE OTHR SPECIMN AEROBIC: CPT

## 2019-01-01 PROCEDURE — A9500 TC99M SESTAMIBI: HCPCS | Performed by: NURSE PRACTITIONER

## 2019-01-01 PROCEDURE — 87086 URINE CULTURE/COLONY COUNT: CPT

## 2019-01-01 PROCEDURE — 93289 INTERROG DEVICE EVAL HEART: CPT | Performed by: INTERNAL MEDICINE

## 2019-01-01 PROCEDURE — 83874 ASSAY OF MYOGLOBIN: CPT

## 2019-01-01 PROCEDURE — 82553 CREATINE MB FRACTION: CPT

## 2019-01-01 PROCEDURE — 81001 URINALYSIS AUTO W/SCOPE: CPT

## 2019-01-01 PROCEDURE — 4040F PNEUMOC VAC/ADMIN/RCVD: CPT | Performed by: INTERNAL MEDICINE

## 2019-01-01 PROCEDURE — 85610 PROTHROMBIN TIME: CPT

## 2019-01-01 RX ORDER — MAGNESIUM HYDROXIDE 1200 MG/15ML
LIQUID ORAL CONTINUOUS PRN
Status: COMPLETED | OUTPATIENT
Start: 2019-01-01 | End: 2019-01-01

## 2019-01-01 RX ORDER — SCOLOPAMINE TRANSDERMAL SYSTEM 1 MG/1
1 PATCH, EXTENDED RELEASE TRANSDERMAL
Status: DISCONTINUED | OUTPATIENT
Start: 2019-01-01 | End: 2019-01-01

## 2019-01-01 RX ORDER — CHLORHEXIDINE GLUCONATE 0.12 MG/ML
15 RINSE ORAL 2 TIMES DAILY
Status: DISCONTINUED | OUTPATIENT
Start: 2019-01-01 | End: 2019-01-01

## 2019-01-01 RX ORDER — SODIUM CHLORIDE, SODIUM LACTATE, POTASSIUM CHLORIDE, CALCIUM CHLORIDE 600; 310; 30; 20 MG/100ML; MG/100ML; MG/100ML; MG/100ML
INJECTION, SOLUTION INTRAVENOUS CONTINUOUS
Status: DISCONTINUED | OUTPATIENT
Start: 2019-01-01 | End: 2019-01-01

## 2019-01-01 RX ORDER — FUROSEMIDE 20 MG/1
20 TABLET ORAL DAILY
Qty: 60 TABLET | Refills: 3 | Status: SHIPPED | OUTPATIENT
Start: 2019-01-01

## 2019-01-01 RX ORDER — METOPROLOL TARTRATE 5 MG/5ML
2.5 INJECTION INTRAVENOUS PRN
Status: DISCONTINUED | OUTPATIENT
Start: 2019-01-01 | End: 2019-01-01

## 2019-01-01 RX ORDER — LIDOCAINE HYDROCHLORIDE 10 MG/ML
INJECTION, SOLUTION EPIDURAL; INFILTRATION; INTRACAUDAL; PERINEURAL PRN
Status: DISCONTINUED | OUTPATIENT
Start: 2019-01-01 | End: 2019-01-01 | Stop reason: SDUPTHER

## 2019-01-01 RX ORDER — SODIUM CHLORIDE 0.9 % (FLUSH) 0.9 %
10 SYRINGE (ML) INJECTION PRN
Status: DISCONTINUED | OUTPATIENT
Start: 2019-01-01 | End: 2019-01-01

## 2019-01-01 RX ORDER — CALCIUM GLUCONATE 94 MG/ML
1 INJECTION, SOLUTION INTRAVENOUS ONCE
Status: COMPLETED | OUTPATIENT
Start: 2019-01-01 | End: 2019-01-01

## 2019-01-01 RX ORDER — SENNA PLUS 8.6 MG/1
1 TABLET ORAL NIGHTLY
Status: DISCONTINUED | OUTPATIENT
Start: 2019-01-01 | End: 2019-01-01 | Stop reason: HOSPADM

## 2019-01-01 RX ORDER — DEXTROSE MONOHYDRATE 25 G/50ML
25 INJECTION, SOLUTION INTRAVENOUS PRN
Status: DISCONTINUED | OUTPATIENT
Start: 2019-01-01 | End: 2019-01-01 | Stop reason: HOSPADM

## 2019-01-01 RX ORDER — AMINOPHYLLINE DIHYDRATE 25 MG/ML
100 INJECTION, SOLUTION INTRAVENOUS
Status: DISCONTINUED | OUTPATIENT
Start: 2019-01-01 | End: 2019-01-01

## 2019-01-01 RX ORDER — FUROSEMIDE 10 MG/ML
20 INJECTION INTRAMUSCULAR; INTRAVENOUS ONCE
Status: COMPLETED | OUTPATIENT
Start: 2019-01-01 | End: 2019-01-01

## 2019-01-01 RX ORDER — ROCURONIUM BROMIDE 10 MG/ML
INJECTION, SOLUTION INTRAVENOUS PRN
Status: DISCONTINUED | OUTPATIENT
Start: 2019-01-01 | End: 2019-01-01 | Stop reason: SDUPTHER

## 2019-01-01 RX ORDER — ONDANSETRON 2 MG/ML
4 INJECTION INTRAMUSCULAR; INTRAVENOUS EVERY 6 HOURS PRN
Status: DISCONTINUED | OUTPATIENT
Start: 2019-01-01 | End: 2019-01-01 | Stop reason: HOSPADM

## 2019-01-01 RX ORDER — FUROSEMIDE 10 MG/ML
20 INJECTION INTRAMUSCULAR; INTRAVENOUS DAILY
Status: DISCONTINUED | OUTPATIENT
Start: 2019-01-01 | End: 2019-01-01

## 2019-01-01 RX ORDER — ATORVASTATIN CALCIUM 40 MG/1
40 TABLET, FILM COATED ORAL NIGHTLY
Status: DISCONTINUED | OUTPATIENT
Start: 2019-01-01 | End: 2019-01-01 | Stop reason: HOSPADM

## 2019-01-01 RX ORDER — MEPERIDINE HYDROCHLORIDE 50 MG/ML
12.5 INJECTION INTRAMUSCULAR; INTRAVENOUS; SUBCUTANEOUS EVERY 5 MIN PRN
Status: DISCONTINUED | OUTPATIENT
Start: 2019-01-01 | End: 2019-01-01

## 2019-01-01 RX ORDER — HEPARIN SODIUM 10000 [USP'U]/100ML
12 INJECTION, SOLUTION INTRAVENOUS CONTINUOUS
Status: DISCONTINUED | OUTPATIENT
Start: 2019-01-01 | End: 2019-01-01

## 2019-01-01 RX ORDER — POTASSIUM CHLORIDE 20MEQ/15ML
40 LIQUID (ML) ORAL DAILY
Status: DISCONTINUED | OUTPATIENT
Start: 2019-01-01 | End: 2019-01-01

## 2019-01-01 RX ORDER — FENTANYL CITRATE 50 UG/ML
25 INJECTION, SOLUTION INTRAMUSCULAR; INTRAVENOUS
Status: DISCONTINUED | OUTPATIENT
Start: 2019-01-01 | End: 2019-01-01 | Stop reason: HOSPADM

## 2019-01-01 RX ORDER — MIDODRINE HYDROCHLORIDE 5 MG/1
5 TABLET ORAL
Status: DISCONTINUED | OUTPATIENT
Start: 2019-01-01 | End: 2019-01-01

## 2019-01-01 RX ORDER — HEPARIN SODIUM 5000 [USP'U]/ML
5000 INJECTION, SOLUTION INTRAVENOUS; SUBCUTANEOUS EVERY 8 HOURS SCHEDULED
Status: DISCONTINUED | OUTPATIENT
Start: 2019-01-01 | End: 2019-01-01

## 2019-01-01 RX ORDER — ATORVASTATIN CALCIUM 40 MG/1
TABLET, FILM COATED ORAL
Qty: 30 TABLET | Refills: 11 | Status: SHIPPED | OUTPATIENT
Start: 2019-01-01

## 2019-01-01 RX ORDER — FUROSEMIDE 20 MG/1
20 TABLET ORAL DAILY
Status: DISCONTINUED | OUTPATIENT
Start: 2019-01-01 | End: 2019-01-01

## 2019-01-01 RX ORDER — ASPIRIN 81 MG/1
81 TABLET, CHEWABLE ORAL DAILY
Status: DISCONTINUED | OUTPATIENT
Start: 2019-01-01 | End: 2019-01-01 | Stop reason: HOSPADM

## 2019-01-01 RX ORDER — DIPHENHYDRAMINE HYDROCHLORIDE 50 MG/ML
12.5 INJECTION INTRAMUSCULAR; INTRAVENOUS
Status: DISCONTINUED | OUTPATIENT
Start: 2019-01-01 | End: 2019-01-01

## 2019-01-01 RX ORDER — ASPIRIN 81 MG/1
81 TABLET ORAL DAILY
Status: DISCONTINUED | OUTPATIENT
Start: 2019-01-01 | End: 2019-01-01

## 2019-01-01 RX ORDER — PROPOFOL 10 MG/ML
INJECTION, EMULSION INTRAVENOUS
Status: COMPLETED
Start: 2019-01-01 | End: 2019-01-01

## 2019-01-01 RX ORDER — OXYCODONE HYDROCHLORIDE AND ACETAMINOPHEN 5; 325 MG/1; MG/1
2 TABLET ORAL PRN
Status: DISCONTINUED | OUTPATIENT
Start: 2019-01-01 | End: 2019-01-01

## 2019-01-01 RX ORDER — PROPOFOL 10 MG/ML
INJECTION, EMULSION INTRAVENOUS PRN
Status: DISCONTINUED | OUTPATIENT
Start: 2019-01-01 | End: 2019-01-01 | Stop reason: SDUPTHER

## 2019-01-01 RX ORDER — MORPHINE SULFATE 2 MG/ML
2 INJECTION, SOLUTION INTRAMUSCULAR; INTRAVENOUS EVERY 5 MIN PRN
Status: DISCONTINUED | OUTPATIENT
Start: 2019-01-01 | End: 2019-01-01

## 2019-01-01 RX ORDER — ASPIRIN 300 MG/1
300 SUPPOSITORY RECTAL ONCE
Status: COMPLETED | OUTPATIENT
Start: 2019-01-01 | End: 2019-01-01

## 2019-01-01 RX ORDER — SODIUM CHLORIDE 0.9 % (FLUSH) 0.9 %
10 SYRINGE (ML) INJECTION PRN
Status: DISCONTINUED | OUTPATIENT
Start: 2019-01-01 | End: 2019-01-01 | Stop reason: HOSPADM

## 2019-01-01 RX ORDER — NITROGLYCERIN 0.4 MG/1
0.4 TABLET SUBLINGUAL EVERY 5 MIN PRN
Status: DISCONTINUED | OUTPATIENT
Start: 2019-01-01 | End: 2019-01-01

## 2019-01-01 RX ORDER — PROPOFOL 10 MG/ML
10 INJECTION, EMULSION INTRAVENOUS
Status: DISCONTINUED | OUTPATIENT
Start: 2019-01-01 | End: 2019-01-01

## 2019-01-01 RX ORDER — POTASSIUM CHLORIDE 7.45 MG/ML
10 INJECTION INTRAVENOUS PRN
Status: DISCONTINUED | OUTPATIENT
Start: 2019-01-01 | End: 2019-01-01 | Stop reason: HOSPADM

## 2019-01-01 RX ORDER — SODIUM CHLORIDE 0.9 % (FLUSH) 0.9 %
10 SYRINGE (ML) INJECTION EVERY 12 HOURS SCHEDULED
Status: DISCONTINUED | OUTPATIENT
Start: 2019-01-01 | End: 2019-01-01 | Stop reason: HOSPADM

## 2019-01-01 RX ORDER — FUROSEMIDE 20 MG/1
20 TABLET ORAL DAILY
Status: DISCONTINUED | OUTPATIENT
Start: 2019-01-01 | End: 2019-01-01 | Stop reason: HOSPADM

## 2019-01-01 RX ORDER — HEPARIN SODIUM 1000 [USP'U]/ML
60 INJECTION, SOLUTION INTRAVENOUS; SUBCUTANEOUS ONCE
Status: DISCONTINUED | OUTPATIENT
Start: 2019-01-01 | End: 2019-01-01

## 2019-01-01 RX ORDER — ONDANSETRON 2 MG/ML
INJECTION INTRAMUSCULAR; INTRAVENOUS PRN
Status: DISCONTINUED | OUTPATIENT
Start: 2019-01-01 | End: 2019-01-01 | Stop reason: SDUPTHER

## 2019-01-01 RX ORDER — SODIUM CHLORIDE 9 MG/ML
INJECTION, SOLUTION INTRAVENOUS CONTINUOUS
Status: DISCONTINUED | OUTPATIENT
Start: 2019-01-01 | End: 2019-01-01

## 2019-01-01 RX ORDER — ACETAMINOPHEN 650 MG/1
650 SUPPOSITORY RECTAL EVERY 4 HOURS PRN
Status: DISCONTINUED | OUTPATIENT
Start: 2019-01-01 | End: 2019-01-01 | Stop reason: HOSPADM

## 2019-01-01 RX ORDER — 0.9 % SODIUM CHLORIDE 0.9 %
500 INTRAVENOUS SOLUTION INTRAVENOUS ONCE
Status: COMPLETED | OUTPATIENT
Start: 2019-01-01 | End: 2019-01-01

## 2019-01-01 RX ORDER — SODIUM CHLORIDE 9 MG/ML
INJECTION, SOLUTION INTRAVENOUS ONCE
Status: DISCONTINUED | OUTPATIENT
Start: 2019-01-01 | End: 2019-01-01

## 2019-01-01 RX ORDER — HYDRALAZINE HYDROCHLORIDE 20 MG/ML
5 INJECTION INTRAMUSCULAR; INTRAVENOUS EVERY 10 MIN PRN
Status: DISCONTINUED | OUTPATIENT
Start: 2019-01-01 | End: 2019-01-01

## 2019-01-01 RX ORDER — GLYCOPYRROLATE 1 MG/5 ML
SYRINGE (ML) INTRAVENOUS PRN
Status: DISCONTINUED | OUTPATIENT
Start: 2019-01-01 | End: 2019-01-01 | Stop reason: SDUPTHER

## 2019-01-01 RX ORDER — BUPIVACAINE HYDROCHLORIDE AND EPINEPHRINE 5; 5 MG/ML; UG/ML
INJECTION, SOLUTION EPIDURAL; INTRACAUDAL; PERINEURAL PRN
Status: DISCONTINUED | OUTPATIENT
Start: 2019-01-01 | End: 2019-01-01 | Stop reason: ALTCHOICE

## 2019-01-01 RX ORDER — ONDANSETRON 2 MG/ML
4 INJECTION INTRAMUSCULAR; INTRAVENOUS
Status: DISCONTINUED | OUTPATIENT
Start: 2019-01-01 | End: 2019-01-01

## 2019-01-01 RX ORDER — FENTANYL CITRATE 50 UG/ML
INJECTION, SOLUTION INTRAMUSCULAR; INTRAVENOUS PRN
Status: DISCONTINUED | OUTPATIENT
Start: 2019-01-01 | End: 2019-01-01 | Stop reason: SDUPTHER

## 2019-01-01 RX ORDER — HEPARIN SODIUM 1000 [USP'U]/ML
60 INJECTION, SOLUTION INTRAVENOUS; SUBCUTANEOUS PRN
Status: DISCONTINUED | OUTPATIENT
Start: 2019-01-01 | End: 2019-01-01

## 2019-01-01 RX ORDER — CEFAZOLIN SODIUM 1 G/3ML
INJECTION, POWDER, FOR SOLUTION INTRAMUSCULAR; INTRAVENOUS PRN
Status: DISCONTINUED | OUTPATIENT
Start: 2019-01-01 | End: 2019-01-01 | Stop reason: SDUPTHER

## 2019-01-01 RX ORDER — DOBUTAMINE HYDROCHLORIDE 400 MG/100ML
2.5 INJECTION INTRAVENOUS CONTINUOUS
Status: DISCONTINUED | OUTPATIENT
Start: 2019-01-01 | End: 2019-01-01

## 2019-01-01 RX ORDER — MIDODRINE HYDROCHLORIDE 5 MG/1
10 TABLET ORAL
Status: DISCONTINUED | OUTPATIENT
Start: 2019-01-01 | End: 2019-01-01

## 2019-01-01 RX ORDER — MIDODRINE HYDROCHLORIDE 5 MG/1
5 TABLET ORAL 3 TIMES DAILY PRN
Status: DISCONTINUED | OUTPATIENT
Start: 2019-01-01 | End: 2019-01-01 | Stop reason: HOSPADM

## 2019-01-01 RX ORDER — MAGNESIUM SULFATE 1 G/100ML
2 INJECTION INTRAVENOUS ONCE
Status: COMPLETED | OUTPATIENT
Start: 2019-01-01 | End: 2019-01-01

## 2019-01-01 RX ORDER — POTASSIUM CHLORIDE 20MEQ/15ML
20 LIQUID (ML) ORAL 2 TIMES DAILY
Status: DISPENSED | OUTPATIENT
Start: 2019-01-01 | End: 2019-01-01

## 2019-01-01 RX ORDER — 0.9 % SODIUM CHLORIDE 0.9 %
1000 INTRAVENOUS SOLUTION INTRAVENOUS ONCE
Status: COMPLETED | OUTPATIENT
Start: 2019-01-01 | End: 2019-01-01

## 2019-01-01 RX ORDER — FENTANYL CITRATE 50 UG/ML
25 INJECTION, SOLUTION INTRAMUSCULAR; INTRAVENOUS EVERY 5 MIN PRN
Status: DISCONTINUED | OUTPATIENT
Start: 2019-01-01 | End: 2019-01-01

## 2019-01-01 RX ORDER — GLYCOPYRROLATE 0.2 MG/ML
0.1 INJECTION INTRAMUSCULAR; INTRAVENOUS ONCE
Status: COMPLETED | OUTPATIENT
Start: 2019-01-01 | End: 2019-01-01

## 2019-01-01 RX ORDER — POTASSIUM CHLORIDE 20MEQ/15ML
20 LIQUID (ML) ORAL DAILY
Status: DISCONTINUED | OUTPATIENT
Start: 2019-01-01 | End: 2019-01-01

## 2019-01-01 RX ORDER — NEOSTIGMINE METHYLSULFATE 5 MG/5 ML
SYRINGE (ML) INTRAVENOUS PRN
Status: DISCONTINUED | OUTPATIENT
Start: 2019-01-01 | End: 2019-01-01 | Stop reason: SDUPTHER

## 2019-01-01 RX ORDER — OXYCODONE HYDROCHLORIDE AND ACETAMINOPHEN 5; 325 MG/1; MG/1
1 TABLET ORAL PRN
Status: DISCONTINUED | OUTPATIENT
Start: 2019-01-01 | End: 2019-01-01

## 2019-01-01 RX ORDER — HYDRALAZINE HYDROCHLORIDE 20 MG/ML
5 INJECTION INTRAMUSCULAR; INTRAVENOUS EVERY 6 HOURS PRN
Status: DISCONTINUED | OUTPATIENT
Start: 2019-01-01 | End: 2019-01-01 | Stop reason: HOSPADM

## 2019-01-01 RX ORDER — FENTANYL CITRATE 50 UG/ML
50 INJECTION, SOLUTION INTRAMUSCULAR; INTRAVENOUS EVERY 5 MIN PRN
Status: DISCONTINUED | OUTPATIENT
Start: 2019-01-01 | End: 2019-01-01

## 2019-01-01 RX ORDER — DIPHENOXYLATE HCL/ATROPINE 2.5-.025/5
5 LIQUID (ML) ORAL 4 TIMES DAILY PRN
Status: DISCONTINUED | OUTPATIENT
Start: 2019-01-01 | End: 2019-01-01 | Stop reason: HOSPADM

## 2019-01-01 RX ORDER — HEPARIN SODIUM 1000 [USP'U]/ML
30 INJECTION, SOLUTION INTRAVENOUS; SUBCUTANEOUS PRN
Status: DISCONTINUED | OUTPATIENT
Start: 2019-01-01 | End: 2019-01-01

## 2019-01-01 RX ADMIN — PIPERACILLIN AND TAZOBACTAM 3.38 G: 3; .375 INJECTION, POWDER, LYOPHILIZED, FOR SOLUTION INTRAVENOUS; PARENTERAL at 05:57

## 2019-01-01 RX ADMIN — Medication 10 ML: at 20:36

## 2019-01-01 RX ADMIN — AZITHROMYCIN DIHYDRATE 500 MG: 500 INJECTION, POWDER, LYOPHILIZED, FOR SOLUTION INTRAVENOUS at 13:54

## 2019-01-01 RX ADMIN — AZITHROMYCIN DIHYDRATE 500 MG: 500 INJECTION, POWDER, LYOPHILIZED, FOR SOLUTION INTRAVENOUS at 12:20

## 2019-01-01 RX ADMIN — SENNOSIDES 8.6 MG: 8.6 TABLET, FILM COATED ORAL at 20:34

## 2019-01-01 RX ADMIN — Medication 10 ML: at 08:49

## 2019-01-01 RX ADMIN — PROPOFOL 10 MCG/KG/MIN: 10 INJECTION, EMULSION INTRAVENOUS at 19:46

## 2019-01-01 RX ADMIN — SODIUM CHLORIDE, POTASSIUM CHLORIDE, SODIUM LACTATE AND CALCIUM CHLORIDE: 600; 310; 30; 20 INJECTION, SOLUTION INTRAVENOUS at 11:01

## 2019-01-01 RX ADMIN — SODIUM CHLORIDE, PRESERVATIVE FREE 10 ML: 5 INJECTION INTRAVENOUS at 08:05

## 2019-01-01 RX ADMIN — PIPERACILLIN AND TAZOBACTAM 3.38 G: 3; .375 INJECTION, POWDER, LYOPHILIZED, FOR SOLUTION INTRAVENOUS; PARENTERAL at 23:27

## 2019-01-01 RX ADMIN — FUROSEMIDE 20 MG: 20 TABLET ORAL at 10:03

## 2019-01-01 RX ADMIN — FUROSEMIDE 20 MG: 20 TABLET ORAL at 09:53

## 2019-01-01 RX ADMIN — SODIUM CHLORIDE: 9 INJECTION, SOLUTION INTRAVENOUS at 04:27

## 2019-01-01 RX ADMIN — POTASSIUM CHLORIDE 10 MEQ: 7.46 INJECTION, SOLUTION INTRAVENOUS at 09:59

## 2019-01-01 RX ADMIN — RIVAROXABAN 20 MG: 20 TABLET, FILM COATED ORAL at 18:18

## 2019-01-01 RX ADMIN — PIPERACILLIN AND TAZOBACTAM 3.38 G: 3; .375 INJECTION, POWDER, LYOPHILIZED, FOR SOLUTION INTRAVENOUS; PARENTERAL at 07:59

## 2019-01-01 RX ADMIN — Medication 0.6 MG: at 12:40

## 2019-01-01 RX ADMIN — PHENYLEPHRINE HYDROCHLORIDE 200 MCG: 10 INJECTION INTRAVENOUS at 11:36

## 2019-01-01 RX ADMIN — PIPERACILLIN AND TAZOBACTAM 3.38 G: 3; .375 INJECTION, POWDER, LYOPHILIZED, FOR SOLUTION INTRAVENOUS; PARENTERAL at 15:09

## 2019-01-01 RX ADMIN — HYDROCORTISONE SODIUM SUCCINATE 100 MG: 100 INJECTION, POWDER, FOR SOLUTION INTRAMUSCULAR; INTRAVENOUS at 14:27

## 2019-01-01 RX ADMIN — FAMOTIDINE 20 MG: 10 INJECTION, SOLUTION INTRAVENOUS at 09:26

## 2019-01-01 RX ADMIN — PROPOFOL 10 MCG/KG/MIN: 10 INJECTION, EMULSION INTRAVENOUS at 20:54

## 2019-01-01 RX ADMIN — POTASSIUM & SODIUM PHOSPHATES POWDER PACK 280-160-250 MG 250 MG: 280-160-250 PACK at 08:16

## 2019-01-01 RX ADMIN — Medication 10 ML: at 08:14

## 2019-01-01 RX ADMIN — HEPARIN SODIUM 5000 UNITS: 5000 INJECTION INTRAVENOUS; SUBCUTANEOUS at 21:03

## 2019-01-01 RX ADMIN — ASPIRIN 81 MG: 81 TABLET ORAL at 10:03

## 2019-01-01 RX ADMIN — FUROSEMIDE 20 MG: 10 INJECTION, SOLUTION INTRAMUSCULAR; INTRAVENOUS at 14:23

## 2019-01-01 RX ADMIN — MIDODRINE HYDROCHLORIDE 10 MG: 5 TABLET ORAL at 12:00

## 2019-01-01 RX ADMIN — PIPERACILLIN AND TAZOBACTAM 3.38 G: 3; .375 INJECTION, POWDER, LYOPHILIZED, FOR SOLUTION INTRAVENOUS; PARENTERAL at 22:39

## 2019-01-01 RX ADMIN — DESMOPRESSIN ACETATE 40 MG: 0.2 TABLET ORAL at 20:59

## 2019-01-01 RX ADMIN — MIDODRINE HYDROCHLORIDE 10 MG: 5 TABLET ORAL at 10:48

## 2019-01-01 RX ADMIN — MIDODRINE HYDROCHLORIDE 5 MG: 5 TABLET ORAL at 17:20

## 2019-01-01 RX ADMIN — FUROSEMIDE 20 MG: 20 TABLET ORAL at 15:40

## 2019-01-01 RX ADMIN — POTASSIUM CHLORIDE 10 MEQ: 7.46 INJECTION, SOLUTION INTRAVENOUS at 10:49

## 2019-01-01 RX ADMIN — HYDROCORTISONE SODIUM SUCCINATE 100 MG: 100 INJECTION, POWDER, FOR SOLUTION INTRAMUSCULAR; INTRAVENOUS at 20:36

## 2019-01-01 RX ADMIN — FENTANYL CITRATE 25 MCG: 50 INJECTION INTRAMUSCULAR; INTRAVENOUS at 05:34

## 2019-01-01 RX ADMIN — PIPERACILLIN AND TAZOBACTAM 3.38 G: 3; .375 INJECTION, POWDER, LYOPHILIZED, FOR SOLUTION INTRAVENOUS; PARENTERAL at 01:30

## 2019-01-01 RX ADMIN — DESMOPRESSIN ACETATE 40 MG: 0.2 TABLET ORAL at 20:33

## 2019-01-01 RX ADMIN — HEPARIN SODIUM 5000 UNITS: 5000 INJECTION INTRAVENOUS; SUBCUTANEOUS at 14:06

## 2019-01-01 RX ADMIN — FAMOTIDINE 20 MG: 10 INJECTION, SOLUTION INTRAVENOUS at 08:15

## 2019-01-01 RX ADMIN — PIPERACILLIN AND TAZOBACTAM 3.38 G: 3; .375 INJECTION, POWDER, LYOPHILIZED, FOR SOLUTION INTRAVENOUS; PARENTERAL at 23:24

## 2019-01-01 RX ADMIN — HEPARIN SODIUM 5000 UNITS: 5000 INJECTION INTRAVENOUS; SUBCUTANEOUS at 05:57

## 2019-01-01 RX ADMIN — SENNOSIDES 8.6 MG: 8.6 TABLET, FILM COATED ORAL at 23:00

## 2019-01-01 RX ADMIN — FAMOTIDINE 20 MG: 10 INJECTION, SOLUTION INTRAVENOUS at 08:16

## 2019-01-01 RX ADMIN — SODIUM PHOSPHATE, MONOBASIC, MONOHYDRATE 10 MMOL: 276; 142 INJECTION, SOLUTION INTRAVENOUS at 15:06

## 2019-01-01 RX ADMIN — DESMOPRESSIN ACETATE 40 MG: 0.2 TABLET ORAL at 22:25

## 2019-01-01 RX ADMIN — PIPERACILLIN AND TAZOBACTAM 3.38 G: 3; .375 INJECTION, POWDER, LYOPHILIZED, FOR SOLUTION INTRAVENOUS; PARENTERAL at 17:42

## 2019-01-01 RX ADMIN — PHENYLEPHRINE HYDROCHLORIDE 200 MCG: 10 INJECTION INTRAVENOUS at 12:30

## 2019-01-01 RX ADMIN — POTASSIUM & SODIUM PHOSPHATES POWDER PACK 280-160-250 MG 250 MG: 280-160-250 PACK at 21:16

## 2019-01-01 RX ADMIN — DIPHENOXYLATE HYDROCHLORIDE AND ATROPINE SULFATE 5 ML: 2.5; .025 SOLUTION ORAL at 20:59

## 2019-01-01 RX ADMIN — Medication 10 ML: at 08:17

## 2019-01-01 RX ADMIN — ASPIRIN 81 MG: 81 TABLET ORAL at 08:15

## 2019-01-01 RX ADMIN — IOVERSOL 75 ML: 741 INJECTION INTRA-ARTERIAL; INTRAVENOUS at 11:52

## 2019-01-01 RX ADMIN — PIPERACILLIN AND TAZOBACTAM 3.38 G: 3; .375 INJECTION, POWDER, LYOPHILIZED, FOR SOLUTION INTRAVENOUS; PARENTERAL at 23:45

## 2019-01-01 RX ADMIN — POTASSIUM CHLORIDE 40 MEQ: 40 SOLUTION ORAL at 08:49

## 2019-01-01 RX ADMIN — POTASSIUM & SODIUM PHOSPHATES POWDER PACK 280-160-250 MG 250 MG: 280-160-250 PACK at 12:00

## 2019-01-01 RX ADMIN — POTASSIUM CHLORIDE 10 MEQ: 7.46 INJECTION, SOLUTION INTRAVENOUS at 16:04

## 2019-01-01 RX ADMIN — PIPERACILLIN AND TAZOBACTAM 3.38 G: 3; .375 INJECTION, POWDER, LYOPHILIZED, FOR SOLUTION INTRAVENOUS; PARENTERAL at 15:00

## 2019-01-01 RX ADMIN — ASPIRIN 81 MG: 81 TABLET ORAL at 09:53

## 2019-01-01 RX ADMIN — MIDODRINE HYDROCHLORIDE 10 MG: 5 TABLET ORAL at 17:56

## 2019-01-01 RX ADMIN — POTASSIUM & SODIUM PHOSPHATES POWDER PACK 280-160-250 MG 250 MG: 280-160-250 PACK at 14:30

## 2019-01-01 RX ADMIN — ASPIRIN 81 MG: 81 TABLET ORAL at 10:36

## 2019-01-01 RX ADMIN — SODIUM CHLORIDE 500 ML: 9 INJECTION, SOLUTION INTRAVENOUS at 05:26

## 2019-01-01 RX ADMIN — HEPARIN SODIUM 5000 UNITS: 5000 INJECTION INTRAVENOUS; SUBCUTANEOUS at 22:39

## 2019-01-01 RX ADMIN — LIDOCAINE HYDROCHLORIDE 50 MG: 10 INJECTION, SOLUTION EPIDURAL; INFILTRATION; INTRACAUDAL; PERINEURAL at 11:23

## 2019-01-01 RX ADMIN — SENNOSIDES 8.6 MG: 8.6 TABLET, FILM COATED ORAL at 22:25

## 2019-01-01 RX ADMIN — POTASSIUM CHLORIDE 10 MEQ: 7.46 INJECTION, SOLUTION INTRAVENOUS at 11:00

## 2019-01-01 RX ADMIN — HEPARIN SODIUM 5000 UNITS: 5000 INJECTION INTRAVENOUS; SUBCUTANEOUS at 06:45

## 2019-01-01 RX ADMIN — Medication 10 ML: at 09:00

## 2019-01-01 RX ADMIN — POTASSIUM & SODIUM PHOSPHATES POWDER PACK 280-160-250 MG 250 MG: 280-160-250 PACK at 13:00

## 2019-01-01 RX ADMIN — POTASSIUM CHLORIDE 10 MEQ: 7.46 INJECTION, SOLUTION INTRAVENOUS at 12:20

## 2019-01-01 RX ADMIN — Medication 2 MCG/MIN: at 06:48

## 2019-01-01 RX ADMIN — AZITHROMYCIN DIHYDRATE 500 MG: 500 INJECTION, POWDER, LYOPHILIZED, FOR SOLUTION INTRAVENOUS at 14:06

## 2019-01-01 RX ADMIN — POTASSIUM CHLORIDE 10 MEQ: 7.46 INJECTION, SOLUTION INTRAVENOUS at 13:20

## 2019-01-01 RX ADMIN — FENTANYL CITRATE 25 MCG: 50 INJECTION INTRAMUSCULAR; INTRAVENOUS at 03:01

## 2019-01-01 RX ADMIN — AZITHROMYCIN DIHYDRATE 500 MG: 500 INJECTION, POWDER, LYOPHILIZED, FOR SOLUTION INTRAVENOUS at 10:35

## 2019-01-01 RX ADMIN — Medication 10 ML: at 10:04

## 2019-01-01 RX ADMIN — HYDROCORTISONE SODIUM SUCCINATE 100 MG: 100 INJECTION, POWDER, FOR SOLUTION INTRAMUSCULAR; INTRAVENOUS at 21:03

## 2019-01-01 RX ADMIN — POTASSIUM CHLORIDE 20 MEQ: 40 SOLUTION ORAL at 21:16

## 2019-01-01 RX ADMIN — DESMOPRESSIN ACETATE 40 MG: 0.2 TABLET ORAL at 20:34

## 2019-01-01 RX ADMIN — CEFAZOLIN 1000 MG: 1 INJECTION, POWDER, FOR SOLUTION INTRAMUSCULAR; INTRAVENOUS at 11:38

## 2019-01-01 RX ADMIN — POTASSIUM CHLORIDE 10 MEQ: 7.46 INJECTION, SOLUTION INTRAVENOUS at 21:56

## 2019-01-01 RX ADMIN — ASPIRIN 81 MG: 81 TABLET ORAL at 09:46

## 2019-01-01 RX ADMIN — ASPIRIN 81 MG: 81 TABLET ORAL at 08:35

## 2019-01-01 RX ADMIN — PHENYLEPHRINE HYDROCHLORIDE 200 MCG: 10 INJECTION INTRAVENOUS at 12:18

## 2019-01-01 RX ADMIN — PROPOFOL 130 MG: 10 INJECTION, EMULSION INTRAVENOUS at 11:23

## 2019-01-01 RX ADMIN — HEPARIN SODIUM AND DEXTROSE 12 UNITS/KG/HR: 10000; 5 INJECTION INTRAVENOUS at 09:55

## 2019-01-01 RX ADMIN — HYDROCORTISONE SODIUM SUCCINATE 100 MG: 100 INJECTION, POWDER, FOR SOLUTION INTRAMUSCULAR; INTRAVENOUS at 05:08

## 2019-01-01 RX ADMIN — DESMOPRESSIN ACETATE 40 MG: 0.2 TABLET ORAL at 21:02

## 2019-01-01 RX ADMIN — MIDODRINE HYDROCHLORIDE 10 MG: 5 TABLET ORAL at 18:00

## 2019-01-01 RX ADMIN — RIVAROXABAN 20 MG: 20 TABLET, FILM COATED ORAL at 18:35

## 2019-01-01 RX ADMIN — CALCIUM GLUCONATE 1 G: 98 INJECTION, SOLUTION INTRAVENOUS at 12:00

## 2019-01-01 RX ADMIN — PIPERACILLIN AND TAZOBACTAM 3.38 G: 3; .375 INJECTION, POWDER, LYOPHILIZED, FOR SOLUTION INTRAVENOUS; PARENTERAL at 08:51

## 2019-01-01 RX ADMIN — AZITHROMYCIN DIHYDRATE 500 MG: 500 INJECTION, POWDER, LYOPHILIZED, FOR SOLUTION INTRAVENOUS at 12:00

## 2019-01-01 RX ADMIN — Medication 10 ML: at 10:36

## 2019-01-01 RX ADMIN — HEPARIN SODIUM 5000 UNITS: 5000 INJECTION INTRAVENOUS; SUBCUTANEOUS at 21:01

## 2019-01-01 RX ADMIN — HYDROCORTISONE SODIUM SUCCINATE 100 MG: 100 INJECTION, POWDER, FOR SOLUTION INTRAMUSCULAR; INTRAVENOUS at 11:57

## 2019-01-01 RX ADMIN — PIPERACILLIN AND TAZOBACTAM 3.38 G: 3; .375 INJECTION, POWDER, LYOPHILIZED, FOR SOLUTION INTRAVENOUS; PARENTERAL at 06:46

## 2019-01-01 RX ADMIN — POTASSIUM & SODIUM PHOSPHATES POWDER PACK 280-160-250 MG 250 MG: 280-160-250 PACK at 18:02

## 2019-01-01 RX ADMIN — RIVAROXABAN 20 MG: 20 TABLET, FILM COATED ORAL at 18:44

## 2019-01-01 RX ADMIN — POTASSIUM CHLORIDE 40 MEQ: 40 SOLUTION ORAL at 09:26

## 2019-01-01 RX ADMIN — ONDANSETRON 4 MG: 2 INJECTION, SOLUTION INTRAMUSCULAR; INTRAVENOUS at 12:36

## 2019-01-01 RX ADMIN — SODIUM CHLORIDE: 9 INJECTION, SOLUTION INTRAVENOUS at 22:35

## 2019-01-01 RX ADMIN — POTASSIUM CHLORIDE 40 MEQ: 40 SOLUTION ORAL at 09:07

## 2019-01-01 RX ADMIN — Medication 3 MG: at 12:40

## 2019-01-01 RX ADMIN — SODIUM CHLORIDE, POTASSIUM CHLORIDE, SODIUM LACTATE AND CALCIUM CHLORIDE: 600; 310; 30; 20 INJECTION, SOLUTION INTRAVENOUS at 12:52

## 2019-01-01 RX ADMIN — FUROSEMIDE 20 MG: 10 INJECTION, SOLUTION INTRAMUSCULAR; INTRAVENOUS at 15:06

## 2019-01-01 RX ADMIN — PIPERACILLIN AND TAZOBACTAM 3.38 G: 3; .375 INJECTION, POWDER, LYOPHILIZED, FOR SOLUTION INTRAVENOUS; PARENTERAL at 08:12

## 2019-01-01 RX ADMIN — MIDODRINE HYDROCHLORIDE 10 MG: 5 TABLET ORAL at 10:36

## 2019-01-01 RX ADMIN — SODIUM CHLORIDE: 9 INJECTION, SOLUTION INTRAVENOUS at 04:45

## 2019-01-01 RX ADMIN — HEPARIN SODIUM 5000 UNITS: 5000 INJECTION INTRAVENOUS; SUBCUTANEOUS at 14:00

## 2019-01-01 RX ADMIN — POTASSIUM & SODIUM PHOSPHATES POWDER PACK 280-160-250 MG 250 MG: 280-160-250 PACK at 09:00

## 2019-01-01 RX ADMIN — FUROSEMIDE 20 MG: 20 TABLET ORAL at 09:46

## 2019-01-01 RX ADMIN — FUROSEMIDE 20 MG: 10 INJECTION, SOLUTION INTRAMUSCULAR; INTRAVENOUS at 10:48

## 2019-01-01 RX ADMIN — PHENYLEPHRINE HYDROCHLORIDE 200 MCG: 10 INJECTION INTRAVENOUS at 12:00

## 2019-01-01 RX ADMIN — DEXTROSE MONOHYDRATE 25 G: 500 INJECTION PARENTERAL at 12:20

## 2019-01-01 RX ADMIN — Medication 10 ML: at 04:42

## 2019-01-01 RX ADMIN — HYDROCORTISONE SODIUM SUCCINATE 100 MG: 100 INJECTION, POWDER, FOR SOLUTION INTRAMUSCULAR; INTRAVENOUS at 21:02

## 2019-01-01 RX ADMIN — Medication 10 ML: at 21:05

## 2019-01-01 RX ADMIN — HEPARIN SODIUM 5000 UNITS: 5000 INJECTION INTRAVENOUS; SUBCUTANEOUS at 21:16

## 2019-01-01 RX ADMIN — HEPARIN SODIUM 5000 UNITS: 5000 INJECTION INTRAVENOUS; SUBCUTANEOUS at 14:33

## 2019-01-01 RX ADMIN — PIPERACILLIN AND TAZOBACTAM 3.38 G: 3; .375 INJECTION, POWDER, LYOPHILIZED, FOR SOLUTION INTRAVENOUS; PARENTERAL at 23:10

## 2019-01-01 RX ADMIN — POTASSIUM CHLORIDE 10 MEQ: 7.46 INJECTION, SOLUTION INTRAVENOUS at 12:00

## 2019-01-01 RX ADMIN — SODIUM CHLORIDE: 9 INJECTION, SOLUTION INTRAVENOUS at 16:12

## 2019-01-01 RX ADMIN — DESMOPRESSIN ACETATE 40 MG: 0.2 TABLET ORAL at 21:03

## 2019-01-01 RX ADMIN — HYDROCORTISONE SODIUM SUCCINATE 100 MG: 100 INJECTION, POWDER, FOR SOLUTION INTRAMUSCULAR; INTRAVENOUS at 12:01

## 2019-01-01 RX ADMIN — ASPIRIN 81 MG: 81 TABLET ORAL at 10:48

## 2019-01-01 RX ADMIN — POTASSIUM CHLORIDE 10 MEQ: 7.46 INJECTION, SOLUTION INTRAVENOUS at 05:39

## 2019-01-01 RX ADMIN — POTASSIUM & SODIUM PHOSPHATES POWDER PACK 280-160-250 MG 250 MG: 280-160-250 PACK at 21:03

## 2019-01-01 RX ADMIN — MIDODRINE HYDROCHLORIDE 10 MG: 5 TABLET ORAL at 18:44

## 2019-01-01 RX ADMIN — AZITHROMYCIN DIHYDRATE 500 MG: 500 INJECTION, POWDER, LYOPHILIZED, FOR SOLUTION INTRAVENOUS at 12:40

## 2019-01-01 RX ADMIN — Medication 10 ML: at 22:40

## 2019-01-01 RX ADMIN — MIDODRINE HYDROCHLORIDE 10 MG: 5 TABLET ORAL at 12:22

## 2019-01-01 RX ADMIN — HEPARIN SODIUM 5000 UNITS: 5000 INJECTION INTRAVENOUS; SUBCUTANEOUS at 14:13

## 2019-01-01 RX ADMIN — AZITHROMYCIN DIHYDRATE 500 MG: 500 INJECTION, POWDER, LYOPHILIZED, FOR SOLUTION INTRAVENOUS at 14:23

## 2019-01-01 RX ADMIN — TETRAKIS(2-METHOXYISOBUTYLISOCYANIDE)COPPER(I) TETRAFLUOROBORATE 15 MILLICURIE: 1 INJECTION, POWDER, LYOPHILIZED, FOR SOLUTION INTRAVENOUS at 08:05

## 2019-01-01 RX ADMIN — Medication 10 ML: at 12:07

## 2019-01-01 RX ADMIN — ASPIRIN 81 MG: 81 TABLET ORAL at 08:40

## 2019-01-01 RX ADMIN — ASPIRIN 81 MG: 81 TABLET ORAL at 09:07

## 2019-01-01 RX ADMIN — FAMOTIDINE 20 MG: 10 INJECTION, SOLUTION INTRAVENOUS at 09:07

## 2019-01-01 RX ADMIN — PHENYLEPHRINE HYDROCHLORIDE 100 MCG: 10 INJECTION INTRAVENOUS at 12:06

## 2019-01-01 RX ADMIN — HYDROCORTISONE SODIUM SUCCINATE 100 MG: 100 INJECTION, POWDER, FOR SOLUTION INTRAMUSCULAR; INTRAVENOUS at 05:41

## 2019-01-01 RX ADMIN — RIVAROXABAN 20 MG: 20 TABLET, FILM COATED ORAL at 17:56

## 2019-01-01 RX ADMIN — DESMOPRESSIN ACETATE 40 MG: 0.2 TABLET ORAL at 19:47

## 2019-01-01 RX ADMIN — PIPERACILLIN AND TAZOBACTAM 3.38 G: 3; .375 INJECTION, POWDER, LYOPHILIZED, FOR SOLUTION INTRAVENOUS; PARENTERAL at 15:30

## 2019-01-01 RX ADMIN — ASPIRIN 81 MG: 81 TABLET ORAL at 09:26

## 2019-01-01 RX ADMIN — MAGNESIUM SULFATE HEPTAHYDRATE 2 G: 1 INJECTION, SOLUTION INTRAVENOUS at 05:32

## 2019-01-01 RX ADMIN — PHENYLEPHRINE HYDROCHLORIDE 100 MCG: 10 INJECTION INTRAVENOUS at 11:34

## 2019-01-01 RX ADMIN — SENNOSIDES 8.6 MG: 8.6 TABLET, FILM COATED ORAL at 21:02

## 2019-01-01 RX ADMIN — DOBUTAMINE IN DEXTROSE 7 MCG/KG/MIN: 400 INJECTION, SOLUTION INTRAVENOUS at 04:33

## 2019-01-01 RX ADMIN — PHENYLEPHRINE HYDROCHLORIDE 200 MCG: 10 INJECTION INTRAVENOUS at 11:51

## 2019-01-01 RX ADMIN — ASPIRIN 81 MG: 81 TABLET ORAL at 08:49

## 2019-01-01 RX ADMIN — DESMOPRESSIN ACETATE 40 MG: 0.2 TABLET ORAL at 20:37

## 2019-01-01 RX ADMIN — Medication 10 ML: at 11:01

## 2019-01-01 RX ADMIN — Medication 10 ML: at 10:38

## 2019-01-01 RX ADMIN — ASPIRIN 81 MG: 81 TABLET ORAL at 11:00

## 2019-01-01 RX ADMIN — DOBUTAMINE IN DEXTROSE 2.5 MCG/KG/MIN: 400 INJECTION, SOLUTION INTRAVENOUS at 14:25

## 2019-01-01 RX ADMIN — DESMOPRESSIN ACETATE 40 MG: 0.2 TABLET ORAL at 21:16

## 2019-01-01 RX ADMIN — PHENYLEPHRINE HYDROCHLORIDE 200 MCG: 10 INJECTION INTRAVENOUS at 12:12

## 2019-01-01 RX ADMIN — POTASSIUM CHLORIDE 10 MEQ: 7.46 INJECTION, SOLUTION INTRAVENOUS at 18:42

## 2019-01-01 RX ADMIN — MIDODRINE HYDROCHLORIDE 10 MG: 5 TABLET ORAL at 18:07

## 2019-01-01 RX ADMIN — ASPIRIN 300 MG: 300 SUPPOSITORY RECTAL at 01:54

## 2019-01-01 RX ADMIN — Medication 10 ML: at 21:47

## 2019-01-01 RX ADMIN — PIPERACILLIN AND TAZOBACTAM 3.38 G: 3; .375 INJECTION, POWDER, LYOPHILIZED, FOR SOLUTION INTRAVENOUS; PARENTERAL at 16:30

## 2019-01-01 RX ADMIN — HEPARIN SODIUM 5000 UNITS: 5000 INJECTION INTRAVENOUS; SUBCUTANEOUS at 04:33

## 2019-01-01 RX ADMIN — POTASSIUM CHLORIDE 10 MEQ: 7.46 INJECTION, SOLUTION INTRAVENOUS at 11:03

## 2019-01-01 RX ADMIN — POTASSIUM & SODIUM PHOSPHATES POWDER PACK 280-160-250 MG 250 MG: 280-160-250 PACK at 18:35

## 2019-01-01 RX ADMIN — PIPERACILLIN AND TAZOBACTAM 3.38 G: 3; .375 INJECTION, POWDER, LYOPHILIZED, FOR SOLUTION INTRAVENOUS; PARENTERAL at 15:32

## 2019-01-01 RX ADMIN — Medication 10 ML: at 20:33

## 2019-01-01 RX ADMIN — ASPIRIN 81 MG: 81 TABLET ORAL at 11:30

## 2019-01-01 RX ADMIN — CALCIUM GLUCONATE 2 G: 98 INJECTION, SOLUTION INTRAVENOUS at 11:16

## 2019-01-01 RX ADMIN — Medication 10 ML: at 21:40

## 2019-01-01 RX ADMIN — DESMOPRESSIN ACETATE 40 MG: 0.2 TABLET ORAL at 22:54

## 2019-01-01 RX ADMIN — MIDODRINE HYDROCHLORIDE 10 MG: 5 TABLET ORAL at 16:46

## 2019-01-01 RX ADMIN — Medication 5 ML: at 18:18

## 2019-01-01 RX ADMIN — IOTHALAMATE MEGLUMINE 10 ML: 430 INJECTION INTRAVASCULAR at 17:06

## 2019-01-01 RX ADMIN — POTASSIUM & SODIUM PHOSPHATES POWDER PACK 280-160-250 MG 250 MG: 280-160-250 PACK at 20:36

## 2019-01-01 RX ADMIN — POTASSIUM CHLORIDE 10 MEQ: 7.46 INJECTION, SOLUTION INTRAVENOUS at 10:00

## 2019-01-01 RX ADMIN — PIPERACILLIN AND TAZOBACTAM 3.38 G: 3; .375 INJECTION, POWDER, LYOPHILIZED, FOR SOLUTION INTRAVENOUS; PARENTERAL at 23:32

## 2019-01-01 RX ADMIN — SODIUM CHLORIDE, PRESERVATIVE FREE 10 ML: 5 INJECTION INTRAVENOUS at 12:15

## 2019-01-01 RX ADMIN — MIDODRINE HYDROCHLORIDE 10 MG: 5 TABLET ORAL at 11:49

## 2019-01-01 RX ADMIN — MIDODRINE HYDROCHLORIDE 10 MG: 5 TABLET ORAL at 08:15

## 2019-01-01 RX ADMIN — ROCURONIUM BROMIDE 30 MG: 10 INJECTION INTRAVENOUS at 11:23

## 2019-01-01 RX ADMIN — CEFTRIAXONE SODIUM 1 G: 1 INJECTION, POWDER, FOR SOLUTION INTRAMUSCULAR; INTRAVENOUS at 13:08

## 2019-01-01 RX ADMIN — RIVAROXABAN 20 MG: 20 TABLET, FILM COATED ORAL at 17:21

## 2019-01-01 RX ADMIN — PIPERACILLIN AND TAZOBACTAM 3.38 G: 3; .375 INJECTION, POWDER, LYOPHILIZED, FOR SOLUTION INTRAVENOUS; PARENTERAL at 06:45

## 2019-01-01 RX ADMIN — POTASSIUM & SODIUM PHOSPHATES POWDER PACK 280-160-250 MG 250 MG: 280-160-250 PACK at 17:20

## 2019-01-01 RX ADMIN — SODIUM CHLORIDE 500 ML: 9 INJECTION, SOLUTION INTRAVENOUS at 03:54

## 2019-01-01 RX ADMIN — HEPARIN SODIUM AND DEXTROSE 12 UNITS/KG/HR: 10000; 5 INJECTION INTRAVENOUS at 17:46

## 2019-01-01 RX ADMIN — Medication 10 ML: at 09:35

## 2019-01-01 RX ADMIN — PHENYLEPHRINE HYDROCHLORIDE 100 MCG: 10 INJECTION INTRAVENOUS at 11:26

## 2019-01-01 RX ADMIN — POTASSIUM CHLORIDE 10 MEQ: 7.46 INJECTION, SOLUTION INTRAVENOUS at 14:54

## 2019-01-01 RX ADMIN — DESMOPRESSIN ACETATE 40 MG: 0.2 TABLET ORAL at 22:50

## 2019-01-01 RX ADMIN — SODIUM CHLORIDE: 9 INJECTION, SOLUTION INTRAVENOUS at 20:36

## 2019-01-01 RX ADMIN — ASPIRIN 81 MG: 81 TABLET ORAL at 08:16

## 2019-01-01 RX ADMIN — INSULIN HUMAN 10 UNITS: 100 INJECTION, SOLUTION PARENTERAL at 12:25

## 2019-01-01 RX ADMIN — HYDROCORTISONE SODIUM SUCCINATE 100 MG: 100 INJECTION, POWDER, FOR SOLUTION INTRAMUSCULAR; INTRAVENOUS at 04:33

## 2019-01-01 RX ADMIN — SODIUM CHLORIDE 1000 ML: 9 INJECTION, SOLUTION INTRAVENOUS at 01:16

## 2019-01-01 RX ADMIN — RIVAROXABAN 20 MG: 20 TABLET, FILM COATED ORAL at 18:00

## 2019-01-01 RX ADMIN — PHENYLEPHRINE HYDROCHLORIDE 100 MCG: 10 INJECTION INTRAVENOUS at 11:45

## 2019-01-01 RX ADMIN — MIDODRINE HYDROCHLORIDE 5 MG: 5 TABLET ORAL at 12:00

## 2019-01-01 RX ADMIN — FAMOTIDINE 20 MG: 10 INJECTION, SOLUTION INTRAVENOUS at 08:49

## 2019-01-01 RX ADMIN — MIDODRINE HYDROCHLORIDE 5 MG: 5 TABLET ORAL at 08:16

## 2019-01-01 RX ADMIN — PIPERACILLIN AND TAZOBACTAM 3.38 G: 3; .375 INJECTION, POWDER, LYOPHILIZED, FOR SOLUTION INTRAVENOUS; PARENTERAL at 17:09

## 2019-01-01 RX ADMIN — FENTANYL CITRATE 50 MCG: 50 INJECTION INTRAMUSCULAR; INTRAVENOUS at 11:49

## 2019-01-01 RX ADMIN — REGADENOSON 0.4 MG: 0.08 INJECTION, SOLUTION INTRAVENOUS at 12:15

## 2019-01-01 RX ADMIN — Medication 10 ML: at 20:59

## 2019-01-01 RX ADMIN — AZITHROMYCIN DIHYDRATE 500 MG: 500 INJECTION, POWDER, LYOPHILIZED, FOR SOLUTION INTRAVENOUS at 11:31

## 2019-01-01 RX ADMIN — Medication 10 ML: at 08:55

## 2019-01-01 RX ADMIN — TETRAKIS(2-METHOXYISOBUTYLISOCYANIDE)COPPER(I) TETRAFLUOROBORATE 34.3 MILLICURIE: 1 INJECTION, POWDER, LYOPHILIZED, FOR SOLUTION INTRAVENOUS at 12:15

## 2019-01-01 RX ADMIN — FUROSEMIDE 20 MG: 10 INJECTION, SOLUTION INTRAMUSCULAR; INTRAVENOUS at 11:23

## 2019-01-01 RX ADMIN — FAMOTIDINE 20 MG: 10 INJECTION, SOLUTION INTRAVENOUS at 09:34

## 2019-01-01 RX ADMIN — Medication 10 ML: at 08:39

## 2019-01-01 RX ADMIN — HEPARIN SODIUM 5000 UNITS: 5000 INJECTION INTRAVENOUS; SUBCUTANEOUS at 05:27

## 2019-01-01 RX ADMIN — Medication 10 ML: at 22:59

## 2019-01-01 RX ADMIN — PIPERACILLIN AND TAZOBACTAM 3.38 G: 3; .375 INJECTION, POWDER, LYOPHILIZED, FOR SOLUTION INTRAVENOUS; PARENTERAL at 08:40

## 2019-01-01 RX ADMIN — FENTANYL CITRATE 25 MCG: 50 INJECTION INTRAMUSCULAR; INTRAVENOUS at 21:24

## 2019-01-01 RX ADMIN — POTASSIUM & SODIUM PHOSPHATES POWDER PACK 280-160-250 MG 250 MG: 280-160-250 PACK at 08:39

## 2019-01-01 RX ADMIN — Medication 10 ML: at 10:49

## 2019-01-01 RX ADMIN — POTASSIUM CHLORIDE 10 MEQ: 7.46 INJECTION, SOLUTION INTRAVENOUS at 13:00

## 2019-01-01 RX ADMIN — Medication 10 ML: at 21:22

## 2019-01-01 RX ADMIN — MIDODRINE HYDROCHLORIDE 10 MG: 5 TABLET ORAL at 08:35

## 2019-01-01 RX ADMIN — POTASSIUM CHLORIDE 20 MEQ: 40 SOLUTION ORAL at 21:40

## 2019-01-01 RX ADMIN — FAMOTIDINE 20 MG: 10 INJECTION, SOLUTION INTRAVENOUS at 08:40

## 2019-01-01 RX ADMIN — GLYCOPYRROLATE 0.1 MG: 0.2 INJECTION INTRAMUSCULAR; INTRAVENOUS at 15:08

## 2019-01-01 RX ADMIN — FUROSEMIDE 20 MG: 20 TABLET ORAL at 11:00

## 2019-01-01 RX ADMIN — DESMOPRESSIN ACETATE 40 MG: 0.2 TABLET ORAL at 23:00

## 2019-01-01 RX ADMIN — POTASSIUM CHLORIDE 10 MEQ: 7.46 INJECTION, SOLUTION INTRAVENOUS at 10:01

## 2019-01-01 RX ADMIN — DESMOPRESSIN ACETATE 40 MG: 0.2 TABLET ORAL at 21:40

## 2019-01-01 RX ADMIN — POTASSIUM CHLORIDE 10 MEQ: 7.46 INJECTION, SOLUTION INTRAVENOUS at 08:47

## 2019-01-01 RX ADMIN — Medication 10 ML: at 21:00

## 2019-01-01 ASSESSMENT — PULMONARY FUNCTION TESTS
PIF_VALUE: 22
PIF_VALUE: 15
PIF_VALUE: 12
PIF_VALUE: 20
PIF_VALUE: 1
PIF_VALUE: 12
PIF_VALUE: 19
PIF_VALUE: 21
PIF_VALUE: 16
PIF_VALUE: 21
PIF_VALUE: 1
PIF_VALUE: 11
PIF_VALUE: 16
PIF_VALUE: 12
PIF_VALUE: 18
PIF_VALUE: 1
PIF_VALUE: 16
PIF_VALUE: 19
PIF_VALUE: 21
PIF_VALUE: 13
PIF_VALUE: 22
PIF_VALUE: 22
PIF_VALUE: 8
PIF_VALUE: 15
PIF_VALUE: 14
PIF_VALUE: 21
PIF_VALUE: 17
PIF_VALUE: 12
PIF_VALUE: 16
PIF_VALUE: 21
PIF_VALUE: 14
PIF_VALUE: 32
PIF_VALUE: 21
PIF_VALUE: 7
PIF_VALUE: 15
PIF_VALUE: 1
PIF_VALUE: 15
PIF_VALUE: 16
PIF_VALUE: 23
PIF_VALUE: 22
PIF_VALUE: 16
PIF_VALUE: 22
PIF_VALUE: 22
PIF_VALUE: 12
PIF_VALUE: 16
PIF_VALUE: 22
PIF_VALUE: 1
PIF_VALUE: 13
PIF_VALUE: 20
PIF_VALUE: 16
PIF_VALUE: 21
PIF_VALUE: 21
PIF_VALUE: 1
PIF_VALUE: 22
PIF_VALUE: 11
PIF_VALUE: 21
PIF_VALUE: 16
PIF_VALUE: 8
PIF_VALUE: 9
PIF_VALUE: 15
PIF_VALUE: 23
PIF_VALUE: 21
PIF_VALUE: 11
PIF_VALUE: 21
PIF_VALUE: 16
PIF_VALUE: 3
PIF_VALUE: 22
PIF_VALUE: 2
PIF_VALUE: 21
PIF_VALUE: 16
PIF_VALUE: 24
PIF_VALUE: 22
PIF_VALUE: 22
PIF_VALUE: 21
PIF_VALUE: 10
PIF_VALUE: 17
PIF_VALUE: 21
PIF_VALUE: 2
PIF_VALUE: 21
PIF_VALUE: 14
PIF_VALUE: 16
PIF_VALUE: 24
PIF_VALUE: 22
PIF_VALUE: 15
PIF_VALUE: 20
PIF_VALUE: 17
PIF_VALUE: 22
PIF_VALUE: 15
PIF_VALUE: 12
PIF_VALUE: 14
PIF_VALUE: 15
PIF_VALUE: 17
PIF_VALUE: 22
PIF_VALUE: 16
PIF_VALUE: 22
PIF_VALUE: 12
PIF_VALUE: 24
PIF_VALUE: 0
PIF_VALUE: 22
PIF_VALUE: 21
PIF_VALUE: 17
PIF_VALUE: 23
PIF_VALUE: 15
PIF_VALUE: 13
PIF_VALUE: 34
PIF_VALUE: 18
PIF_VALUE: 22
PIF_VALUE: 12
PIF_VALUE: 15
PIF_VALUE: 22
PIF_VALUE: 16
PIF_VALUE: 15
PIF_VALUE: 12
PIF_VALUE: 5
PIF_VALUE: 21
PIF_VALUE: 13
PIF_VALUE: 16
PIF_VALUE: 21
PIF_VALUE: 22
PIF_VALUE: 19
PIF_VALUE: 16
PIF_VALUE: 17
PIF_VALUE: 15
PIF_VALUE: 5
PIF_VALUE: 22
PIF_VALUE: 21
PIF_VALUE: 12
PIF_VALUE: 12
PIF_VALUE: 1
PIF_VALUE: 22
PIF_VALUE: 16

## 2019-01-01 ASSESSMENT — PAIN SCALES - PAIN ASSESSMENT IN ADVANCED DEMENTIA (PAINAD)
BODYLANGUAGE: 0
BREATHING: 0
TOTALSCORE: 0
NEGVOCALIZATION: 0
CONSOLABILITY: 0
BREATHING: 0
FACIALEXPRESSION: 0
NEGVOCALIZATION: 0
BODYLANGUAGE: 0
BODYLANGUAGE: 0
TOTALSCORE: 0
BREATHING: 0
BREATHING: 0
CONSOLABILITY: 0
FACIALEXPRESSION: 0
BREATHING: 0
BREATHING: 0
CONSOLABILITY: 0
TOTALSCORE: 0
BREATHING: 0
CONSOLABILITY: 0
FACIALEXPRESSION: 0
FACIALEXPRESSION: 0
CONSOLABILITY: 0
TOTALSCORE: 0
BODYLANGUAGE: 0
BODYLANGUAGE: 0
BREATHING: 0
TOTALSCORE: 0
CONSOLABILITY: 0
NEGVOCALIZATION: 0
FACIALEXPRESSION: 0
NEGVOCALIZATION: 0
NEGVOCALIZATION: 0
CONSOLABILITY: 0
CONSOLABILITY: 0
NEGVOCALIZATION: 0
TOTALSCORE: 0
TOTALSCORE: 0
FACIALEXPRESSION: 0
CONSOLABILITY: 0
FACIALEXPRESSION: 0
BREATHING: 0
TOTALSCORE: 0
BODYLANGUAGE: 0
BODYLANGUAGE: 0
CONSOLABILITY: 0
BREATHING: 0
TOTALSCORE: 0
CONSOLABILITY: 0
FACIALEXPRESSION: 0
BODYLANGUAGE: 0
CONSOLABILITY: 0
TOTALSCORE: 0
NEGVOCALIZATION: 0
TOTALSCORE: 0
BODYLANGUAGE: 0
FACIALEXPRESSION: 0
BREATHING: 0
BODYLANGUAGE: 0
BREATHING: 0
NEGVOCALIZATION: 0
NEGVOCALIZATION: 0
CONSOLABILITY: 0
TOTALSCORE: 0
NEGVOCALIZATION: 0
FACIALEXPRESSION: 0
TOTALSCORE: 0
CONSOLABILITY: 0
BREATHING: 0
NEGVOCALIZATION: 0
TOTALSCORE: 0
BODYLANGUAGE: 0
BREATHING: 0
BODYLANGUAGE: 0
FACIALEXPRESSION: 0
NEGVOCALIZATION: 0
BREATHING: 0
TOTALSCORE: 0
FACIALEXPRESSION: 0
TOTALSCORE: 0
CONSOLABILITY: 0
NEGVOCALIZATION: 0
BODYLANGUAGE: 0
FACIALEXPRESSION: 0
BREATHING: 0
FACIALEXPRESSION: 0
BODYLANGUAGE: 0
BODYLANGUAGE: 0
TOTALSCORE: 0
FACIALEXPRESSION: 0
FACIALEXPRESSION: 0
BREATHING: 0
TOTALSCORE: 0
NEGVOCALIZATION: 0
BREATHING: 0
BODYLANGUAGE: 0
NEGVOCALIZATION: 0
BODYLANGUAGE: 0
CONSOLABILITY: 0
BODYLANGUAGE: 0
NEGVOCALIZATION: 0
FACIALEXPRESSION: 0
CONSOLABILITY: 0
FACIALEXPRESSION: 0
CONSOLABILITY: 0

## 2019-01-01 ASSESSMENT — PAIN SCALES - WONG BAKER

## 2019-01-01 ASSESSMENT — PAIN SCALES - GENERAL
PAINLEVEL_OUTOF10: 0
PAINLEVEL_OUTOF10: 4
PAINLEVEL_OUTOF10: 4
PAINLEVEL_OUTOF10: 0
PAINLEVEL_OUTOF10: 6
PAINLEVEL_OUTOF10: 0

## 2019-01-01 ASSESSMENT — PAIN - FUNCTIONAL ASSESSMENT: PAIN_FUNCTIONAL_ASSESSMENT: FACES

## 2019-03-28 PROBLEM — R29.90 STROKE-LIKE SYMPTOM: Status: ACTIVE | Noted: 2019-01-01

## 2019-03-28 NOTE — PROGRESS NOTES
Nursing Swallow Screen      **Complete swallow screen BEFORE any PO intake, including medications**      Have patient in highest tolerable sitting position. Have oral suction equipment available at bedside. Explain testing and rationale to patient. Is the patient alert and can follow simple commands? YES If NO stop   Does the patient have a clear strong voice and can vocalize on command? NO If NO stop   Is the patient's speech SEVERELY slurred or garbled? If YES stop   Can the patient voluntarily cough two times? If NO stop   Is the patient able to maintain own secretions? If NO stop   Is the patient is able to swallow 60ml of water without throat clearing, choking, gurgling, coughing, dribbling, or drooling? If NO stop   Is the patient able to swallow 60ml of water from a STRAW without throat clearing, choking, gurgling, coughing, dribbling, or drooling? If NO stop       If patient is unable to complete swallow screen successfully, patient should remain NPO until further swallow evaluation is completed by speech therapy. If patient is able to complete screen, notify physician to obtain diet and PO medication orders.          Result:  failed

## 2019-03-28 NOTE — H&P
Neuro ICU History & Physical    Patient Name: Anushka Rangel  Patient : 1950  Room/Bed: [facility]/6214-55  Allergies: Allergies   Allergen Reactions    Other Shortness Of Breath     Sawdust.       Ace Inhibitors Other (See Comments)     Hypotension, dizziness.  Beta Adrenergic Blockers Other (See Comments)     Hypotension, dizziness.  Adhesive Tape Rash     Problem List:   Patient Active Problem List   Diagnosis    Elevated fasting glucose    Nonischemic cardiomyopathy (HCC)    Hyperlipidemia    Anxiety    History of tobacco abuse    Fibrocystic breast disease    Mild mitral regurgitation    Sick sinus syndrome (HCC)    Automatic implantable cardioverter-defibrillator in situ    Cerebral infarction (HCC)    Superior mesenteric vein thrombosis    Functional urinary incontinence    Expressive aphasia    Stroke-like symptom       CHIEF COMPLAINT     Altered mental status, non-verbal, right sided weakness    HPI    History Obtained From: Life flight, previous records, ED provider    The patient is a 81 y.o. female presented with presents with concern for CVA. Transfer from [facility] ED, presented with NIH 21. Last known well 1200 3/27/18. Previous records indicate pt found by family w/ AMS, aphasia, right sided deficits. PMH of previous CVA, was on outpatient Xarelto but pt discontinued a few months. Previous dysarthria, no reported residual weakness. Symptom onset has been constant for a time period of 13 hour(s). Severity is described as moderate to severe. Course of her symptoms over time is constant since onset. CT head, CTA head/neck obtain prior to arrival.           Admitted to ICU From: ED  Reason for ICU Admission: Stroke like symptoms w/ elevated NIH at 20       PATIENT HISTORY   Past Medical History:        Diagnosis Date    Anxiety     Blurred vision, bilateral     mild.  Bradycardia     intolerant of beta blockers, intolerant of ACE inhibitors.      Chronic renal insufficiency     Constipation     Depression     Elevated fasting glucose     Encephalomalacia     parietal.     Fibrocystic breast disease     Frontal headache     bilateral, mild, transient.  Genital atrophy of female     Grief reaction     death of mother.  Hiatal hernia     extremely large.  History of tobacco abuse     now quit.  Hyperlipidemia     Ileus (HCC)     Mild mitral regurgitation     Nonischemic cardiomyopathy (HCC)     ejection fraction less than 30%, implantation of ICD.  Overweight     Paraesophageal hiatal hernia     Sick sinus syndrome ([address]     Stroke ([facility] 07/19/2006    acute, history of stroke 07/2001 with no significant neurological deficit, thought to be due to transient atrial fibrillation, right sided hemiparesis, aphasia.  Superior mesenteric vein thrombosis     Tinnitus of right ear     constant, since 1989. Past Surgical History:        Procedure Laterality Date    CARDIAC CATHETERIZATION  01/10/2008    significant cardiomyopathy, ejection fraction 30%, global hypokinesis, sinus bradycardia into the 40s transiently, frequent PVCs, otherwise normal.     CARDIAC DEFIBRILLATOR PLACEMENT  04/11/2008    ICD in situ.  GASTROSTOMY TUBE PLACEMENT  8-    attempted peg tube, unsuccessful    HYSTERECTOMY, VAGINAL  09/22/1993    total with BSO for benign tumors.  ILEOSTOMY OR JEJUNOSTOMY  08/30/2013    jejunostomy    LAPAROSCOPY  05/14/1982    with D&C.      STOMACH SURGERY  2015    gastropexy with G-tube holding in place       Social History:   Social History     Socioeconomic History    Marital status:      Spouse name: Not on file    Number of children: Not on file    Years of education: Not on file    Highest education level: Not on file   Occupational History    Not on file   Social Needs    Financial resource strain: Not on file    Food insecurity:     Worry: Not on file     Inability: Not on file   [employer] Transportation needs:     Medical: Not on file     Non-medical: Not on file   Tobacco Use    Smoking status: Former Smoker     Packs/day: 0.50     Years: 26.00     Pack years: 13.00     Last attempt to quit: 7/15/2006     Years since quittin.7    Smokeless tobacco: Never Used    Tobacco comment: 1/2 pack a day, started in approximately , quit 2006   Substance and Sexual Activity    Alcohol use: No     Alcohol/week: 0.0 oz    Drug use: No    Sexual activity: Never   Lifestyle    Physical activity:     Days per week: Not on file     Minutes per session: Not on file    Stress: Not on file   Relationships    Social connections:     Talks on phone: Not on file     Gets together: Not on file     Attends Rastafari service: Not on file     Active member of club or organization: Not on file     Attends meetings of clubs or organizations: Not on file     Relationship status: Not on file    Intimate partner violence:     Fear of current or ex partner: Not on file     Emotionally abused: Not on file     Physically abused: Not on file     Forced sexual activity: Not on file   Other Topics Concern    Not on file   Social History Narrative    Not on file       Family History:       Problem Relation Age of Onset    Breast Cancer Mother 62    Cancer Mother         pancreatic    Diabetes Mother         \"borderline\"    High Blood Pressure Mother     Mental Illness Mother     Cancer Father         laryngeal    Diabetes Father     Stroke Maternal Grandmother     Diabetes Maternal Grandmother     Stroke Maternal Grandfather     Diabetes Maternal Grandfather     Stomach Cancer Paternal Grandmother     COPD Other        Allergies: Other; Ace inhibitors;  Beta adrenergic blockers; and Adhesive tape    Medications Prior to Admission:    Medications Prior to Admission: atorvastatin (LIPITOR) 40 MG tablet, take 1 tablet by mouth once daily  XARELTO 20 MG TABS tablet, take 1 tablet by mouth once deformities    LUNGS:  Coarse breath sounds bilaterally with transmitted upper airway sounds   CARDIOVASCULAR:  normal s1 / s2   ABDOMEN:  Soft, no rigidity   NEUROLOGIC:  Mental Status:  Non verbal, following commands,awake             Cranial Nerves:    II: PERRLA  III, IV, VI: EOMI  V: Intact muscles of mastication, no facial droop  VII: b/l reaction to visual confrontation     Motor Exam:    Drift:  present - L sided resistance to gravity, R sided flaccid paralysis  Tone:  normal     LUE 3/5, LUE 3/5,  RUE 1/5 RLE 1/5     Sensory:    Touch:    Unable to obtain as pt non-verbal     Deep Tendon Reflexes:    Right Bicep:  2+  Left Bicep:  2+  Right Knee:  2+  Left Knee:  2+     Plantar Response:    Right:  downgoing  Left:  downgoing     Clonus:  absent        Coordination/Dysmetria:  Heel to Shin:  N/A  Finger to Nose:   N/A  Dysdiadochokinesia:  n/A     Gait:  N/A   SKIN:  no rash          INITIAL NIH STROKE SCALE     Time Performed:  0110 AM     Administer stroke scale items in the order listed. Record performance in each category after each subscale exam. Do not go back and change scores. Follow directions provided for each exam technique. Scores should reflect what the patient does, not what the clinician thinks the patient can do. The clinician should record answers while administering the exam and work quickly. Except where indicated, the patient should not be coached (i.e., repeated requests to patient to make a special effort).      1a. Level of consciousness:  0 - alert; keenly responsive  1b. Level of consciousness questions:  2 - answers neither question correctly  1c. Level of consciousness questions:  0 - performs both tasks correctly  2. Best Gaze:  0 - normal  3. Visual:  0 - no visual loss  4. Facial Palsy:  0 - normal symmetric movement  5a. Motor left arm:  4 - no movement  5b.   Motor right arm:  2 - some effort against gravity, limb cannot get to or maintain (if cued) 90 (or 45) degrees, drifts down to bed, but has some effort against gravity   6a. Motor left le - no movement  6b. Motor right leg:  3 - no effort against gravity; leg falls to bed immediately  7. Limb Ataxia:  0 - absent  8. Sensory:  0 - normal; no sensory loss  9. Best Language:  3 - mute, global aphasia; no usable speech or auditory comprehension  10. Dysarthria:  2 - severe; patient speech is so slurred as to be unintelligible in the absence of or our of proportion to any dysphagia, or is mute/anarthric   11. Extinction and Inattention:  0 - no abnormality     TOTAL:  20               LABS AND IMAGING:     RECENT LABS:  CBC with Differential:    Lab Results   Component Value Date    WBC 14.1 2019    RBC 4.58 2019    HGB 14.0 2019    HCT 43.2 2019     2019    MCV 94.3 2019    MCH 30.6 2019    MCHC 32.4 2019    RDW 13.1 2019    LYMPHOPCT 6 2019    LYMPHOPCT 3.7 2019    MONOPCT 3 2019    MONOPCT 2.6 2019    EOSPCT 0.2 2019    BASOPCT 0 2019    BASOPCT 0.8 2019    MONOSABS 0.35 2019    MONOSABS 0.3 2019    LYMPHSABS 0.81 2019    LYMPHSABS 0.5 2019    EOSABS <0.03 2019    EOSABS 0.0 2019    BASOSABS 0.04 2019    DIFFTYPE NOT REPORTED 2019     BMP:    Lab Results   Component Value Date     2019    K 4.2 2019     2019    CO2 19 2019    BUN 16 2019    LABALBU 4.1 2019    CREATININE 0.97 2019    CALCIUM 9.0 2019    GFRAA >60 2019    LABGLOM 57 2019    GLUCOSE 102 2019    GLUCOSE 97 2019       RADIOLOGY:   No results found. Labs and Images reviewed with:    [x] Hilda Flores MD    [] Jose Burrows MD  [] Kenrick Lamas MD  --[] there are no new interval images to review.      ASSESSMENT AND PLAN:         ASSESSMENT:     This is a 81 y.o. female transfer from [facility] ED after presenting with aphasia, right sided deficits. Last known well 1200 3/27/19. PMH of previous CVA with residual dysarthria. CTH, CTA head/neck negative. Stroke alert called on arrival. Neuro-endovascular surgery aware prior to arrival        Patient care will be discussed with attending, will reevaluate patient along with attending. PLAN/MEDICAL DECISION MAKING:    Aphasia  Right sided weakness  Elevated troponin w/ lateral EKG t-wave changes      NEUROLOGIC:  - CT head, CTA head, neck obtained and reviewed. MRI w/o contrast ordered. - AEDs: Not indicated at this time  - Goal -180  - Neuro checks per protocol    CARDIOVASCULAR:  - Goal -180  - Troponin elevated at previous facility, high sensitivity troponin elevated on arrival.   - EKG shows new onset lateral T-wave changes. Cardiology consulted and agreed with heparin gtt when approved by Neuro-endovascular NS.   - Continue telemetry    PULMONARY:  - On NC 3 L  - Monitor vitals, RT to evaluate and treat  - Daily AB.43/30.8/78.8/20.4    RENAL/FLUID/ELECTROLYTE:  - BUN 16/ Creatinine 0.97  - Monitor urine output  - IVF: NS @ 75 ml/hr  - Replace electrolytes PRN  - Daily BMP    GI/NUTRITION:  NUTRITION:  Diet NPO Effective Now  - Bowel regimen: MoM PRN  - GI prophylaxis: Protonix 40 mg IV qd    ID:  - Mild leukocytosis  - UA, Urine culture, procalcitonin, lactic acid, blood cultures ordered and pending  - Continue to monitor for fevers  - Daily CBC    HEME:   - H&H 14.0/43.2  - Platelets 095  - Daily CBC    ENDOCRINE:  - Continue to monitor blood glucose, goal <180  -    OTHER:  - PT/OT/ST   - Code Status: Full    PROPHYLAXIS:  Stress ulcer: PPI    DVT PROPHYLAXIS:  - SCD sleeves - Thigh High   - REX stockings - Thigh High  - No chemoprophylaxis anticoagulation at this time.  -Heparin pending MRI results, Cardiology recommendations.    -Aspirin 325 FL given in ED      DISPOSITION: Admit to Neuro ICU      Deedee Shane MD  Neuro Critical Care Service   Pager [phone]  3/28/2019     3:18 AM

## 2019-03-28 NOTE — CARE COORDINATION
Case Management Initial Discharge Plan  Gerinayana Arturo,             Met with:family member sister [facility] and niece to discuss discharge plans. Information verified: address, contacts, phone number, , insurance Yes  PCP: Jeanne Pelayo DO  Date of last visit: 10/18/18    Insurance Provider: Medicare/Medicaid    Discharge Planning    Living Arrangements:  Alone   Support Systems:  Family Members    Home has 1 stories  0 stairs to climb to get into front door, 0stairs to climb to reach second floor  Location of bedroom/bathroom in home main    Patient able to perform ADL's:Independent    Current Services (outpatient & in home) none known  DME equipment: unsure/none  DME provider:     Pharmacy: [facility] Medications:     Does patient want to participate in local refill/ meds to beds program?       Potential Assistance Needed:  Other (Comment)(Rehab/SNF)    Patient agreeable to home care: unsure  [place] of choice provided:  n/a    Prior SNF/Rehab Placement and Facility: [facility] and [address] to SNF/Rehab: Yes, probable need  Freedom of choice provided: n/a   Evaluation: no    Expected Discharge date:     Patient expects to be discharged to:  Rehab/SNF  Follow Up Appointment: Best Day/ Time:      Transportation provider: [address] [place] [address]  Transportation arrangements needed for discharge: Yes    Readmission Risk              Risk of Unplanned Readmission:        11             Does patient have a readmission risk score greater than 14?: No  If yes, follow-up appointment must be made within 7 days of discharge. Discharge Plan: Anticipate SNF/Rehab. Awaiting therapy evaluations. Writer met with pt's sister [facility] and pt's niece. Olimpia's contact info added to Epic- phone [phone]. Pt was at nephew's  at time of last known well. Pt had previous stroke and went to [facility] of [address] [place] [address].   Pt also previously had assisted living apt at [facility]. Pt and now lives alone in her own apartment and very independent per Sanjeev Shadow. Will monitor for discharge needs.           Electronically signed by Cruz Valenzuela RN on 3/28/19 at 5:51 PM

## 2019-03-28 NOTE — CONSULTS
[facility] Cardiology    Consult / H&P               Today's Date: 3/28/2019  Patient Name: Sola Mejia  Date of admission: 3/28/2019  1:05 AM  Patient's age: 81 y.o., 1950  Admission Dx: Stroke-like symptom [R29.90]    Reason for Consult:  Cardiac evaluation    Requesting Physician: Dallas Sharp MD    CHIEF COMPLAINT:  Elevated troponins    History Obtained From:  electronic medical record    HISTORY OF PRESENT ILLNESS:      The patient is a 81 y.o.  female who is admitted to the hospital for a CVA which occurred in [facility] ED. The patient presented with an elevated NIH score of 21. As per family, her last known well was at 15 PM on 3/27/19. The patient has a past med hx of CVA, was on on Xarelto but discontinued it by herself after a few months. Since admission, the patient has been alert although she is unable to answer any questions and has only been able to move her right arm. Speech has been unintelligible and the patient has been mute. The patient was found to have elevated troponins the last two values have been 149 and 168 trending upwards. The patient last saw Dr. Abraham England on 2/28/19 and has a diagnosis of NICM and ch systolic HF, EF was 42%. Has an ICD in place. RF includes HTN, hx of multiple CVAs. Past Medical History:   has a past medical history of Anxiety, Blurred vision, bilateral, Bradycardia, Chronic renal insufficiency, Constipation, Depression, Elevated fasting glucose, Encephalomalacia, Fibrocystic breast disease, Frontal headache, Genital atrophy of female, Grief reaction, Hiatal hernia, History of tobacco abuse, Hyperlipidemia, Ileus (HCC), Mild mitral regurgitation, Nonischemic cardiomyopathy ([address] Overweight, Paraesophageal hiatal hernia, Sick sinus syndrome ([address] Stroke ([address] Superior mesenteric vein thrombosis, and Tinnitus of right ear.     Past Surgical History:   has a past surgical history that includes laparoscopy (05/14/1982); Hysterectomy, vaginal (09/22/1993); Cardiac defibrillator placement (04/11/2008); Cardiac catheterization (01/10/2008); Gastrostomy tube placement (8-); ileostomy or jejunostomy (08/30/2013); and Stomach surgery (2015). Home Medications:    Prior to Admission medications    Medication Sig Start Date End Date Taking? Authorizing Provider   atorvastatin (LIPITOR) 40 MG tablet take 1 tablet by mouth once daily 2/12/19   Jeferson Hollis, DO   XARELTO 20 MG TABS tablet take 1 tablet by mouth once daily 10/12/18   Jeferson ENRICO Hollis, DO   donepezil (ARICEPT) 10 MG tablet Take 10 mg by mouth daily    Historical Provider, MD Sandi CORTÉS [place] [address] Take by mouth daily    Historical Provider, MD   meclizine (ANTIVERT) 25 MG tablet Take 1 tablet by mouth 2 times daily as needed for Dizziness 7/2/17   Gabriela Del Castillo MD   aspirin 81 MG tablet Take 1 tablet by mouth daily. Medication per j tube 9/2/14   Jenny Cruz      Current Facility-Administered Medications: sodium chloride flush 0.9 % injection 10 mL, 10 mL, Intravenous, 2 times per day  sodium chloride flush 0.9 % injection 10 mL, 10 mL, Intravenous, PRN  magnesium hydroxide (MILK OF MAGNESIA) 400 MG/5ML suspension 30 mL, 30 mL, Oral, Daily PRN  ondansetron (ZOFRAN) injection 4 mg, 4 mg, Intravenous, Q6H PRN  aspirin EC tablet 81 mg, 81 mg, Oral, Daily  famotidine (PEPCID) injection 20 mg, 20 mg, Intravenous, Daily  acetaminophen (TYLENOL) suppository 650 mg, 650 mg, Rectal, Q4H PRN  hydrALAZINE (APRESOLINE) injection 5 mg, 5 mg, Intravenous, Q6H PRN  0.9 % sodium chloride infusion, , Intravenous, Continuous    Allergies: Other; Ace inhibitors; Beta adrenergic blockers; and Adhesive tape    Social History:   reports that she quit smoking about 12 years ago. She has a 13.00 pack-year smoking history. She has never used smokeless tobacco. She reports that she does not drink alcohol or use drugs.      Family History: family history includes Breast Cancer (age of onset: 62) in her mother; COPD in an other family member; Cancer in her father and mother; Diabetes in her father, maternal grandfather, maternal grandmother, and mother; High Blood Pressure in her mother; Mental Illness in her mother; Stomach Cancer in her paternal grandmother; Stroke in her maternal grandfather and maternal grandmother. No h/o sudden cardiac death. No for premature CAD    REVIEW OF SYSTEMS:    · Unable to perform ROS as patient is mute due to CVA      PHYSICAL EXAM:      BP (!) 107/56   Pulse 70   Temp 99.4 °F (37.4 °C)   Resp 19   Ht 5' 4\" (1.626 m)   Wt 114 lb 6.7 oz (51.9 kg)   SpO2 100%   BMI 19.64 kg/m²    Constitutional and General Appearance: alert, well developed in NAD  HEENT: PERRL, no cervical lymphadenopathy. No masses palpable. Normal oral mucosa  Respiratory:  · Coarse breath sounds noted  Cardiovascular:  · The apical impulse is not displaced  · Heart tones are crisp and normal. regular S1 and S2.  · Peripheral pulses are symmetrical and full   Abdomen:   · No masses or tenderness  · Bowel sounds present  Extremities:  ·  No Cyanosis or Clubbing  ·  Lower extremity edema: No  ·  Skin: Warm and dry  Neurological:  · Alert  · Does not move extremities spontaneously   · Dyasthria and speech difficulty    DATA:    Diagnostics:    Cardiac Testing      ICD generator change 11/25/13: [employer] [place]     CATH 1/10/08: NICM. EF 30%     Dr. Ophelia Perez -- 2/28/19   Nonischemic cardiomyopathy, chronic systolic HF. COMPENSATED  AICD IN SITU   HTN  Multiple CVAs.   A/C WITH XARELTO        Labs:     CBC:   Recent Labs     03/27/19 2219 03/28/19 0204   WBC 13.3* 14.1*   HGB 15.2* 14.0   HCT 46.4* 43.2    163     BMP:   Recent Labs     03/27/19 2219 03/28/19  0204    141   K 4.2 4.2   CO2 22 19*   BUN 20* 16   CREATININE 1.30* 0.97*   LABGLOM 43* 57*   GLUCOSE 97 102*      PT/INR:   Recent Labs     03/27/19 2219 03/28/19 0204   PROTIME 10.7 10.5   INR 1.05 [facility]      [phone]

## 2019-03-28 NOTE — BRIEF OP NOTE
Brief Postoperative Note    Mark Sierra  YOB: 1950  [id number]    Pre-operative Diagnosis: Stroke    Post-operative Diagnosis: Same    Procedure: NG tube placement    Anesthesia: Local and None    Surgeons/Assistants: Britany Matamoros MD    Estimated Blood Loss: less than 50     Complications: None    Specimens: Was Not Obtained    Findings: 25 F sump NG tube inserted under fluoro with tip in intrathoracic stomach. Tube is ready for use at this time.     Electronically signed by Britany Matamoros MD on 3/28/2019 at 5:11 PM

## 2019-03-28 NOTE — PROGRESS NOTES
Physical Therapy  DATE: 3/28/2019    NAME: Silva Mcneil  MRN: [id number]   : 1950    Patient not seen this date for Physical Therapy due to:  [] Blood transfusion in progress  [] Hemodialysis  []  Patient Declined  [] Spine Precautions   [] Strict Bedrest  [] Surgery/ Procedure  [] Testing      [x] Other- 3/28/19 Hold PT d/t troponin levels trending up. CK 3/29/19        [] PT being discontinued at this time. Patient independent. No further needs. [] PT being discontinued at this time as the patient has been transferred to palliative care. No further needs.     Katlyn Flores, SPT

## 2019-03-28 NOTE — ED NOTES
Pt medicated and placed into brief. Pt laughing at [facility].       Pascale Draper RN  03/28/19 3855

## 2019-03-28 NOTE — PROGRESS NOTES
Cardiac Testing     ICD generator change 11/25/13: [employer] [place]     CATH 1/10/08: NICM. EF 30%    Dr. Roderick Palacios -- 2/28/19   Nonischemic cardiomyopathy, chronic systolic HF. COMPENSATED  AICD IN SITU   HTN  Multiple CVAs.   A/C WITH Joel Delaney

## 2019-03-28 NOTE — CONSULTS
Endovascular Neurosurgery Note  Stroke Alert paged @ 8055  ER Room # 23  Arrival to patient bedside @ 0110  3/28/2019 1:23 AM    Pt Name: Mark Sierra  MRN: [id number]  Bessyurt: 1950  Date of evaluation: 3/28/2019  Primary Care Physician: Lashon Caicedo is a 81 y.o. female who presents with concern for CVA. Transfer from [facility] ED, presented with NIH 21. Last known well 1200 3/27/18. Previous records indicate pt found by family w/ AMS, aphasia, right sided deficits. PMH of previous CVA, was on outpatient Xarelto but pt discontinued a few months. Previous dysarthria, no reported residual weakness. Symptom onset has been constant for a time period of 13 hour(s). Severity is described as moderate to severe. Course of her symptoms over time is constant since onset. CT head, CTA head/neck obtain prior to arrival.     Allergies  is allergic to other; ace inhibitors; beta adrenergic blockers; and adhesive tape. Medications  Prior to Admission medications    Medication Sig Start Date End Date Taking? Authorizing Provider   atorvastatin (LIPITOR) 40 MG tablet take 1 tablet by mouth once daily 2/12/19   Jeferson Hollis, DO   XARELTO 20 MG TABS tablet take 1 tablet by mouth once daily 10/12/18   Jeferson Hollis, DO   donepezil (ARICEPT) 10 MG tablet Take 10 mg by mouth daily    Historical Provider, MD [address] [place] [address] Take by mouth daily    Historical Provider, MD   meclizine (ANTIVERT) 25 MG tablet Take 1 tablet by mouth 2 times daily as needed for Dizziness 7/2/17   Bryan Orozco MD   aspirin 81 MG tablet Take 1 tablet by mouth daily.  Medication per j tube 9/2/14   Erlinda Bales    Scheduled Meds:   sodium chloride  1,000 mL Intravenous Once     Continuous Infusions:  PRN Meds:.    Past Medical History   has a past medical history of Anxiety, Blurred vision, bilateral, Bradycardia, Chronic renal insufficiency, Constipation, Depression, Elevated fasting glucose, Encephalomalacia, Fibrocystic breast disease, Frontal headache, Genital atrophy of female, Grief reaction, Hiatal hernia, History of tobacco abuse, Hyperlipidemia, Ileus ([address] Mild mitral regurgitation, Nonischemic cardiomyopathy ([address] Overweight, Paraesophageal hiatal hernia, Sick sinus syndrome ([address] Stroke ([address] Superior mesenteric vein thrombosis, and Tinnitus of right ear. OBJECTIVE  BP (!) 125/54   Pulse 95   Temp 98.7 °F (37.1 °C)   Resp 16   Ht 5' 4\" (1.626 m)   Wt 110 lb (49.9 kg)   SpO2 97%   BMI 18.88 kg/m²     ROS    Unable to obtain, pt aphasic. EXAM:    CONSTITUTIONAL:  Well developed, well nourished, alert, in no acute distress. GCS 15. Nontoxic. Positive aphasia.    HEAD:  normocephalic, atraumatic    EYES:  PERRLA, EOMI.   ENT:  moist mucous membranes, increased airway secretions    NECK:  supple, symmetric, no midline tenderness to palpation    BACK:  without midline tenderness, step-offs or deformities    LUNGS:  Coarse breath sounds bilaterally with transmitted upper airway sounds   CARDIOVASCULAR:  normal s1 / s2   ABDOMEN:  Soft, no rigidity   NEUROLOGIC:  Mental Status:  Non verbal, following commands,awake             Cranial Nerves:    II: PERRLA  III, IV, VI: EOMI  V: Intact muscles of mastication, no facial droop  VII: b/l reaction to visual confrontation    Motor Exam:    Drift:  present - L sided resistance to gravity, R sided flaccid paralysis  Tone:  normal    LUE 3/5, LUE 3/5,  RUE 1/5 RLE 1/5    Sensory:    Touch:    Unable to obtain as pt non-verbal    Deep Tendon Reflexes:    Right Bicep:  2+  Left Bicep:  2+  Right Knee:  2+  Left Knee:  2+    Plantar Response:    Right:  downgoing  Left:  downgoing    Clonus:  absent      Coordination/Dysmetria:  Heel to Shin:  N/A  Finger to Nose:   N/A  Dysdiadochokinesia:  n/A    Gait:  N/A   SKIN:  no rash        INITIAL NIH STROKE SCALE    Time Performed:  0110 AM    Administer stroke scale items in the order listed. Record performance in each category after each subscale exam. Do not go back and change scores. Follow directions provided for each exam technique. Scores should reflect what the patient does, not what the clinician thinks the patient can do. The clinician should record answers while administering the exam and work quickly. Except where indicated, the patient should not be coached (i.e., repeated requests to patient to make a special effort). 1a.  Level of consciousness:  0 - alert; keenly responsive  1b. Level of consciousness questions:  2 - answers neither question correctly  1c. Level of consciousness questions:  0 - performs both tasks correctly  2. Best Gaze:  0 - normal  3. Visual:  0 - no visual loss  4. Facial Palsy:  0 - normal symmetric movement  5a. Motor left arm:  4 - no movement  5b. Motor right arm:  2 - some effort against gravity, limb cannot get to or maintain (if cued) 90 (or 45) degrees, drifts down to bed, but has some effort against gravity   6a. Motor left le - no movement  6b. Motor right leg:  3 - no effort against gravity; leg falls to bed immediately  7. Limb Ataxia:  0 - absent  8. Sensory:  0 - normal; no sensory loss  9. Best Language:  3 - mute, global aphasia; no usable speech or auditory comprehension  10. Dysarthria:  2 - severe; patient speech is so slurred as to be unintelligible in the absence of or our of proportion to any dysphagia, or is mute/anarthric   11. Extinction and Inattention:  0 - no abnormality    TOTAL:  20    Imaging:  CT brain without contrast:  Obtained prior to arrival  CTA head/neck with and without contrast:  Obtained prior to arrival  MRI brain without contrast (limited stroke protocol):  Ordered and pending    Assessment  1. Last Known Well (date and time): 1200 19  2. Candidate for IV tPA therapy     Yes []     No  [x] due to the following exclusion criteria: Outside tPA window.   3. Candidate for Thrombectomy    Yes []      No [] due to the following exclusion criteria: Unknown, awaiting further imaging    Recommendations:  [] General Care Status - prefer 5th floor (5A/5C)  [x] ICU Status - prefer Neuro ICU (5B)  Please use the following admission order set for stroke admission:   [] [id number] - KWASI Intercerebral Hemorrhage Admission   [] [id number] - KWASI Sub Arachnoid Hemorrhage Admission   [] [id number] - KWASI Ischemic Stroke TPA Treatment Focused   [] [id number] - IP Ischemic Stroke ICU Post Alteplase (TPA) Admission    [x] [id number] - GEN Ischemic Stroke Non-Thrombolytic Focussed    MRI head without contrast - limited stroke evaluation  0.9% NS infusion at 75 ml/hour  Recommend -180   mg once today  ASA 81 mg daily following additional imaging and work up. Carotid dopplers  2D cardiac echo  Physical Therapy  Occupational Therapy  Speech Therapy with swallow evaluation  Fasting Lipid panel  Hgb A1c  NIHSS assessment every shift / change in caregiver.      Discussed with Dr. Luis Angel Figueroa MD, EM2  Neuro Critical Care  Pager [address] [place] [address]

## 2019-03-28 NOTE — PROGRESS NOTES
Unable to straight cath patient for urine sample, three attempts made by two RNs unsuccessfully. Dr. Breezy Luevano updated.

## 2019-03-28 NOTE — PROGRESS NOTES
Speech Language Pathology  [address]  Initial Speech/Language/Cognitive Assessment    NAME: Carli Kim  : 1950   MRN: [id number]  ADMISSION DATE: 3/28/2019  ADMITTING DIAGNOSIS: has Elevated fasting glucose; Nonischemic cardiomyopathy ([address] Hyperlipidemia; Anxiety; History of tobacco abuse; Fibrocystic breast disease; Mild mitral regurgitation; Sick sinus syndrome ([address] Automatic implantable cardioverter-defibrillator in situ; Cerebral infarction ([address] Superior mesenteric vein thrombosis; Functional urinary incontinence; Expressive aphasia; and Stroke-like symptom on their problem list.    Date of Eval: 3/28/2019   Evaluating Therapist: Supriya Ivory    RECENT RESULTS  CT OF HEAD/MRI:   Findings:  Large bilateral old frontal lobe infarcts are present.  The      ventricles and cisternal spaces are normal in size and configuration without      midline shift or mass effect.  There is no hemorrhage or hematoma.  No acute      parenchymal abnormalities.             IMPRESSION:      Large old bilateral middle cerebral territory infarcts.  No acute brain      abnormalities.                Primary Complaint: The patient is a 81 y.o. female presented with presents with concern for CVA.  Transfer from [facility] ED, presented with NIH 21.  Last known well 1200 3/27/18.  Previous records indicate pt found by family w/ AMS, aphasia, right sided deficits.  PMH of previous CVA, was on outpatient Xarelto but pt discontinued a few months.  Previous dysarthria, no reported residual weakness. Symptom onset has been constant for a time period of 13 hour(s). Severity is described as moderate to severe.  Course of her symptoms over time is constant since onset. CT head, CTA head/neck obtain prior to arrival.     Pain:  Pain Assessment  Pain Assessment: Faces  Reyna-Moeller Pain Rating: No hurt    Assessment:  Pt presents with mild-severe expressive and receptive aphasia characterized by impaired orientation, as well as difficulty following 1-unit commands, answering yes/no questions, completing automatic speech tasks and responsive naming tasks, and completing sentences. ST to follow up and provide treatment to address noted deficits. Education provided. Further therapy recommended at discharge. Recommendations:  Requires SLP Intervention: Yes  Duration/Frequency of Treatment: 3-5 x week  D/C Recommendations: (therapy)    Plan:   Goals:  Short-term Goals  Goal 1: Pt will follow 1-unit commands with 90% accuracy. Goal 2: Pt will answer yes/no questions with 90% accuracy. Goal 3: Pt will answer biographical info and orientation questions with 90% accuracy. Goal 4: Pt will complete automatic speech tasks with 90% accuracy. Goal 5: Pt will complete sentences with 90% accuracy. Goal 6: Pt will complete responsive naming tasks with 90% accuracy. Patient/family involved in developing goals and treatment plan: Yes    Subjective:   Previous level of function and limitations: Independent  General  Chart Reviewed: Yes  Patient assessed for rehabilitation services?: Yes  Family / Caregiver Present: No  Vision  Vision: Within Functional Limits  Hearing  Hearing: Within functional limits     Objective: Auditory Comprehension  Comprehension: Exceptions  Yes/No Questions: Severe(0/3)  One Step Basic Commands: Mild(2/3)  Common Objects: Severe(0/3)    Expression  Primary Mode of Expression: Verbal    Verbal Expression  Verbal Expression: Exceptions to functional limits  Repetition: Severe(0/3)  Automatic Speech: Severe(#s: 0/10, BRITTANIE: 0/7)  Responsive: Severe(0/3 (when asked \"What do you do with your ears? \" pt pointed to her ear)    Cognition:   Orientation  Overall Orientation Status: Impaired  Orientation Level: Disoriented to person;Disoriented to place; Disoriented to time(Disoriented to , age)    Prognosis:  Speech Therapy Prognosis  Prognosis: Fair  Individuals consulted  Consulted and agree with results and recommendations: RN;Patient    Education:  Patient Education: Yes  Patient Education Response: Needs reinforcement         Therapy Time:   Individual Concurrent Group Co-treatment   Time In [address] [place] [address]         Time Out 9770         Minutes 10           Completed by Richa Medina,  Clinician  Co-signed by Reji Ledesma A.CCC/SLP     3/28/2019 11:58 AM

## 2019-03-28 NOTE — ED NOTES
Bed: 23  Expected date:   Expected time:   Means of arrival:   Comments:  [address], RN  03/28/19 5703

## 2019-03-28 NOTE — PROGRESS NOTES
Pharmacy Note     Renal Dose Adjustment    Lucia Chew is a 81 y.o. female. Pharmacist assessment of renally cleared medications. Recent Labs     03/27/19 2219 03/28/19  0204   BUN 20* PENDING       Recent Labs     03/27/19 2219 03/28/19  0204   CREATININE 1.30* PENDING       CrCl cannot be calculated (This lab value cannot be used to calculate CrCl because it is not a number: PENDING). Estimated CrCl using Ideal Body Weight: 33 mL/min (based on IBW 54.7 kg)    Height:   Ht Readings from Last 1 Encounters:   03/28/19 5' 4\" (1.626 m)     Weight:  Wt Readings from Last 1 Encounters:   03/28/19 110 lb (49.9 kg)       The following medication dose has been adjusted based upon renal function per P&T Guidelines:             Famotidine 40 mg IV once daily changed to famotidine 20 mg IV once daily.

## 2019-03-28 NOTE — PROGRESS NOTES
Occupational Therapy    Occupational Therapy Not Seen Note    DATE: 3/28/2019  Name: Ariane Reno  : 1950  MRN: [id number]    Patient not available for Occupational Therapy due to:    Testing: ECHO in room, pt not consistently following commands at this time. Next Scheduled Treatment: Attempt in pm as time allows or on 3/29 as appropriate.     Electronically signed by Lynn Mark OT on 3/28/2019 at 9:10 AM

## 2019-03-28 NOTE — PROGRESS NOTES
[facility] Wound Ostomy Continence Nurse  Consult Note       NAME:  Daiana Coleman [place] RECORD NUMBER:  [id number]  AGE: 81 y.o. GENDER: female  : 1950  TODAY'S DATE:  3/28/2019    Subjective:     Reason for WOCN Evaluation and Assessment: healing ST 2 coccygeal pressure injury with surrounding slowly blanchable erythema present upon admission. Raz Waite is a 81 y.o. female referred by:   [x] Physician  [] Nursing  [] Other:     Wound Identification:  Wound Type: pressure  Contributing Factors: chronic pressure, decreased mobility, shear force, decreased tissue oxygenation and anticoagulation therapy        PAST MEDICAL HISTORY        Diagnosis Date    Anxiety     Blurred vision, bilateral     mild.  Bradycardia     intolerant of beta blockers, intolerant of ACE inhibitors.  Chronic renal insufficiency     Constipation     Depression     Elevated fasting glucose     Encephalomalacia     parietal.     Fibrocystic breast disease     Frontal headache     bilateral, mild, transient.  Genital atrophy of female     Grief reaction     death of mother.  Hiatal hernia     extremely large.  History of tobacco abuse     now quit.  Hyperlipidemia     Ileus (HCC)     Mild mitral regurgitation     Nonischemic cardiomyopathy (HCC)     ejection fraction less than 30%, implantation of ICD.  Overweight     Paraesophageal hiatal hernia     Sick sinus syndrome ([address]     Stroke ([address] 2006    acute, history of stroke 2001 with no significant neurological deficit, thought to be due to transient atrial fibrillation, right sided hemiparesis, aphasia.  Superior mesenteric vein thrombosis     Tinnitus of right ear     constant, since .         PAST SURGICAL HISTORY    Past Surgical History:   Procedure Laterality Date    CARDIAC CATHETERIZATION  01/10/2008    significant cardiomyopathy, ejection fraction 30%, global hypokinesis, sinus bradycardia into the 40s with incontinence barrier cloths and zinc oxide cream.   Apply zinc oxide cream BID and prn incontinence to the erythematic and non-intact skin on the coccyx.    Moisture wicking under pads vs cloth backed briefs    Specialty Bed Required : Yes   [] Low Air Loss   [x] Pressure Redistribution  [] Fluid Immersion  [] Bariatric  [] Total Pressure Relief  [] Other:     Discharge Plan:  TBD  Patient/Caregiver Teaching:    [] Indicates understanding       [] Needs reinforcement  [] Unsuccessful      [] Verbal Understanding  [] Demonstrated understanding       [x] No evidence of learning  [] Refused teaching         [] N/A       Electronically signed by Waldo Unger RN, CWON on 3/28/2019 at 1:25 PM

## 2019-03-28 NOTE — CONSULTS
Martin Law, Kim Mandel, [address] [facility], [address], & [place]   Urology Consultation      Patient: Juana Graham  MRN: [id number]  YOB: 1950    CHIEF COMPLAINT:  Difficult straight cath, possible fistula    HISTORY OF PRESENT ILLNESS:   The patient is a 81 y.o. female who presents with night nurse unable to straight cath patient to obtain urine for UA/culture. Per day nurse she was able to easily straight cath today without issues. The patient is incontinent in a brief, aphasic admitted for CVA. Per family at bed side this is her third stroke, first in 2006 left her without much speech ability but she was still living independently prior to this admission. Family is not aware of any prior urologic issues, kidney stones, UTIs, hematuria or urologic cancers. Patient's old records, notes and chart reviewed and summarized above. Past Medical History:    Past Medical History:   Diagnosis Date    Anxiety     Blurred vision, bilateral     mild.  Bradycardia     intolerant of beta blockers, intolerant of ACE inhibitors.  Chronic renal insufficiency     Constipation     Depression     Elevated fasting glucose     Encephalomalacia     parietal.     Fibrocystic breast disease     Frontal headache     bilateral, mild, transient.  Genital atrophy of female     Grief reaction     death of mother.  Hiatal hernia     extremely large.  History of tobacco abuse     now quit.  Hyperlipidemia     Ileus (HCC)     Mild mitral regurgitation     Nonischemic cardiomyopathy (HCC)     ejection fraction less than 30%, implantation of ICD.  Overweight     Paraesophageal hiatal hernia     Sick sinus syndrome ([address]     Stroke ([facility] [address] 07/19/2006    acute, history of stroke 07/2001 with no significant neurological deficit, thought to be due to transient atrial fibrillation, right sided hemiparesis, aphasia.      Superior mesenteric vein thrombosis     Tinnitus of right ear     constant, Ricki Santizo, APRN - CNP, 5,000 Units at 19 1406    lidocaine (XYLOCAINE) 2% uro-jet, , Topical, Once, Loli Madrid MD    piperacillin-tazobactam (ZOSYN) 3.375 g in dextrose 5 % 50 mL IVPB (mini-bag), 3.375 g, Intravenous, Q8H, Hilario Luna MD    Allergies: Allergies   Allergen Reactions    Other Shortness Of Breath     Sawdust.       Ace Inhibitors Other (See Comments)     Hypotension, dizziness.  Beta Adrenergic Blockers Other (See Comments)     Hypotension, dizziness.      Adhesive Tape Rash       Social History:   Social History     Socioeconomic History    Marital status:      Spouse name: Not on file    Number of children: Not on file    Years of education: Not on file    Highest education level: Not on file   Occupational History    Not on file   Social Needs    Financial resource strain: Not on file    Food insecurity:     Worry: Not on file     Inability: Not on file    Transportation needs:     Medical: Not on file     Non-medical: Not on file   Tobacco Use    Smoking status: Former Smoker     Packs/day: 0.50     Years: 26.00     Pack years: 13.00     Last attempt to quit: 7/15/2006     Years since quittin.7    Smokeless tobacco: Never Used    Tobacco comment: 1/2 pack a day, started in approximately , quit 2006   Substance and Sexual Activity    Alcohol use: No     Alcohol/week: 0.0 oz    Drug use: No    Sexual activity: Never   Lifestyle    Physical activity:     Days per week: Not on file     Minutes per session: Not on file    Stress: Not on file   Relationships    Social connections:     Talks on phone: Not on file     Gets together: Not on file     Attends Yarsani service: Not on file     Active member of club or organization: Not on file     Attends meetings of clubs or organizations: Not on file     Relationship status: Not on file    Intimate partner violence:     Fear of current or ex partner: Not on file     Emotionally abused: Not on Culture results:  Pending    -----------------------------------------------------------------  Imaging Results:  Ct Head Wo Contrast    Result Date: 3/28/2019  Ordering Provider: Chandra Hollins  Name:   Aly Beltran         : 1950  Unit #:   [id number]         Age:  81  Account #:     [de-identified]      Attending Physician:     Pt Location:   ER   Date of Service:   19    CT HEAD W/O CONTRAST    HISTORY:  Stroke-like symptoms. Study:  Head CT without contrast dated 2019.  2137 hours       TECHNIQUE: Axial images were obtained through the brain without IV   contrast. Images were viewed in soft tissue and bone windows. Individualized   dose optimization techniques were used for the CT scan. Findings:  Large bilateral old frontal lobe infarcts are present. The   ventricles and cisternal spaces are normal in size and configuration without   midline shift or mass effect. There is no hemorrhage or hematoma. No acute   parenchymal abnormalities. IMPRESSION:   Large old bilateral middle cerebral territory infarcts. No acute brain   abnormalities. EXAM/ORDER VERIFICATION: Y  PT/FAMILY VERBALIZES UNDERSTANDING OF EXAM Y  PT SHIELDED N  Is pt pregnant? [facility]   TECH INITIALS MV               COMMENTS       Electronically Signed by:  Miguel Medina M.D.  19 0825  ________________________________________  Miguel Medina M.D. Date Dict:  19 Marilee Ashley M.D. Date Trans: 19 0822 [facility]    [id number]    cc: Carver Russel MD Girtha Hakim D. Esequiel Duverney R D. O.       Xr Chest Portable    Result Date: 3/28/2019  Ordering Provider: Chandra Hollins  Name:   Aly Beltran         : 1950  Unit #:   [id number]         Age:  81  Account #:     [de-identified]      Attending Physician:     Pt Location:   ER   Date of Service:   19    CHEST - PORTABLE HISTORY:  Unresponsive. Left-sided body weakness. History of strokes     Study:  Portable chest dated 03/27/2019.  2219 hours     Findings:  Pacer and defibrillator wires are evident. The stomach is  largely intrathoracic. Eventration of the diaphragm is evident. Basilar  atelectasis is present. The rest of the lungs are clear     IMPRESSION:    Very large hiatal hernia. Basilar atelectasis. EXAM/ORDER VERIFICATION: Y  PT/FAMILY VERBALIZES UNDERSTANDING OF EXAM Y  PT SHIELDED Y  Is pt pregnant? [facility]   TECH INITIALS MV               COMMENTS       Electronically Signed by:  Hillary Taylor M.D.  03/28/19 1133  ________________________________________  Hillary Taylor M.D. Date Dict:  03/28/19 Ame Castillo M.D. Date Trans: 03/28/19 0839 [facility]    [id number]    cc: Ke CANO Xr Chest Portable    Result Date: 3/28/2019  EXAMINATION: SINGLE XRAY VIEW OF THE CHEST 3/28/2019 5:55 am COMPARISON: March 27, 2019. December 25, 2016. HISTORY: ORDERING SYSTEM PROVIDED HISTORY: Concern for aspiriation TECHNOLOGIST PROVIDED HISTORY: Concern for aspiriation FINDINGS: Stable left pectoral trans venous cardiac pacer/ICD. Low left lung volume with elevation of the left hemidiaphragm. Bilateral basilar predominant heterogeneous opacities are similar to the prior study. No definite pleural effusion or pneumothorax. Grossly stable cardiomediastinal silhouette and great vessels. Overall, no significant interval change with redemonstration of low left lung volume with elevation of the left hemidiaphragm and bilateral basilar predominant heterogeneous opacities. Correlate for possible hiatal or diaphragmatic hernia.      Ct Chest Abdomen Pelvis W Contrast    Result Date: 3/28/2019  EXAMINATION: CT OF THE CHEST, ABDOMEN, AND PELVIS WITH CONTRAST 3/28/2019 11:41 am TECHNIQUE: CT of the chest, abdomen and pelvis was performed with the administration of intravenous contrast. Multiplanar reformatted images are provided for review. Dose modulation, iterative reconstruction, and/or weight based adjustment of the mA/kV was utilized to reduce the radiation dose to as low as reasonably achievable. COMPARISON: CT abdomen and pelvis 12/19/2017 HISTORY: ORDERING SYSTEM PROVIDED HISTORY: abdominal distension, hiatal hernia, possible pneumonia TECHNOLOGIST PROVIDED HISTORY: Ordering Physician Provided Reason for Exam: abd pain Acuity: Unknown Type of Exam: Unknown FINDINGS: Chest: Mediastinum: The heart and great vessels appear unchanged. Left subclavian pacing device present. No enlarged or suspicious-appearing lymph nodes. No pericardial effusion. Large left diaphragmatic hernia is noted which extends into the right hemithorax. Lungs/pleura: Scattered airspace disease in the posterior right upper lobe with interstitial and ground-glass nodules are noted. Chronic but more prominent opacities in the visualized left lower lobe are noted as well. This may in part represent compressive atelectasis. Scattered interstitial and ground-glass opacities are also more prominent in the right lung base with findings of compressive atelectasis. Debris is present in the trachea and proximal right mainstem bronchus. No effusion. Soft Tissues/Bones: No acute osseous abnormality identified. Abdomen/Pelvis: Organs: Cholelithiasis without CT evidence for acute cholecystitis. The liver, pancreas, spleen, adrenals and kidneys reveal no acute findings. Probable scarring in the left renal kidney appears unchanged. Right renal cyst. GI/Bowel: Large left diaphragmatic hernia contains the stomach with organoaxial rotation. Loops of colon and small bowel are also present. The pancreas is also herniated. No inflammatory change or evidence for bowel obstruction.   Patulous segment of the sigmoid colon with mild stool burden in the distal colon is a chronic appearance. Pelvis: No acute findings. Excretion of contrast in the bladder is noted. Peritoneum/Retroperitoneum: No free air or free fluid. No lymphadenopathy. The aorta is normal in caliber. Calcified atheromatous plaque is present. Bones/Soft Tissues: Osteopenia. Advanced facet arthropathy in the lower lumbar spine with grade 1 anterolisthesis of L4. Mild disc disease at L1-2. Thoracolumbar levoscoliosis is noted. Moderate degenerative change in the hips. Scattered interstitial and ground-glass opacities are more prominent in the lungs bilaterally. Debris in the trachea and right mainstem bronchus is also noted. Constellation of findings are suspicious for aspiration pneumonitis. Large left diaphragmatic hernia, as seen previously. Organo-axial rotation of the stomach is noted. Chronic findings, as above. Cta Head W Contrast    Result Date: 3/28/2019  [facility]ing [facility]  Name:   Justina Hartman         : 1950  Unit #:   [id number]         Age:  81  Account #:     [de-identified]      Attending Physician:     Pt Location:   ER   Date of Service:   19    CTA HEAD W/ CONTRAST; CTA NECK W/ CONTRAST    HISTORY:  Stroke-like symptoms       Study:  CTA of the head and neck with contrast dated 2019.  2236 hours       TECHNIQUE:   Axial images were obtained through the brain after the administration of 100   ml Visipaque 320. Coronal, sagittal, and 3-D reconstruction imaging was   performed. Individualized dose optimization techniques were utilized. After   this, axial images were obtained from the base of the skull down to the   aortic arch. 100 ml Visipaque 320 was injected. Coronal, sagittal, and 3-D   reconstruction imaging was performed. Individualized dose optimization   techniques were utilized. Findings: The common carotid arteries are normal in caliber and free of   plaque.        The carotid bulbs and internal carotid arteries

## 2019-03-28 NOTE — PLAN OF CARE
Problem: Falls - Risk of:  Goal: Will remain free from falls  Description  Will remain free from falls  Outcome: Met This Shift  Note:   Bed alarm remains on, frequent nursing rounds to patients bedside. Free from falls this shift. Problem: Risk for Impaired Skin Integrity  Goal: Tissue integrity - skin and mucous membranes  Description  Structural intactness and normal physiological function of skin and  mucous membranes. Note:   Patient turned according to policy and protocol, skin assessed for further redness and breakdown.

## 2019-03-28 NOTE — PROCEDURES
I received the correct defibrillator information from [employer] this morning, Medtronic model#[id number] IS NOT MRI CONDITIONAL. Patient cannot have an mri. Implant date 11/25/13. rn aware. I will discontinue the order. ThanksRacheal Perez for the confustion.

## 2019-03-28 NOTE — ED PROVIDER NOTES
on file    Number of children: Not on file    Years of education: Not on file    Highest education level: Not on file   Occupational History    Not on file   Social Needs    Financial resource strain: Not on file    Food insecurity:     Worry: Not on file     Inability: Not on file    Transportation needs:     Medical: Not on file     Non-medical: Not on file   Tobacco Use    Smoking status: Former Smoker     Packs/day: 0.50     Years: 26.00     Pack years: 13.00     Last attempt to quit: 7/15/2006     Years since quittin.7    Smokeless tobacco: Never Used    Tobacco comment: 1/2 pack a day, started in approximately , quit 2006   Substance and Sexual Activity    Alcohol use: No     Alcohol/week: 0.0 oz    Drug use: No    Sexual activity: Never   Lifestyle    Physical activity:     Days per week: Not on file     Minutes per session: Not on file    Stress: Not on file   Relationships    Social connections:     Talks on phone: Not on file     Gets together: Not on file     Attends [employer] service: Not on file     Active member of club or organization: Not on file     Attends meetings of clubs or organizations: Not on file     Relationship status: Not on file    Intimate partner violence:     Fear of current or ex partner: Not on file     Emotionally abused: Not on file     Physically abused: Not on file     Forced sexual activity: Not on file   Other Topics Concern    Not on file   Social History Narrative    Not on file       Family History   Problem Relation Age of Onset    Breast Cancer Mother 62    Cancer Mother         pancreatic    Diabetes Mother         \"borderline\"    High Blood Pressure Mother     Mental Illness Mother     Cancer Father         laryngeal    Diabetes Father     Stroke Maternal Grandmother     Diabetes Maternal Grandmother     Stroke Maternal Grandfather     Diabetes Maternal Grandfather     Stomach Cancer Paternal Grandmother     COPD Other Allergies: Other; Ace inhibitors; Beta adrenergic blockers; and Adhesive tape    Home Medications:  Prior to Admission medications    Medication Sig Start Date End Date Taking? Authorizing Provider   atorvastatin (LIPITOR) 40 MG tablet take 1 tablet by mouth once daily 2/12/19   Jeferson Hollis DO   XARELTO 20 MG TABS tablet take 1 tablet by mouth once daily 10/12/18   Jeferson R Bunny, DO   donepezil (ARICEPT) 10 MG tablet Take 10 mg by mouth daily    Historical Provider, MD [address] [place] [address] Take by mouth daily    Historical Provider, MD   meclizine (ANTIVERT) 25 MG tablet Take 1 tablet by mouth 2 times daily as needed for Dizziness 7/2/17   Demetrio Hughes MD   aspirin 81 MG tablet Take 1 tablet by mouth daily. Medication per j tube 9/2/14   Elke Siegel       REVIEW OF SYSTEMS    (2-9 systems for level 4, 10 or more for level 5)      Review of Systems   Unable to perform ROS: Acuity of condition       PHYSICAL EXAM   (up to 7 for level 4, 8 or more for level 5)      INITIAL VITALS:   BP (!) 117/52   Pulse 81   Temp 98.7 °F (37.1 °C) (Oral)   Resp 18   Ht 5' 4\" (1.626 m)   Wt 114 lb 6.7 oz (51.9 kg)   SpO2 100%   BMI 19.64 kg/m²     Physical Exam   Constitutional: She appears well-developed and well-nourished. HENT:   Head: Normocephalic and atraumatic. Eyes: Pupils are equal, round, and reactive to light. Right eye exhibits no discharge. Left eye exhibits no discharge. Neck: Normal range of motion. Cardiovascular: Normal rate, regular rhythm and normal heart sounds. Exam reveals no gallop and no friction rub. No murmur heard. Pulmonary/Chest: No respiratory distress. She has no wheezes. She has no rales. She exhibits no tenderness. Abdominal: Soft. Bowel sounds are normal. She exhibits no distension and no mass. There is no tenderness. There is no rebound and no guarding. Musculoskeletal: Normal range of motion.  She exhibits no edema, tenderness or deformity. Neurological: She is alert. see NIH scale below. Skin: Skin is warm, dry and intact. No rash noted. She is not diaphoretic. No erythema. No pallor. Psychiatric: She has a normal mood and affect. Her speech is normal and behavior is normal. Judgment and thought content normal. Cognition and memory are normal.       INITIAL NIH STROKE SCALE    Time Performed:  1:07 AM    Administer stroke scale items in the order listed. Record performance in each category after each subscale exam. Do not go back and change scores. Follow directions provided for each exam technique. Scores should reflect what the patient does, not what the clinician thinks the patient can do. The clinician should record answers while administering the exam and work quickly. Except where indicated, the patient should not be coached (i.e., repeated requests to patient to make a special effort). 1a.  Level of consciousness:  AM  1b. Level of consciousness questions:  0 - answers both questions correctly  1c. Level of consciousness questions:  0 - performs both tasks correctly  2. Best Gaze:  0 - normal  3. Visual:  0 - no visual loss  4. Facial Palsy:  0 - normal symmetric movement  5a. Motor left arm:  4 - no movement  5b. Motor right arm:  3 - no effort against gravity, limb falls  6a. Motor left le - no movement  6b. Motor right le - some effort against gravity; leg falls to bed by 5 seconds but has some effort against gravity  7. Limb Ataxia:  0 - absent  8. Sensory:  0 - normal; no sensory loss  9. Best Language:  3 - mute, global aphasia; no usable speech or auditory comprehension  10. Dysarthria:  2 - severe; patient speech is so slurred as to be unintelligible in the absence of or our of proportion to any dysphagia, or is mute/anarthric   11.   Extinction and Inattention:  0 - no abnormality    TOTAL:  18      DIFFERENTIAL  DIAGNOSIS     PLAN (LABS / IMAGING / EKG):  Orders Placed This Encounter   Procedures    Urine Culture    MRSA DNA Probe, Nasal    MRI BRAIN WO CONTRAST    Vascular carotid duplex bilateral    XR CHEST PORTABLE    Urinalysis with Microscopic    STROKE PANEL    Lipid panel - fasting    Blood gas, arterial    Troponin    SPECIMEN REJECTION    PREVIOUS SPECIMEN    Blood Gas, Venous    Diet NPO Effective Now    Vital signs    Telemetry monitoring    Neuro checks    Tobacco cessation education    Swallow screen by nursing before diet and oral medications started.     Stroke education    Notify Physician    Up with assistance    Place intermittent pneumatic compression device    Full Code    Inpatient consult to Neurology    OT eval and treat    PT evaluation and treat    Initiate Oxygen Therapy Protocol    SLP eval and treat    EKG 12 Lead    EKG 12 lead    PATIENT STATUS (FROM ED OR OR/PROCEDURAL) Inpatient       MEDICATIONS ORDERED:  Orders Placed This Encounter   Medications    0.9 % sodium chloride bolus    aspirin suppository 300 mg    sodium chloride flush 0.9 % injection 10 mL    sodium chloride flush 0.9 % injection 10 mL    magnesium hydroxide (MILK OF MAGNESIA) 400 MG/5ML suspension 30 mL    ondansetron (ZOFRAN) injection 4 mg    aspirin EC tablet 81 mg    famotidine (PEPCID) injection 20 mg    acetaminophen (TYLENOL) suppository 650 mg    hydrALAZINE (APRESOLINE) injection 5 mg       DDX: Stroke/ TIA, Vascular, Infectious/inflammatory, Neoplastic/neurological, Drug induced, Iatrogenic, Cardiopulmonary, Autoimmune, Endocrine, Degenerative, Psychogenic/psychiatric, Musculoskeletal      DIAGNOSTIC RESULTS / EMERGENCY DEPARTMENT COURSE / MDM     LABS:  Results for orders placed or performed during the hospital encounter of 03/28/19   STROKE PANEL   Result Value Ref Range    WBC 14.1 (H) 3.5 - 11.3 k/uL    RBC 4.58 3.95 - 5.11 m/uL    Hemoglobin 14.0 11.9 - 15.1 g/dL    Hematocrit 43.2 36.3 - 47.1 %    MCV 94.3 82.6 - 102.9 fL    MCH 30.6 25.2 - 33.5 pg    MCHC 32.4 28.4 - 34.8 g/dL    RDW 13.1 11.8 - 14.4 %    Platelets 194 425 - 716 k/uL    MPV 10.7 8.1 - 13.5 fL    NRBC Automated 0.0 0.0 per 100 WBC    Differential Type NOT REPORTED     Seg Neutrophils 90 (H) 36 - 65 %    Lymphocytes 6 (L) 24 - 43 %    Monocytes 3 3 - 12 %    Eosinophils % 0 (L) 1 - 4 %    Basophils 0 0 - 2 %    Immature Granulocytes 1 (H) 0 %    Segs Absolute 12.79 (H) 1.50 - 8.10 k/uL    Absolute Lymph # 0.81 (L) 1.10 - 3.70 k/uL    Absolute Mono # 0.35 0.10 - 1.20 k/uL    Absolute Eos # <0.03 0.00 - 0.44 k/uL    Basophils # 0.04 0.00 - 0.20 k/uL    Absolute Immature Granulocyte 0.07 0.00 - 0.30 k/uL    WBC Morphology NOT REPORTED     RBC Morphology NOT REPORTED     Platelet Estimate NOT REPORTED     Protime 10.5 9.0 - 12.0 sec    INR 1.0     PTT 23.1 20.5 - 30.5 sec    Myoglobin 579 (H) 25 - 58 ng/mL    Troponin, High Sensitivity PENDING 0 - 14 ng/L    Troponin T NOT REPORTED <0.03 ng/mL    Troponin Interp NOT REPORTED     Glucose PENDING mg/dL    BUN PENDING mg/dL    CREATININE PENDING mg/dL    Bun/Cre Ratio NOT REPORTED 9 - 20    Calcium PENDING mg/dL    Sodium PENDING mmol/L    Potassium PENDING mmol/L    Chloride PENDING mmol/L    CO2 PENDING mmol/L    Anion Gap PENDING mmol/L    GFR Non-African American PENDING >60 mL/min    GFR  PENDING >60 mL/min    GFR Comment          GFR Staging NOT REPORTED     Total CK PENDING U/L    CK-MB 8.8 (H) <5.4 ng/mL    % CKMB PENDING %    CKMB Interpretation PENDING    SPECIMEN REJECTION   Result Value Ref Range    Specimen Source . BLOOD     Ordered Test VBG     Reason for Rejection       Unable to perform testing: Specimen quantity not sufficient.    - NOT REPORTED        RADIOLOGY:     No results found. EKG    EKG: normal EKG, normal sinus rhythm.  T wave inversion in V3 to V6      All EKG's are interpreted by the Emergency Department Physician whoeither signs or Co-signs this chart in the absence of a cardiologist.    [facility]/EMERGENCY DEPARTMENT COURSE:      ED Course as of Mar 28 0254   Thu Mar 28, 2019   0120 Patient is stroke priority, patient has history of old bilateral MCA according to CT imaging. Likely admission to neuro ICU. We'll repeat stroke panel as well as urinalysis given 1 L of fluid. Also repeat a blood gas. At this point patient is protecting her airway we will hold off on intubation. [KW]   6319 Patient transferred to the floor with no acute events while in our emergency department. [KW]      ED Course User Index  [KW] Samra Saavedra DO           PROCEDURES:  None    CONSULTS:  IP CONSULT TO NEUROLOGY    CRITICAL CARE:  None    FINAL IMPRESSION      1.  Stroke-like symptoms          DISPOSITION / PLAN     DISPOSITION Admitted    PATIENT REFERRED TO:  Nicole Atkins DO  [address] [facility] [facility] [place]  [phone]            DISCHARGE MEDICATIONS:  Current Discharge Medication List          Samra Saavedra DO  Emergency Medicine Resident    (Please note that portions of this note were completed with a voicerecognition program.  Efforts were made to edit the dictations but occasionally words are mis-transcribed.)       Samra Saavedra DO  03/28/19 9126

## 2019-03-28 NOTE — PROGRESS NOTES
MRI called unit, per documentation PM is MRI safe; however, [employer] representative needs to be present for scan.   Unable to complete scan until AM.
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
## 2019-03-28 NOTE — PROGRESS NOTES
Smoking Cessation - topics covered   []  Health Risks  []  Benefits of Quitting   []  Smoking Cessation  []  Patient has no history of tobacco use  [x]  Patient is former smoker. Patient quit in 2006. [x]  No need for tobacco cessation education. []  Booklet given  []  Patient verbalizes understanding. []  Patient denies need for tobacco cessation education. []  Unable to meet with patient today. Will follow up as able.   Ambrosio Velazquez  9:36 AM

## 2019-03-28 NOTE — ED PROVIDER NOTES
elevation  twave depression v3=v6, compared with July 2017 [id number]      CRITICAL CARE: There was a high probability of clinically significant/life threatening deterioration in this patient's condition which required my urgent intervention. Total critical care time was 10 minutes. This excludes any time for separately reportable procedures.        [facility], DO  03/28/19 0113       [facility], DO  03/28/19 [facility], DO  03/28/19 0105

## 2019-03-29 NOTE — PLAN OF CARE
Problem: OXYGENATION/RESPIRATORY FUNCTION  Goal: Patient will maintain patent airway  3/29/2019 0140 by Joan Garcia RCP  Outcome: Ongoing     Problem: OXYGENATION/RESPIRATORY FUNCTION  Goal: Patient will achieve/maintain normal respiratory rate/effort  Description  Respiratory rate and effort will be within normal limits for the patient  3/29/2019 0140 by Joan Garcia RCP  Outcome: Ongoing     Problem: MECHANICAL VENTILATION  Goal: Mobility/activity is maintained at optimum level for patient  3/29/2019 0140 by Joan Garcia RCP  Outcome: Ongoing     Problem: MECHANICAL VENTILATION  Goal: Ability to express needs and understand communication  3/29/2019 0140 by Joan Garcia RCP  Outcome: Ongoing     Problem: MECHANICAL VENTILATION  Goal: Patient will maintain patent airway  3/29/2019 0140 by Joan Garcia RCP  Outcome: Ongoing     Problem: MECHANICAL VENTILATION  Goal: Oral health is maintained or improved  3/29/2019 0140 by Joan Garcia RCP  Outcome: Ongoing     Problem: MECHANICAL VENTILATION  Goal: ET tube will be managed safely  3/29/2019 0140 by Joan Garcia RCP  Outcome: Ongoing     Problem: SKIN INTEGRITY  Goal: Skin integrity is maintained or improved  3/29/2019 0140 by Joan Garcia RCP  Outcome: Ongoing

## 2019-03-29 NOTE — PROGRESS NOTES
COMPENSATED  AICD IN SITU   HTN  Multiple CVAs. A/C WITH XARELTO    ECHO 3/28/19  Summary  Left ventricle is normal in size. Global left ventricular systolic function  is moderately reduced. Estimated ejection fraction is 35 % . The apex and apical segments are hypokinetic. Grade I (mild) left ventricular diastolic dysfunction. Pacemaker / ICD lead seen in right atrium and ventricle. Thickened mitral valve leaflets. Mild mitral regurgitation. Mild tricuspid regurgitation. Estimated right ventricular systolic pressure is 25 mmHg. Objective:   Vitals: BP (!) 101/46   Pulse 70   Temp 98.4 °F (36.9 °C) (Axillary)   Resp 15   Ht 5' 4\" (1.626 m)   Wt 108 lb 7.5 oz (49.2 kg)   SpO2 100%   BMI 18.62 kg/m²   General appearance: alert and cooperative with exam  HEENT: Head: Normocephalic, no lesions, without obvious abnormality. Neck: no JVD, trachea midline, no adenopathy  Lungs: Clear to auscultation  Heart: Regular rate and rhythm, s1/s2 auscultated, no murmurs  Abdomen: soft, non-tender, bowel sounds active  Extremities: no edema  Neurologic: not done        Assessment / Acute Cardiac Problems:   1. PAF currently in atrial paced rhythm. 2. Non-ischemic Cardiomyopathy with no signs of volume overload. 3. S/P ICD. 4. Multiple CVAs with non-compliance with anticoagulation. 5. HTN. 6. Hyperlipidemia.     1.     Patient Active Problem List:     Elevated fasting glucose     Nonischemic cardiomyopathy (HCC)     Hyperlipidemia     Anxiety     History of tobacco abuse     Fibrocystic breast disease     Mild mitral regurgitation     Sick sinus syndrome (HCC)     Automatic implantable cardioverter-defibrillator in situ     Cerebral infarction ([address]     Superior mesenteric vein thrombosis     Functional urinary incontinence     Expressive aphasia     Stroke-like symptoms     Acute encephalopathy     History of cerebral infarction     History of implantable cardioverter-defibrillator (ICD) placement     Hiatal hernia     Chronic atrial fibrillation ([facility] [address]      Plan of Treatment:   1. PAF. xarelto on hold til Monday per neuro. 2. ECHO reviewed  3. Trops elevated but trending down. New onset lateral T wave changes on EKG. Will start heparin gtt when ok with neuro. On SQ heparin currently. Continue ASA/Statin. Will consider ischemia workup once acute issues resolve. 4. Goal SBP per neuro 110-180. No medications currently.     5. Keep K > 4     Electronically signed by ANASTACIO Brooke CNP on 3/29/2019 at 10:17 AM  [address]  [phone]

## 2019-03-29 NOTE — PROGRESS NOTES
Physical Therapy  DATE: 3/29/2019    NAME: Saba Brooks  MRN: [id number]   : 1950    Patient not seen this date for Physical Therapy due to:  [] Blood transfusion in progress  [] Hemodialysis  []  Patient Declined  [] Spine Precautions   [] Strict Bedrest  [] Surgery/ Procedure  [] Testing      [x] Other: intubated 9pm 3/28. On LTME. PROM not indicated at this time. Will follow to initiate as appropriate. [] PT being discontinued at this time. Patient independent. No further needs. [] PT being discontinued at this time as the patient has been transferred to palliative care. No further needs.     Ann Naqvi, PT

## 2019-03-29 NOTE — PROCEDURES
LONG-TERM EEG-VIDEO MONITORING   CLINICAL NEUROPHYSIOLOGY LABORATORY  DEPARTMENT OF NEUROLOGY  [address] [place] [address]    Patient: Kayla Hunt  Age: 81 y.o. MRN: [id number]  Dates of recording: 3/29/2019     Referring Physician: No ref. provider found  History: The patient is a 81 y.o. female who presented breakthrough seizure/encephalopathy . This long-term video-EEG monitoring study was performed in order to determine the nature of the patient's clinical events. The patient is on neuroactive medications.    Kayla Hunt   Current Facility-Administered Medications   Medication Dose Route Frequency Provider Last Rate Last Dose    sodium chloride flush 0.9 % injection 10 mL  10 mL Intravenous 2 times per day Jewell Sprague MD   10 mL at 03/28/19 2240    sodium chloride flush 0.9 % injection 10 mL  10 mL Intravenous PRN Jewell Sprague MD        magnesium hydroxide (MILK OF MAGNESIA) 400 MG/5ML suspension 30 mL  30 mL Oral Daily PRN Jewell Sprague MD        ondansetron [facility]) injection 4 mg  4 mg Intravenous Q6H PRN Jewell Sprague MD        aspirin EC tablet 81 mg  81 mg Oral Daily Jewell Sprague MD        famotidine (PEPCID) injection 20 mg  20 mg Intravenous Daily Jewell Sprague MD   20 mg at 03/28/19 9699    acetaminophen (TYLENOL) suppository 650 mg  650 mg Rectal Q4H PRN Jewell Sprague MD        hydrALAZINE (APRESOLINE) injection 5 mg  5 mg Intravenous Q6H PRN Jewell Sprague MD        0.9 % sodium chloride infusion   Intravenous Continuous Tamir Haile MD 75 mL/hr at 03/28/19 2235      azithromycin (ZITHROMAX) 500 mg in dextrose 5 % 250 mL IVPB  500 mg Intravenous Q24H Para Pink, APRN - CNP   Stopped at 03/28/19 1506    heparin (porcine) injection 5,000 Units  5,000 Units Subcutaneous 3 times per day Para Pink, APRN - CNP   5,000 Units at 03/29/19 0527    lidocaine (XYLOCAINE) 2% uro-jet   Topical Once Gilbert Baron MD       [facility] piperacillin-tazobactam (ZOSYN) 3.375 g in dextrose 5 % 50 mL IVPB (mini-bag)  3.375 g Intravenous Q8H Hilario Luna MD   Stopped at 03/29/19 0244    propofol 1000 MG/100ML injection  10 mcg/kg/min Intravenous Titrated Willy Castillo MD 3.1 mL/hr at 03/29/19 0305 10 mcg/kg/min at 03/29/19 1574     Technical Description: This is a 21 channel digital EEG recording with time-locked video. Electrodes were placed in accordance with the 10-20 International System of Electrode Placement. Single lead EKG monitoring was included. Baseline EEG Recording:  A formal baseline EEG recording was not obtained. Day 1 - 3/28/2019, starting at 15:38    Interictal EEG Samples: The background activity consisted of 7-8 Hz of polymorphic theta activity. There were frequent intrusions of 3-4 Hz of polymorphic delta activity left frontotemporal region. The background was poorly organized. During behavioral sleep, rudimentary sleep spindles and vertex sharp waves were seen. The EKG channel revealed no abnormalities. Ictal EEG Recording / Patient Events: During this period the patient had no events or seizures. Summary: During this day of recording no events were recorded. The interictal EEG was abnormal due to disorganized and mild slowing suggestive of mild encephalopathy. Frequent left frontotemporal slowing suggested of underlying structural defect. Monitoring was continued in order to record the patients typical events. The EKG channel revealed no abnormalities. Day 2 - 3/29/2019, reviewed through 7:30 am    Interictal EEG Samples: Interictal EEG was unchanged from yesterday. Ictal EEG Recording / Patient Events: During this period the patient had no events or seizures. Summary: During this day of recording no events were recorded. The interictal EEG was abnormal due to disorganized and mild slowing suggestive of mild encephalopathy.   Frequent left frontotemporal slowing suggested of underlying structural defect. Monitoring was continued in order to record the patients typical events. The EKG channel revealed no abnormalities. Areli Rivera MD  Neurology Attending  [place] Board of Psychiatry and Neurology  Diplomate, American Board of Clinical Neurophysiology  Diplomate, American Board of Epilepsy       Please note this is a preliminary report and updated on a daily basis. The final report will have a summary of behavior and electrographic findings with clinical correlation.

## 2019-03-29 NOTE — PROGRESS NOTES
[facility]  Speech Language Pathology    Date: 3/29/2019  Patient Name: Saba Brooks  YOB: 1950   AGE: 81 y.o. MRN: [id number]        Patient Not Available for Speech Therapy     Due to:  [] Testing  [] Hemodialysis  [] Cancelled by RN  [] Surgery   [x] Intubation/Sedation/Pain Medication  [] Medical instability  [] Other:    Next scheduled treatment: 03/30/2019     Completed by Félix Vides,  Clinician  Co-signed by Floresita Palma A.CCC/SLP

## 2019-03-29 NOTE — PLAN OF CARE
Problem: HEMODYNAMIC STATUS  Goal: Patient has stable vital signs and fluid balance  3/28/2019 2355 by Adelaide Sheehan RN  Outcome: Ongoing  3/28/2019 1842 by Kelly Canela RN  Outcome: Ongoing  Note:   Patient with stable vital signs and fluid balance. 3/28/2019 1841 by Kelly Canela RN  Outcome: Ongoing  Note:   Patient with stable vital signs and fluid balance. Problem: ACTIVITY INTOLERANCE/IMPAIRED MOBILITY  Goal: Mobility/activity is maintained at optimum level for patient  Outcome: Ongoing     Problem: COMMUNICATION IMPAIRMENT  Goal: Ability to express needs and understand communication  Outcome: Ongoing     Problem: Risk for Impaired Skin Integrity  Goal: Tissue integrity - skin and mucous membranes  Description  Structural intactness and normal physiological function of skin and  mucous membranes. 3/28/2019 2355 by Adelaide Sheehan RN  Outcome: Ongoing  3/28/2019 1842 by Kelly Canela RN  Outcome: Ongoing  Note:   No new skin breakdown noted. Patient repositioned q2h.  3/28/2019 1841 by Kelly Canela RN  Outcome: Ongoing  Note:   No new skin breakdown noted. Patient repositioned q2h. Problem: Falls - Risk of:  Goal: Will remain free from falls  Description  Will remain free from falls  3/28/2019 2355 by Adelaide Sheehan RN  Outcome: Ongoing  3/28/2019 1842 by Kelly Canela RN  Outcome: Ongoing  Note:   Patient remains free from falls this shift. 3/28/2019 1841 by Kelly Canela RN  Outcome: Ongoing  Note:   Patient remains free from falls this shift.    Goal: Absence of physical injury  Description  Absence of physical injury  Outcome: Ongoing     Problem: OXYGENATION/RESPIRATORY FUNCTION  Goal: Patient will maintain patent airway  Outcome: Ongoing  Goal: Patient will achieve/maintain normal respiratory rate/effort  Description  Respiratory rate and effort will be within normal limits for the patient  Outcome: Ongoing     Problem: MECHANICAL VENTILATION  Goal: Mobility/activity is maintained at optimum level for patient  Outcome: Ongoing  Goal: Ability to express needs and understand communication  Outcome: Ongoing  Goal: Patient will maintain patent airway  Outcome: Ongoing  Goal: Oral health is maintained or improved  Outcome: Ongoing  Goal: ET tube will be managed safely  Outcome: Ongoing     Problem: SKIN INTEGRITY  Goal: Skin integrity is maintained or improved  Outcome: Ongoing     Problem: Restraint Use - Nonviolent/Non-Self-Destructive Behavior:  Goal: Absence of restraint indications  Description  Absence of restraint indications  Outcome: Ongoing  Goal: Absence of restraint-related injury  Description  Absence of restraint-related injury  Outcome: Ongoing

## 2019-03-29 NOTE — PROGRESS NOTES
20: 36-VS HR 81 R 16 O2 96% 4LNC  20:38-Timeout Dr. Mckenzie Zaragoza @ bedside  20:39-Etamidate 15 mg IV push given and flushed  20:40-Succinate 50mg IV push given and flushed HR 96, 96% pulse Ox 120/51. 1st attempt to intubate with glidescope. 20:41-25 @ lips 7.5, positive color change, fogging in tube, bilat breath sounds.  significant

## 2019-03-29 NOTE — PROGRESS NOTES
Nutrition Assessment    Type and Reason for Visit: Initial, Consult    Nutrition Recommendations: Recommend Vital AF (semielemental) tube feed goal of 40 ml per hour to provide 1152 kcal, 72 g protein    Nutrition Assessment: Consulted for initiation of tube feed. NG has been placed    Malnutrition Assessment:  · Malnutrition Status: Insufficient data  · Context: Acute illness or injury  · Findings of the 6 clinical characteristics of malnutrition (Minimum of 2 out of 6 clinical characteristics is required to make the diagnosis of moderate or severe Protein Calorie Malnutrition based on AND/ASPEN Guidelines):  1. Energy Intake-Unable to assess,      2. Weight Loss-Unable to assess,    3. Fat Loss-Unable to assess,    4. Muscle Loss-Unable to assess,    5. Fluid Accumulation-No significant fluid accumulation,    6.   Strength-Not measured    Nutrition Risk Level: High    Nutrient Needs:  · Estimated Daily Total Kcal: 25-28 kcal/zf=4829-3779 kcal  · Estimated Daily Protein (g): 1.2-1.4 g/kg=65-75 g  Nutrition Diagnosis:   · Problem: Inadequate oral intake  · Etiology: related to Impaired respiratory function-inability to consume food     Signs and symptoms:  as evidenced by Intubation    Objective Information:  · Wound Type: Stage II, Pressure Ulcer  · Current Nutrition Therapies:  · Oral Diet Orders: NPO   · Anthropometric Measures:  · Ht: 5' 4\" (162.6 cm)   · Current Body Wt: 108 lb (49 kg)  · Ideal Body Wt: 120 lb (54.4 kg), % Ideal Body 90%  · BMI Classification: BMI 18.5 - 24.9 Normal Weight    Nutrition Interventions:   Start Tube Feeding  Education not appropriate at this time    Nutrition Evaluation:   · Evaluation: Goals set   · Goals: meet more than 75% of nutrition needs    · Monitoring: TF Intake, TF Tolerance, Monitor Bowel Function      Electronically signed by Raj Franks RD, LD on 3/29/19 at 2:20 PM    Contact Number: [phone]

## 2019-03-29 NOTE — PROGRESS NOTES
Charting intubations and reintubations    ETT Size  7.5  ETT Placement  25 @ lips  ETT secured with  [place]    Tube placement verified by:  Auscultation  yes  Positive color change on end tidal CO2 detector   Reason for intubation  Airway protection/ secretion managment      -- Notify charge therapist regarding all intubations, extubations, reintubations and self extubations    Mamta Vasques  9:00 PM

## 2019-03-29 NOTE — PLAN OF CARE
Problem: OXYGENATION/RESPIRATORY FUNCTION  Goal: Patient will maintain patent airway  3/29/2019 0817 by CADE McdanielP  Outcome: Ongoing  3/29/2019 0140 by CADE PachecoP  Outcome: Ongoing  3/28/2019 2355 by Alina Spivey RN  Outcome: Ongoing     Problem: OXYGENATION/RESPIRATORY FUNCTION  Goal: Patient will achieve/maintain normal respiratory rate/effort  Description  Respiratory rate and effort will be within normal limits for the patient  3/29/2019 0817 by CADE McdanielP  Outcome: Ongoing  3/29/2019 0140 by Jasson De Los Santos RCP  Outcome: Ongoing  3/28/2019 2355 by Alina Spivey RN  Outcome: Ongoing     Problem: MECHANICAL VENTILATION  Goal: Mobility/activity is maintained at optimum level for patient  3/29/2019 0817 by Edith Macdonald RCP  Outcome: Ongoing  3/29/2019 0140 by CADE PachecoP  Outcome: Ongoing  3/28/2019 2355 by Alina Spivey RN  Outcome: Ongoing     Problem: MECHANICAL VENTILATION  Goal: Ability to express needs and understand communication  3/29/2019 0817 by Edith Macdonald RCP  Outcome: Ongoing  3/29/2019 0140 by CADE PachecoP  Outcome: Ongoing  3/28/2019 2355 by Alina Spivey RN  Outcome: Ongoing     Problem: MECHANICAL VENTILATION  Goal: Patient will maintain patent airway  3/29/2019 0817 by Edith Macdonald RCP  Outcome: Ongoing  3/29/2019 0140 by CADE PachecoP  Outcome: Ongoing  3/28/2019 2355 by Alina Spivey RN  Outcome: Ongoing     Problem: MECHANICAL VENTILATION  Goal: Oral health is maintained or improved  3/29/2019 0817 by Edith Macdonald RCP  Outcome: Ongoing  3/29/2019 0140 by CADE PachecoP  Outcome: Ongoing  3/28/2019 2355 by Alina Spivey RN  Outcome: Ongoing

## 2019-03-29 NOTE — CARE COORDINATION
Received phone call from [facility] at [facility] . Phone [phone]  Pt currently resides in an independent living apt there but may need their SNF facility at discharge. Pt intubated last night. NG tube placed. [facility] following and will re-evaluate Monday if SNF appropriate.

## 2019-03-29 NOTE — PROGRESS NOTES
Daily Progress Note  Neuro Critical Care    Patient Name: Sola Mejia  Patient : 1950  Room/Bed: [facility]/4709-17  Allergies: Allergies   Allergen Reactions    Other Shortness Of Breath     Sawdust.       Ace Inhibitors Other (See Comments)     Hypotension, dizziness.  Beta Adrenergic Blockers Other (See Comments)     Hypotension, dizziness.  Adhesive Tape Rash     Problem List:   Patient Active Problem List   Diagnosis    Elevated fasting glucose    Nonischemic cardiomyopathy (HCC)    Hyperlipidemia    Anxiety    History of tobacco abuse    Fibrocystic breast disease    Mild mitral regurgitation    Sick sinus syndrome (HCC)    Automatic implantable cardioverter-defibrillator in situ    Cerebral infarction ([address]    Superior mesenteric vein thrombosis    Functional urinary incontinence    Expressive aphasia    Stroke-like symptoms    Acute encephalopathy    History of cerebral infarction    History of implantable cardioverter-defibrillator (ICD) placement    Hiatal hernia    Chronic atrial fibrillation ([address]         INTERVAL HISTORY    Initial Presentation (Admitted 3/28): The patient is a 81 y.o. female presented with presents with concern for CVA.  Transfer from [facility] ED, presented with NIH 21.  Last known well 1200 3/27/18.  Previous records indicate pt found by family w/ AMS, aphasia, right sided deficits.  PMH of previous CVA, afib as on outpatient Xarelto but pt discontinued a few months.  Previous dysarthria, no reported residual weakness. Symptom onset has been constant for a time period of 13 hour(s). Severity is described as moderate to severe. Course of her symptoms over time is constant since onset. CT head, CTA head/neck obtain prior to arrival.     Hospital Course:    3/28: intubated due to impending respiratory failure. Last 24h: Intubated overnight for impending respiratory Failure.  Sedated on propofol, held this am, spo movements right side body only. Copious secreations, remains on zosyn, zithromax. Tmax last 24 37.7.        CURRENT MEDICATIONS:  SCHEDULED MEDICATIONS:   sodium chloride flush  10 mL Intravenous 2 times per day    aspirin  81 mg Oral Daily    famotidine (PEPCID) injection  20 mg Intravenous Daily    azithromycin  500 mg Intravenous Q24H    heparin (porcine)  5,000 Units Subcutaneous 3 times per day    lidocaine   Topical Once    piperacillin-tazobactam  3.375 g Intravenous Q8H     CONTINUOUS INFUSIONS:   sodium chloride 75 mL/hr at 19 2235    propofol 10 mcg/kg/min (19 0610)     PRN MEDICATIONS:   sodium chloride flush, magnesium hydroxide, ondansetron, acetaminophen, hydrALAZINE    VITALS:  Temperature Range: Temp: 98.3 °F (36.8 °C) Temp  Av.9 °F (37.2 °C)  Min: 98.3 °F (36.8 °C)  Max: 99.9 °F (37.7 °C)  BP Range: Systolic (10TUX), CPN:121 , Min:89 , MFP:130     Diastolic (99VGQ), XLW:34, Min:24, Max:119    Pulse Range: Pulse  Av.3  Min: 70  Max: 85  Respiration Range: Resp  Av.5  Min: 14  Max: 27  Current Pulse Ox: SpO2: 99 %  24HR Pulse Ox Range: SpO2  Av.2 %  Min: 93 %  Max: 100 %  Patient Vitals for the past 12 hrs:   BP Temp Temp src Pulse Resp SpO2 Height Weight   19 0605 (!) 96/24 -- -- 71 14 99 % -- --   19 0530 (!) 97/40 -- -- 71 14 100 % -- --   19 0405 (!) 106/47 -- -- 71 15 100 % 5' 4\" (1.626 m) 108 lb 7.5 oz (49.2 kg)   19 0326 -- -- -- 71 15 99 % -- --   19 0305 (!) 95/59 98.3 °F (36.8 °C) Axillary 73 14 98 % -- --   19 0205 (!) 105/48 -- -- 70 15 99 % -- --   19 0115 95/68 -- -- 76 15 99 % -- --   19 0015 (!) 112/47 98.6 °F (37 °C) Axillary 82 17 99 % -- --   19 (!) 117/53 99 °F (37.2 °C) Axillary 82 24 99 % -- --   19 -- -- -- 80 17 96 % -- --   195 104/72 -- -- 74 16 99 % -- --   19 (!) 103/41 -- -- 74 16 98 % -- --   19 (!) 89/41 -- -- 74 16 93 % -- --   19 (!) 111/50 -- -- 70 16 96 % -- --   03/28/19 2109 -- -- -- 72 17 98 % -- --   03/28/19 2100 (!) 155/119 -- -- 73 17 98 % -- --   03/28/19 2045 (!) 158/119 -- -- 80 19 99 % -- --   03/28/19 2030 113/72 -- -- 83 19 96 % -- --   03/28/19 2004 -- -- -- 85 -- -- -- --   03/28/19 2003 (!) 107/52 98.4 °F (36.9 °C) Axillary 85 20 95 % -- --     Estimated body mass index is 18.62 kg/m² as calculated from the following:    Height as of this encounter: 5' 4\" (1.626 m). Weight as of this encounter: 108 lb 7.5 oz (49.2 kg).  []<16 Severe malnutrition  []16-16.99 Moderate malnutrition  []17-18.49 Mild malnutrition  [x]18.5-24.9 Normal  []25-29.9 Overweight (not obese)  []30-34.9 Obese class 1 (Low Risk)  []35-39.9 Obese class 2 (Moderate Risk)  []?40 Obese class 3 (High Risk)    RECENT LABS:   Lab Results   Component Value Date    WBC 9.5 03/29/2019    HGB 12.0 03/29/2019    HCT 38.0 03/29/2019    PLT See Reflexed IPF Result 03/29/2019    CHOL 111 03/29/2019    TRIG 69 03/29/2019    HDL 57 03/29/2019    ALT 14 03/27/2019    AST 28 03/27/2019     03/29/2019    K 3.5 (L) 03/29/2019     (H) 03/29/2019    CREATININE 0.74 03/29/2019    BUN 11 03/29/2019    CO2 18 (L) 03/29/2019    INR 1.0 03/28/2019    LABA1C 5.3 02/28/2017     24 HOUR INTAKE/OUTPUT:    Intake/Output Summary (Last 24 hours) at 3/29/2019 9911  Last data filed at 3/29/2019 7666  Gross per 24 hour   Intake 1967.47 ml   Output 400 ml   Net 1567.47 ml       RADIOLOGY:   Ct Head Wo Contrast    Result Date: 3/29/2019  EXAMINATION: CT OF THE HEAD WITHOUT CONTRAST  3/29/2019 4:46 am TECHNIQUE: CT of the head was performed without the administration of intravenous contrast. Dose modulation, iterative reconstruction, and/or weight based adjustment of the mA/kV was utilized to reduce the radiation dose to as low as reasonably achievable. COMPARISON: CT head done September 28, 2017.  HISTORY: ORDERING SYSTEM PROVIDED HISTORY: left hemiparesis possibly acute, eval for acute [id number]      Attending Physician:     Pt Location:   ER   Date of Service:   03/27/19    CT HEAD W/O CONTRAST    HISTORY:  Stroke-like symptoms. Study:  Head CT without contrast dated 03/27/2019.  2137 hours       TECHNIQUE: Axial images were obtained through the brain without IV   contrast. Images were viewed in soft tissue and bone windows. Individualized   dose optimization techniques were used for the CT scan. Findings:  Large bilateral old frontal lobe infarcts are present. The   ventricles and cisternal spaces are normal in size and configuration without   midline shift or mass effect. There is no hemorrhage or hematoma. No acute   parenchymal abnormalities. IMPRESSION:   Large old bilateral middle cerebral territory infarcts. No acute brain   abnormalities. EXAM/ORDER VERIFICATION: Y  PT/FAMILY VERBALIZES UNDERSTANDING OF EXAM Y  PT SHIELDED N  Is pt pregnant? LMP   TECH INITIALS MV               COMMENTS       Electronically Signed by:  Uriel Gallegos M.D.  03/28/19 0825  ________________________________________  Uriel Gallegos M.D. Date Dict:  03/28/19 Anne Marie Rios M.D. Date Trans: 03/28/19 0822 [facility]    [id number]    cc: Gaylen Curie MD Kern Greenspan D. Rex Brunner R D. O. Xr Chest Portable    Result Date: 3/28/2019  EXAMINATION: SINGLE XRAY VIEW OF THE CHEST 3/28/2019 9:05 pm COMPARISON: March 28 HISTORY: ORDERING SYSTEM PROVIDED HISTORY: s/p intubation TECHNOLOGIST PROVIDED HISTORY: s/p intubation FINDINGS: ET tube projects over the mid trachea. Enteric tube terminates left of midline projecting over lobular high density which is likely contrast in a hiatal hernia or sequela of diaphragmatic hernia. Basilar airspace disease on the left is noted with poor visualization of the hemidiaphragm. ET tube as described. Probable left basilar airspace disease.  Hiatal hernia versus diaphragmatic hernia which obscures the left hemidiaphragm and left lung base     Xr Chest Portable    Result Date: 3/28/2019  Ordering Provider: Marilyn Felty [facility]  Name:   Prince Mitchell         : 1950  Unit #:   [id number]         Age:  81  Account #:     [de-identified]      Attending Physician:     Pt Location:   ER   Date of Service:   19    CHEST - PORTABLE    HISTORY:  Unresponsive. Left-sided body weakness. History of strokes     Study:  Portable chest dated 2019.  2219 hours     Findings:  Pacer and defibrillator wires are evident. The stomach is  largely intrathoracic. Eventration of the diaphragm is evident. Basilar  atelectasis is present. The rest of the lungs are clear     IMPRESSION:    Very large hiatal hernia. Basilar atelectasis. EXAM/ORDER VERIFICATION: Y  PT/FAMILY VERBALIZES UNDERSTANDING OF EXAM Y  PT SHIELDED Y  Is pt pregnant? LMP   TECH INITIALS MV               COMMENTS       Electronically Signed by:  Angela Hahn M.D.  19 1133  ________________________________________  Angela Hahn M.D. Date Dict:  19 Yojana Watts M.D. Date Trans: 19 0839 [facility]    [id number]    cc: Janice CANO Xr Chest Portable    Result Date: 3/28/2019  EXAMINATION: SINGLE XRAY VIEW OF THE CHEST 3/28/2019 5:55 am COMPARISON: 2019. 2016. HISTORY: ORDERING SYSTEM PROVIDED HISTORY: Concern for aspiriation TECHNOLOGIST PROVIDED HISTORY: Concern for aspiriation FINDINGS: Stable left pectoral trans venous cardiac pacer/ICD. Low left lung volume with elevation of the left hemidiaphragm. Bilateral basilar predominant heterogeneous opacities are similar to the prior study. No definite pleural effusion or pneumothorax. Grossly stable cardiomediastinal silhouette and great vessels.      Overall, no significant interval change with redemonstration of low left lung volume with elevation of the left hemidiaphragm and bilateral basilar predominant heterogeneous opacities. Correlate for possible hiatal or diaphragmatic hernia. Vascular Carotid Duplex Bilateral    Result Date: 3/28/2019    [facility]  Vascular Carotid Procedure   Patient Name  DRAKE (CREEK) [facility]  Date of Study                J   Date of Birth 1950  Gender                  Female   Age           81 year(s)  Race                       Room Number   6662   Corporate ID  [id number]  #   Patient Lisa  [de-identified]  #   MR #          [id number]     Sonographer             Violetta Dural   Accession #   [id number]   Interpreting Physician  Yaniv Grant   Referring                 Referring Physician     Noelle Fernandez MD  Nurse  Practitioner  Procedure Type of Study:   Cerebral: Carotid, Carotid Scan Bilateral.  Indications for Study:Carotid stenosis. Blood Pressure:Left arm 99/56 mmHg. Patient Status: In Patient. Limitation reason:BP not taken on right arm due to I.V. .  Conclusions   Summary   Mild 16-49% stenosis of the right internal carotid artery. Hemodynamically normal left carotid artery scan. Patent vertebral arteries bilaterally with antegrade flow. Signature   ----------------------------------------------------------------  Electronically signed by Angely Kaye(Sonographer) on  03/28/2019 11:40 AM  ----------------------------------------------------------------   ----------------------------------------------------------------  Electronically signed by Waleska Allen(Interpreting  physician) on 03/28/2019 09:23 PM  ----------------------------------------------------------------  Findings:   Right Impression:                        Left Impression:  The common carotid artery has a smooth   The common carotid artery has a  homogeneous plaque causing a <50%        smooth homogeneous plaque causing  stenosis. a <50% stenosis.    The carotid bulb has an irregular        The carotid bulb has a smooth  heterogeneous plaque causing a <50%      homogeneous plaque causing a <50%  stenosis. stenosis. The internal carotid artery has a smooth The internal carotid artery is  homogeneous plaque causing a <50%        normal.  stenosis based on velocities. The external carotid artery has a  The external carotid artery has a smooth smooth homogeneous plaque causing  homogeneous plaque causing a <50%        a <50% stenosis. stenosis. The vertebral artery is patent  The vertebral artery is patent with      with antegrade flow. antegrade flow. Allergies   - Allergy: Ace Inhibitors(Drug). - Allergy:Tape(Miscellaneous). - Allergy:*Unlisted(Drug). Comments:beta adrenergic blockers Velocities are measured in cm/s ; Diameters are measured in cm Carotid Right Measurements +------------+-------+-------+--------+-------+------------+---------------+ ! Location    ! PSV    ! EDV    ! Angle   ! RI     !%Stenosis   ! Tortuosity     ! +------------+-------+-------+--------+-------+------------+---------------+ ! Prox CCA    !72.5   !14.9   !60      !0.79   !            !               ! +------------+-------+-------+--------+-------+------------+---------------+ ! Mid CCA     !73.3   !14.1   ! 60      !0.81   !            !               ! +------------+-------+-------+--------+-------+------------+---------------+ ! Dist CCA    !54.1   !11.4   !60      !0.79   !            !               ! +------------+-------+-------+--------+-------+------------+---------------+ ! Bulb        !51.7   !14.1   ! 60      !0.73   !            !               ! +------------+-------+-------+--------+-------+------------+---------------+ ! Prox ICA    !38.1   !8.87   !60      !0.77   !            !               ! +------------+-------+-------+--------+-------+------------+---------------+ ! Dist ICA    !41     !9.05   !60      !0.78   !            !               ! +------------+-------+-------+--------+-------+------------+---------------+ ! Prox ECA    !71.3   !9.8    ! 60      !0.86   !            !               ! +------------+-------+-------+--------+-------+------------+---------------+ ! Vertebral   !39.9   !4.74   !60      !0.88   !            !               ! +------------+-------+-------+--------+-------+------------+---------------+   - There is antegrade vertebral flow noted on the left side. - Additional Measurements:ICAPSV/CCAPSV 0.65. ICAEDV/CCAEDV 1.55. Ir Place Ng Tube By Dr Gail Najera    Result Date: 3/28/2019  PROCEDURE: XR PLACE NASOGASTRIC TUBE PHYS 3/28/2019 HISTORY: ORDERING SYSTEM PROVIDED HISTORY: patient has hiatal hernia TECHNOLOGIST PROVIDED HISTORY: patient has hiatal hernia TECHNIQUE: With the patient supine, an 18 Romansh sump tube NG was inserted via the left nares, and manipulated into the intrathoracic stomach, verified with a small contrast injection. CONTRAST: Conray 43-10 mL used. SEDATION: None FLUOROSCOPY DOSE AND TYPE OR TIME AND EXPOSURES: Fluoroscopy time-2.3 minutes D AP-183 FINDINGS: Images show various stages of NG tube placement; following contrast injection, the tip and side hole are located within the stomach with contrast demonstrating the intrathoracic position. Successful fluoroscopy guided NG tube placement. NG tube is ready for use at this time. Ct Chest Abdomen Pelvis W Contrast    Result Date: 3/28/2019  EXAMINATION: CT OF THE CHEST, ABDOMEN, AND PELVIS WITH CONTRAST 3/28/2019 11:41 am TECHNIQUE: CT of the chest, abdomen and pelvis was performed with the administration of intravenous contrast. Multiplanar reformatted images are provided for review.  Dose modulation, iterative reconstruction, and/or weight based adjustment of the mA/kV was utilized to reduce the radiation dose to as low as reasonably achievable. COMPARISON: CT abdomen and pelvis 12/19/2017 HISTORY: ORDERING SYSTEM PROVIDED HISTORY: abdominal distension, hiatal hernia, possible pneumonia TECHNOLOGIST PROVIDED HISTORY: Ordering Physician Provided Reason for Exam: abd pain Acuity: Unknown Type of Exam: Unknown FINDINGS: Chest: Mediastinum: The heart and great vessels appear unchanged. Left subclavian pacing device present. No enlarged or suspicious-appearing lymph nodes. No pericardial effusion. Large left diaphragmatic hernia is noted which extends into the right hemithorax. Lungs/pleura: Scattered airspace disease in the posterior right upper lobe with interstitial and ground-glass nodules are noted. Chronic but more prominent opacities in the visualized left lower lobe are noted as well. This may in part represent compressive atelectasis. Scattered interstitial and ground-glass opacities are also more prominent in the right lung base with findings of compressive atelectasis. Debris is present in the trachea and proximal right mainstem bronchus. No effusion. Soft Tissues/Bones: No acute osseous abnormality identified. Abdomen/Pelvis: Organs: Cholelithiasis without CT evidence for acute cholecystitis. The liver, pancreas, spleen, adrenals and kidneys reveal no acute findings. Probable scarring in the left renal kidney appears unchanged. Right renal cyst. GI/Bowel: Large left diaphragmatic hernia contains the stomach with organoaxial rotation. Loops of colon and small bowel are also present. The pancreas is also herniated. No inflammatory change or evidence for bowel obstruction. Patulous segment of the sigmoid colon with mild stool burden in the distal colon is a chronic appearance. Pelvis: No acute findings. Excretion of contrast in the bladder is noted. Peritoneum/Retroperitoneum: No free air or free fluid. No lymphadenopathy. The aorta is normal in caliber.   Calcified atheromatous plaque is present. Bones/Soft Tissues: Osteopenia. Advanced facet arthropathy in the lower lumbar spine with grade 1 anterolisthesis of L4. Mild disc disease at L1-2. Thoracolumbar levoscoliosis is noted. Moderate degenerative change in the hips. Scattered interstitial and ground-glass opacities are more prominent in the lungs bilaterally. Debris in the trachea and right mainstem bronchus is also noted. Constellation of findings are suspicious for aspiration pneumonitis. Large left diaphragmatic hernia, as seen previously. Organo-axial rotation of the stomach is noted. Chronic findings, as above. Cta Head W Contrast    Result Date: 3/28/2019  Ordering Provider: [facility]  Name:   Ramesh Delatorre         : 1950  Unit #:   [id number]         Age:  81  Account #:     [de-identified]      Attending Physician:     Pt Location:   ER   Date of Service:   19    CTA HEAD W/ CONTRAST; CTA NECK W/ CONTRAST    HISTORY:  Stroke-like symptoms       Study:  CTA of the head and neck with contrast dated 2019.  2236 hours       TECHNIQUE:   Axial images were obtained through the brain after the administration of 100   ml Visipaque 320. Coronal, sagittal, and 3-D reconstruction imaging was   performed. Individualized dose optimization techniques were utilized. After   this, axial images were obtained from the base of the skull down to the   aortic arch. 100 ml Visipaque 320 was injected. Coronal, sagittal, and 3-D   reconstruction imaging was performed. Individualized dose optimization   techniques were utilized. Findings: The common carotid arteries are normal in caliber and free of   plaque. The carotid bulbs and internal carotid arteries are normal in caliber without   evidence of significant plaque or stenosis.        Within the brain, cavernous carotids, supraclinoid internal carotids, middle   cerebral and anterior cerebral branches are normal in caliber without   evidence of stricture, aneurysm or vascular malformation. There is decreased   vascular flow in the location of the large middle cerebral territory infarcts   which are old. Basilar, posterior cerebral and superior cerebellar branches are normal in   caliber without evidence of aneurysm or vascular malformation. The aorta and pulmonary arteries are normal in caliber. Eventration of the   left hemidiaphragm is present. The stomach is largely intrathoracic. Associated basilar atelectasis is present on the left. IMPRESSION:   Unremarkable CTA of the head. Unremarkable CTA of the neck. EXAM/ORDER VERIFICATION: Y  PT/FAMILY VERBALIZES UNDERSTANDING OF EXAM Y  PT SHIELDED N  Is pt pregnant? LMP   TECH INITIALS MV               COMMENTS       Electronically Signed by:  Jaylin Justin M.D.  03/28/19 1133  ________________________________________  Jaylin Justin M.D. Date Dict:  03/28/19 Adebayo Arce M.D. Date Trans: 03/28/19 0828 [facility]    [id number]    cc: Diana WESTON D. ORacheal        Labs and Images reviewed with:  [x] Dr. Wesley    [] Dr. Ayse Brooks  [] Dr. Amy Watson  [] There are no new interval images to review. PHYSICAL EXAM       CONSTITUTIONAL:  Intubated and sedated. spo movements Right side upper>lower. Left side hemiplegia   HEAD:  normocephalic, atraumatic    EYES:  PERRL   ENT:  Moist mucous membranes   NECK:  supple, symmetric   LUNGS:  Equal air entry bilaterally. Copious secretions    CARDIOVASCULAR:  normal s1 / s2, RRR,    ABDOMEN:  Soft, no rigidity   NEUROLOGIC:  Mental Status:  Intubated, spo eye opening off sedation, left hemiplegia. , not following commands             Cranial Nerves:    III: Pupils:  equal, round, reactive    Motor Exam:    Spontaneously moving right upper>lower  Left hemiplegia     Sensory:        Deep Tendon Reflexes: any changes in exam or patient status please contact Neuro Critical Care.       Elisabet Goncalves DO  Neuro Critical Care  Pager [phone]  3/29/2019     7:02 AM

## 2019-03-29 NOTE — PROGRESS NOTES
Occupational Therapy Not Seen Note    DATE: 3/29/2019  Name: Earline Baeza  : 1950  MRN: [id number]    Patient not available for Occupational Therapy due to: Other: pt intubated and not following commands.      Next Scheduled Treatment: check back 3/31/19    Electronically signed by KENAN Meza on 3/29/2019 at 9:56 AM

## 2019-03-29 NOTE — PROGRESS NOTES
Patient admitted on Mechanical Ventilator Protocol. Patients height measured at 64\" for an IBW 54.7kg    Patient placed on the ventilator on settings as charted on flowsheeet. Ventilator Bronchodilator assessment    Breath sounds: rhonchi  Inspiratory Pressure: 17   Plateau Pressure: 14    Patient assessed at level 1          []    Bronchodilator Assessment    BRONCHODILATOR ASSESSMENT SCORE  Score 0 (Home) 1 2 3 4   Breath Sounds   []  Chronic Ventilator: Patient at baseline [x]  Mild Wheezes/ Clear []  Intermittent wheezes with good air entry []  Bilateral/unilateral wheezing with diminished air entry []  Insp/Exp wheeze and/or poor aeration   Ventilator Pressures   []  Chronic Ventilator [x]  Insp. Pressure less than 25 cm H20 []  Insp. Pressure less than 25 cm H20 []  Insp. Pressure exceeds 25 cm H20 []  Insp.  Pressure exceeds 30 cm H20   Plateau Pressure []  NA   [x]  Plateau Pressure less than 4  []  Plateau Pressure less than or equal to 5 []  Plateau Pressure greater than or equal to 6 []  Plateau Pressure greater than or equal to 8       [facility] [place]  9:03 PM

## 2019-03-29 NOTE — PROCEDURES
PROCEDURE NOTE - EMERGENCY INTUBATION    PATIENT NAME: [facility] RECORD NO. [id number]  DATE: 3/28/2019  ATTENDING: Dr. Monique Pavon: JUAN SNOW DO    PREOPERATIVE DIAGNOSIS:  Acute Respiratory Failure  POSTOPERATIVE DIAGNOSIS:  Same  PROCEDURE PERFORMED:  Emergency endotracheal intubation  ATTENDING: Dr. Lorraine Reyes: Colleen Uribe MD  COMPLICATIONS:  None  OPERATIVE NOTE PREPARED BY: Colleen Uribe MD    MEDICATIONS: etomidate intravenously and succinycholine 50 mg intravenously    DISCUSSION:  Goldie Chandler is a 81y.o.-year-old female who requires intubation and ventilatory support due to impending respiratory failure. The history and physical examination were reviewed and confirmed. CONSENT: The family members were counseled regarding the procedure, its indications, risks, potential complications and alternatives, and any questions were answered. Consent was obtained to proceed. PROCEDURE:  A timeout was initiated by the bedside nurse and was confirmed by those present. The patient was placed in the appropriate position. Cricoid pressure was utilized. Intubation was performed by direct laryngoscopy using a laryngoscope and a 7.5 cuffed endotracheal tube. The cuff was then inflated and the tube was secured appropriately at a distance of 25 cm to the dental ridge. Initial confirmation of placement included bilateral breath sounds, an end tidal CO2 detector, absence of sounds over the stomach, tube fogging and adequate chest rise. A chest x-ray to verify correct placement of the tube showed appropriate tube position. The patient tolerated the procedure well.       Colleen Uribe MD  3/28/19, 8:47 PM

## 2019-03-30 NOTE — PLAN OF CARE
Problem: OXYGENATION/RESPIRATORY FUNCTION  Goal: Patient will maintain patent airway  3/29/2019 2110 by Obinna Ferreira RCP  Outcome: Ongoing     Problem: OXYGENATION/RESPIRATORY FUNCTION  Goal: Patient will achieve/maintain normal respiratory rate/effort  Description  Respiratory rate and effort will be within normal limits for the patient  3/29/2019 2110 by Obinna Ferreira RCP  Outcome: Ongoing     Problem: MECHANICAL VENTILATION  Goal: Mobility/activity is maintained at optimum level for patient  3/29/2019 2110 by Obinna Ferreira RCP  Outcome: Ongoing     Problem: MECHANICAL VENTILATION  Goal: Ability to express needs and understand communication  3/29/2019 2110 by Obinna Ferreira RCP  Outcome: Ongoing     Problem: MECHANICAL VENTILATION  Goal: Patient will maintain patent airway  3/29/2019 2110 by Obinna Ferreira RCP  Outcome: Ongoing     Problem: MECHANICAL VENTILATION  Goal: Oral health is maintained or improved  3/29/2019 2110 by Obinna Ferreira RCP  Outcome: Ongoing     Problem: MECHANICAL VENTILATION  Goal: ET tube will be managed safely  3/29/2019 2110 by Obinna Ferreira RCP  Outcome: Ongoing     Problem: SKIN INTEGRITY  Goal: Skin integrity is maintained or improved  3/29/2019 2110 by Obinna Ferreira RCP  Outcome: Ongoing

## 2019-03-30 NOTE — PROGRESS NOTES
Ventilator Bronchodilator assessment    Breath sounds: Clear/diminished  Inspiratory Pressure: 15  Plateau Pressure: 12    Patient assessed at level 1          []    Bronchodilator Assessment    BRONCHODILATOR ASSESSMENT SCORE  Score 0 (Home) 1 2 3 4   Breath Sounds   []  Chronic Ventilator: Patient at baseline [x]  Mild Wheezes/ Clear []  Intermittent wheezes with good air entry []  Bilateral/unilateral wheezing with diminished air entry []  Insp/Exp wheeze and/or poor aeration   Ventilator Pressures   []  Chronic Ventilator []  Insp. Pressure less than 25 cm H20 []  Insp. Pressure less than 25 cm H20 []  Insp. Pressure exceeds 25 cm H20 []  Insp.  Pressure exceeds 30 cm H20   Plateau Pressure []  NA   [x]  Plateau Pressure less than 4  []  Plateau Pressure less than or equal to 5 []  Plateau Pressure greater than or equal to 6 []  Plateau Pressure greater than or equal to 8       [facility] TARIQ FARMER  6:18 PM

## 2019-03-30 NOTE — PLAN OF CARE
Problem: HEMODYNAMIC STATUS  Goal: Patient has stable vital signs and fluid balance  Outcome: Ongoing     Problem: ACTIVITY INTOLERANCE/IMPAIRED MOBILITY  Goal: Mobility/activity is maintained at optimum level for patient  3/30/2019 1056 by Conor Kathleen RN  Outcome: Ongoing  3/30/2019 0841 by Edenilson Petit RCP  Outcome: Ongoing  3/29/2019 2110 by Jordy Bravo RCP  Outcome: Ongoing     Problem: COMMUNICATION IMPAIRMENT  Goal: Ability to express needs and understand communication  3/30/2019 1056 by Conor Kathleen RN  Outcome: Ongoing  3/30/2019 0841 by Edenilson Petit RCP  Outcome: Ongoing  3/29/2019 2110 by Jordy Bravo RCP  Outcome: Ongoing     Problem: Risk for Impaired Skin Integrity  Goal: Tissue integrity - skin and mucous membranes  Description  Structural intactness and normal physiological function of skin and  mucous membranes.   Outcome: Ongoing     Problem: Falls - Risk of:  Goal: Will remain free from falls  Description  Will remain free from falls  Outcome: Ongoing  Goal: Absence of physical injury  Description  Absence of physical injury  Outcome: Ongoing     Problem: OXYGENATION/RESPIRATORY FUNCTION  Goal: Patient will maintain patent airway  3/30/2019 1056 by Conor Kathleen RN  Outcome: Ongoing  3/30/2019 0841 by Edenilson Petit RCP  Outcome: Ongoing  3/29/2019 2110 by Jordy Bravo RCP  Outcome: Ongoing  Goal: Patient will achieve/maintain normal respiratory rate/effort  Description  Respiratory rate and effort will be within normal limits for the patient  3/30/2019 1056 by Conor Kathleen RN  Outcome: Ongoing  3/30/2019 0841 by Edenilson Petit RCP  Outcome: Ongoing  3/29/2019 2110 by Jordy Bravo RCP  Outcome: Ongoing     Problem: MECHANICAL VENTILATION  Goal: Mobility/activity is maintained at optimum level for patient  3/30/2019 1056 by Conor Kathleen RN  Outcome: Ongoing  3/30/2019 0841 by Edenilson Petit RCP  Outcome: Ongoing  3/29/2019 2110 by Jordy Bravo JOHNNIE  Outcome: Ongoing  Goal: Ability to express needs and understand communication  3/30/2019 1056 by Tarah Alonso RN  Outcome: Ongoing  3/30/2019 0841 by Mery Watkins RCP  Outcome: Ongoing  3/29/2019 2110 by Burton Cortes RCP  Outcome: Ongoing  Goal: Patient will maintain patent airway  3/30/2019 1056 by Tarah Alonso RN  Outcome: Ongoing  3/30/2019 0841 by Mery Watkins RCP  Outcome: Ongoing  3/29/2019 2110 by Burton Cortes RCP  Outcome: Ongoing  Goal: Oral health is maintained or improved  3/30/2019 1056 by Tarah Alonso RN  Outcome: Ongoing  3/30/2019 0841 by Mery Watkins RCP  Outcome: Ongoing  3/29/2019 2110 by Burton Cortes RCP  Outcome: Ongoing  Goal: ET tube will be managed safely  3/30/2019 1056 by Tarah Alonso RN  Outcome: Ongoing  3/30/2019 0841 by Mery Watkins RCP  Outcome: Ongoing  3/29/2019 2110 by Burton Cortes RCP  Outcome: Ongoing     Problem: SKIN INTEGRITY  Goal: Skin integrity is maintained or improved  3/30/2019 1056 by Tarah Alonso RN  Outcome: Ongoing  3/30/2019 0841 by Mery Watkins RCP  Outcome: Ongoing  3/29/2019 2110 by Burton Cortes RCP  Outcome: Ongoing     Problem: Restraint Use - Nonviolent/Non-Self-Destructive Behavior:  Goal: Absence of restraint indications  Description  Absence of restraint indications  Outcome: Ongoing  Goal: Absence of restraint-related injury  Description  Absence of restraint-related injury  Outcome: Ongoing     Problem: Nutrition  Goal: Optimal nutrition therapy  Outcome: Ongoing

## 2019-03-30 NOTE — PROGRESS NOTES
Notified Dr. Kee Vásquez patient SBP in the 70's. Head of bed lowered, Diprivan stopped. Neuro assessment unchanged. New order for 500 ml bolus. During bolus Vital signs beginning to normalize.  Current SBP in the 90's       Will continue to closely monitor

## 2019-03-30 NOTE — PROGRESS NOTES
change 11/25/13: [employer] AK     CATH 1/10/08: NICM. EF 30%     Dr. Bradley Knapp -- 2/28/19   Nonischemic cardiomyopathy, chronic systolic HF. COMPENSATED  AICD IN SITU   HTN  Multiple CVAs. A/C WITH XARELTO    ECHO 3/28/19  Summary  Left ventricle is normal in size. Global left ventricular systolic function  is moderately reduced. Estimated ejection fraction is 35 % . The apex and apical segments are hypokinetic. Grade I (mild) left ventricular diastolic dysfunction. Pacemaker / ICD lead seen in right atrium and ventricle. Thickened mitral valve leaflets. Mild mitral regurgitation. Mild tricuspid regurgitation. Estimated right ventricular systolic pressure is 25 mmHg. Objective:   Vitals: /72   Pulse 79   Temp 97.3 °F (36.3 °C) (Axillary)   Resp 17   Ht 5' 4\" (1.626 m)   Wt 108 lb 7.5 oz (49.2 kg)   SpO2 100%   BMI 18.62 kg/m²   General appearance: intubated  HEENT: Head: Normocephalic, no lesions, without obvious abnormality. Neck: no JVD, trachea midline, no adenopathy  Lungs: Clear to auscultation  Heart: Regular rate and rhythm, s1/s2 auscultated, no murmurs  Abdomen: soft, non-tender, bowel sounds active  Extremities: no edema  Neurologic: not done      Patient Active Problem List:     Elevated fasting glucose     Nonischemic cardiomyopathy (HCC)     Hyperlipidemia     Anxiety     History of tobacco abuse     Fibrocystic breast disease     Mild mitral regurgitation     Sick sinus syndrome (HCC)     Automatic implantable cardioverter-defibrillator in situ     Cerebral infarction (HCC)     Superior mesenteric vein thrombosis     Functional urinary incontinence     Expressive aphasia     Stroke-like symptoms     Acute encephalopathy     History of cerebral infarction     History of implantable cardioverter-defibrillator (ICD) placement     Hiatal hernia     Chronic atrial fibrillation (HCC)      Assessment / Acute Cardiac Problems:   1. PAF currently in atrial paced rhythm. Sinus  2.

## 2019-03-31 NOTE — PROGRESS NOTES
Daily Progress Note  Neuro Critical Care    Patient Name: Gonzales Romano  Patient : 1950  Room/Bed: [facility]/0138-80  Allergies: Allergies   Allergen Reactions    Other Shortness Of Breath     Sawdust.       Ace Inhibitors Other (See Comments)     Hypotension, dizziness.  Beta Adrenergic Blockers Other (See Comments)     Hypotension, dizziness.  Adhesive Tape Rash     Problem List:   Patient Active Problem List   Diagnosis    Elevated fasting glucose    Nonischemic cardiomyopathy (HCC)    Hyperlipidemia    Anxiety    History of tobacco abuse    Fibrocystic breast disease    Mild mitral regurgitation    Sick sinus syndrome (HCC)    Automatic implantable cardioverter-defibrillator in situ    Cerebral infarction ([address]    Superior mesenteric vein thrombosis    Functional urinary incontinence    Expressive aphasia    Stroke-like symptoms    Acute encephalopathy    History of cerebral infarction    History of implantable cardioverter-defibrillator (ICD) placement    Hiatal hernia    Chronic atrial fibrillation ([address]    Acute ischemic stroke ([address]    Aspiration pneumonia of both upper lobes ([address]         INTERVAL HISTORY    Initial Presentation (Admitted 3/28): The patient is a 81 y.o. female presented with presents with concern for CVA.  Transfer from [facility] ED, presented with NIH 21.  Last known well 1200 3/27/18.  Previous records indicate pt found by family w/ AMS, aphasia, right sided deficits.  PMH of previous CVA, afib as on outpatient Xarelto but pt discontinued a few months.  Previous dysarthria, no reported residual weakness. Symptom onset has been constant for a time period of 13 hour(s). Severity is described as moderate to severe. Course of her symptoms over time is constant since onset. CT head, CTA head/neck obtain prior to arrival.     Hospital Course:    3/28: intubated due to impending respiratory failure.    3/29: Intubated overnight for impending respiratory Failure. Copious secreations, remains on zosyn, zithromax. 3/30: patient was hypotensive overnight. Received bolus of IV fluids. No good response. Was started on norepinephrine drip. Blood pressure has been controlled with 1 to 2 mics . Troponins are trending down. Over the last 24 hours, no acute events. Hypotension. On low dose of levophed. Switched to dobutamine.      CURRENT MEDICATIONS:  SCHEDULED MEDICATIONS:   senna  1 tablet Oral Nightly    hydrocortisone sodium succinate PF  100 mg Intravenous Q8H    calcium gluconate  1 g Intravenous Once    potassium & sodium phosphates  1 packet Per NG tube 4x Daily    scopolamine  1 patch Transdermal Q72H    aspirin  81 mg Oral Daily    atorvastatin  40 mg Oral Nightly    sodium chloride flush  10 mL Intravenous 2 times per day    famotidine (PEPCID) injection  20 mg Intravenous Daily    azithromycin  500 mg Intravenous Q24H    heparin (porcine)  5,000 Units Subcutaneous 3 times per day    lidocaine   Topical Once    piperacillin-tazobactam  3.375 g Intravenous Q8H     CONTINUOUS INFUSIONS:   DOBUTamine 2.5 mcg/kg/min (19 1425)    norepinephrine 8 mcg/min (19 2315)    sodium chloride 75 mL/hr at 19 2235    propofol Stopped (19 1900)     PRN MEDICATIONS:   sodium chloride flush, magnesium hydroxide, ondansetron, acetaminophen, hydrALAZINE    VITALS:  Temperature Range: Temp: 99.2 °F (37.3 °C) Temp  Av.6 °F (37 °C)  Min: 97.2 °F (36.2 °C)  Max: 99.3 °F (37.4 °C)  BP Range: Systolic (18IHY), NAB:746 , Min:83 , CLF:971     Diastolic (53QUH), QNQ:09, Min:31, Max:89    Pulse Range: Pulse  Av.3  Min: 70  Max: 110  Respiration Range: Resp  Av  Min: 14  Max: 25  Current Pulse Ox: SpO2: 99 %  24HR Pulse Ox Range: SpO2  Av %  Min: 97 %  Max: 100 %  Patient Vitals for the past 12 hrs:   BP Pulse Resp SpO2   19 1532 -- 95 20 99 %   19 1145 -- 77 19 99 %   19 0845 -- 82 21 99 %   19 0700 -- -- -- 98 %   03/31/19 0659 (!) 99/45 80 17 97 %   03/31/19 0605 (!) 109/59 103 25 98 %     Estimated body mass index is 18.62 kg/m² as calculated from the following:    Height as of this encounter: 5' 4\" (1.626 m). Weight as of this encounter: 108 lb 7.5 oz (49.2 kg).  []<16 Severe malnutrition  []16-16.99 Moderate malnutrition  []17-18.49 Mild malnutrition  [x]18.5-24.9 Normal  []25-29.9 Overweight (not obese)  []30-34.9 Obese class 1 (Low Risk)  []35-39.9 Obese class 2 (Moderate Risk)  []?40 Obese class 3 (High Risk)    RECENT LABS:   Lab Results   Component Value Date    WBC 11.4 (H) 03/31/2019    HGB 12.2 03/31/2019    HCT 37.6 03/31/2019     03/31/2019    CHOL 111 03/29/2019    TRIG 69 03/29/2019    HDL 57 03/29/2019    ALT 14 03/27/2019    AST 28 03/27/2019     03/31/2019    K 4.2 03/31/2019     (H) 03/31/2019    CREATININE 0.75 03/31/2019    BUN 12 03/31/2019    CO2 18 (L) 03/31/2019    INR 1.0 03/28/2019    LABA1C 5.3 02/28/2017     24 HOUR INTAKE/OUTPUT:    Intake/Output Summary (Last 24 hours) at 3/31/2019 1718  Last data filed at 3/31/2019 0600  Gross per 24 hour   Intake 3333 ml   Output 400 ml   Net 2933 ml       RADIOLOGY:   Ct Head Wo Contrast    Result Date: 3/29/2019  EXAMINATION: CT OF THE HEAD WITHOUT CONTRAST  3/29/2019 4:46 am TECHNIQUE: CT of the head was performed without the administration of intravenous contrast. Dose modulation, iterative reconstruction, and/or weight based adjustment of the mA/kV was utilized to reduce the radiation dose to as low as reasonably achievable. COMPARISON: CT head done September 28, 2017.  HISTORY: ORDERING SYSTEM PROVIDED HISTORY: left hemiparesis possibly acute, eval for acute ischemia (unable to get MRI due to PPM/AICD) TECHNOLOGIST PROVIDED HISTORY: Ordering Physician Provided Reason for Exam: eval for acute ischemia Acuity: Unknown Type of Exam: Unknown FINDINGS: BRAIN/VENTRICLES: Moderate-sized patchy area of hypoattenuation with loss of the gray-white matter interface in the medial right frontal parietal region may relate to acute infarct. Large areas of encephalomalacia in the bilateral cerebral hemispheres are unchanged from the prior study and likely relate to bilateral middle cerebral artery territory infarcts. Small left caudate remote lacunar infarct. No acute intracranial hemorrhage. No mass effect or midline shift. No abnormal extra-axial fluid collection. The basal cisterns are patent. Ill-defined periventricular white matter hypoattenuation, commonly seen in the setting of chronic small vessel ischemic disease. Generalized cerebral and cerebellar volume loss. No hydrocephalus. ORBITS: The visualized portion of the orbits demonstrate no acute abnormality. SINUSES: The visualized paranasal sinuses and mastoid air cells demonstrate no acute abnormality. Partially visualized nasogastric tube. SOFT TISSUES/SKULL:  No acute abnormality of the visualized skull or soft tissues. Redemonstration of hyperostosis frontalis. Moderate-sized patchy area of hypoattenuation in the medial right frontal parietal region suggests acute infarct. No acute intracranial hemorrhage. Redemonstration of large areas of encephalomalacia in the bilateral cerebral hemispheres which are similar to the prior study and likely relate to old bilateral middle cerebral artery territory infarct. Chronic small vessel ischemic disease. Small left caudate remote lacunar infarct. Ct Head Wo Contrast    Result Date: 3/28/2019  Ordering Provider: [address] [place] [address]  Name:   Farhat Franklin         : 1950  Unit #:   [id number]         Age:  81  Account #:     [de-identified]      Attending Physician:     Pt Location:   ER   Date of Service:   19    CT HEAD W/O CONTRAST    HISTORY:  Stroke-like symptoms.        Study:  Head CT without contrast dated 2019.  2137 hours       TECHNIQUE: Axial images were obtained through the brain without IV   contrast. Images were viewed in soft tissue and bone windows. Individualized   dose optimization techniques were used for the CT scan. Findings:  Large bilateral old frontal lobe infarcts are present. The   ventricles and cisternal spaces are normal in size and configuration without   midline shift or mass effect. There is no hemorrhage or hematoma. No acute   parenchymal abnormalities. IMPRESSION:   Large old bilateral middle cerebral territory infarcts. No acute brain   abnormalities. EXAM/ORDER VERIFICATION: Y  PT/FAMILY VERBALIZES UNDERSTANDING OF EXAM Y  PT SHIELDED N  Is pt pregnant? LMP   TECH INITIALS MV               COMMENTS       Electronically Signed by:  Monica Gaines M.D.  19 0825  ________________________________________  Monica Gaines M.D. Date Dict:  19 Chandler Bruno M.D. Date Trans: 19 0822 [facility]    [id number]    cc: Elke CANO Xr Chest Portable    Result Date: 3/28/2019  EXAMINATION: SINGLE XRAY VIEW OF THE CHEST 3/28/2019 9:05 pm COMPARISON:  HISTORY: ORDERING SYSTEM PROVIDED HISTORY: s/p intubation TECHNOLOGIST PROVIDED HISTORY: s/p intubation FINDINGS: ET tube projects over the mid trachea. Enteric tube terminates left of midline projecting over lobular high density which is likely contrast in a hiatal hernia or sequela of diaphragmatic hernia. Basilar airspace disease on the left is noted with poor visualization of the hemidiaphragm. ET tube as described. Probable left basilar airspace disease.  Hiatal hernia versus diaphragmatic hernia which obscures the left hemidiaphragm and left lung base     Xr Chest Portable    Result Date: 3/28/2019  Ordering Provider: Kevin Molina [facility]  Name:   Shaggy Delgado         : 1950  Unit #:   [id number]         Age:  81  Account #:     [de-identified] Attending Physician:     Pt Location:   ER   Date of Service:   03/27/19    CHEST - PORTABLE    HISTORY:  Unresponsive. Left-sided body weakness. History of strokes     Study:  Portable chest dated 03/27/2019.  2219 hours     Findings:  Pacer and defibrillator wires are evident. The stomach is  largely intrathoracic. Eventration of the diaphragm is evident. Basilar  atelectasis is present. The rest of the lungs are clear     IMPRESSION:    Very large hiatal hernia. Basilar atelectasis. EXAM/ORDER VERIFICATION: Y  PT/FAMILY VERBALIZES UNDERSTANDING OF EXAM Y  PT SHIELDED Y  Is pt pregnant? LMP   TECH INITIALS MV               COMMENTS       Electronically Signed by:  Uriel Gallegos M.D.  03/28/19 1133  ________________________________________  Uriel Gallegos M.D. Date Dict:  03/28/19 April Mariam SOOD Date Trans: 03/28/19 0839 [facility]    [id number]    cc: Gaylen Curie MD Kern Greenspan D. Rex Brunner R D. O. Xr Chest Portable    Result Date: 3/28/2019  EXAMINATION: SINGLE XRAY VIEW OF THE CHEST 3/28/2019 5:55 am COMPARISON: March 27, 2019. December 25, 2016. HISTORY: ORDERING SYSTEM PROVIDED HISTORY: Concern for aspiriation TECHNOLOGIST PROVIDED HISTORY: Concern for aspiriation FINDINGS: Stable left pectoral trans venous cardiac pacer/ICD. Low left lung volume with elevation of the left hemidiaphragm. Bilateral basilar predominant heterogeneous opacities are similar to the prior study. No definite pleural effusion or pneumothorax. Grossly stable cardiomediastinal silhouette and great vessels. Overall, no significant interval change with redemonstration of low left lung volume with elevation of the left hemidiaphragm and bilateral basilar predominant heterogeneous opacities. Correlate for possible hiatal or diaphragmatic hernia.      Vascular Carotid Duplex Bilateral    Result Date: 3/28/2019    [facility]  Vascular Carotid Procedure   Patient Name  John Abraham  Date of Study                J   Date of Birth 1950  Gender                  Female   Age           81 year(s)  Race                       Room Number   4602   Corporate ID  [id number]  #   Mei Gonzalez  [de-identified]  #   MR #          [id number]     Sonographer             Latosha Schwab   Accession #   [id number]   Interpreting Physician  Radha Maharaj   Referring                 Referring Physician     Rosario Robbins MD  Nurse  Practitioner  Procedure Type of Study:   Cerebral: Carotid, Carotid Scan Bilateral.  Indications for Study:Carotid stenosis. Blood Pressure:Left arm 99/56 mmHg. Patient Status: In Patient. Limitation reason:BP not taken on right arm due to I.V. .  Conclusions   Summary   Mild 16-49% stenosis of the right internal carotid artery. Hemodynamically normal left carotid artery scan. Patent vertebral arteries bilaterally with antegrade flow. Signature   ----------------------------------------------------------------  Electronically signed by Angely BedollaSonographer) on  03/28/2019 11:40 AM  ----------------------------------------------------------------   ----------------------------------------------------------------  Electronically signed by Liz Allen(Interpreting  physician) on 03/28/2019 09:23 PM  ----------------------------------------------------------------  Findings:   Right Impression:                        Left Impression:  The common carotid artery has a smooth   The common carotid artery has a  homogeneous plaque causing a <50%        smooth homogeneous plaque causing  stenosis. a <50% stenosis. The carotid bulb has an irregular        The carotid bulb has a smooth  heterogeneous plaque causing a <50%      homogeneous plaque causing a <50%  stenosis. stenosis.    The internal carotid artery has a smooth The internal carotid artery is  homogeneous plaque causing a <50%        normal.  stenosis based on velocities. The external carotid artery has a  The external carotid artery has a smooth smooth homogeneous plaque causing  homogeneous plaque causing a <50%        a <50% stenosis. stenosis. The vertebral artery is patent  The vertebral artery is patent with      with antegrade flow. antegrade flow. Allergies   - Allergy: Ace Inhibitors(Drug). - Allergy:Tape(Miscellaneous). - Allergy:*Unlisted(Drug). Comments:beta adrenergic blockers Velocities are measured in cm/s ; Diameters are measured in cm Carotid Right Measurements +------------+-------+-------+--------+-------+------------+---------------+ ! Location    ! PSV    ! EDV    ! Angle   ! RI     !%Stenosis   ! Tortuosity     ! +------------+-------+-------+--------+-------+------------+---------------+ ! Prox CCA    !72.5   !14.9   !60      !0.79   !            !               ! +------------+-------+-------+--------+-------+------------+---------------+ ! Mid CCA     !73.3   !14.1   ! 60      !0.81   !            !               ! +------------+-------+-------+--------+-------+------------+---------------+ ! Dist CCA    !54.1   !11.4   !60      !0.79   !            !               ! +------------+-------+-------+--------+-------+------------+---------------+ ! Bulb        !51.7   !14.1   ! 60      !0.73   !            !               ! +------------+-------+-------+--------+-------+------------+---------------+ ! Prox ICA    !38.1   !8.87   !60      !0.77   !            !               ! +------------+-------+-------+--------+-------+------------+---------------+ ! Mid ICA     !51.5   !18.3   ! 60      !0.64   !            !               ! +------------+-------+-------+--------+-------+------------+---------------+ ! Dist ICA    ! 40.7   !17.3   ! 60      !0.57   !            !               ! +------------+-------+-------+--------+-------+------------+---------------+ ! Prox ECA    !67.6   !10.9   !60      !0.84   !            !               ! +------------+-------+-------+--------+-------+------------+---------------+ ! Vertebral   !31.9   !8.87   !60      !0.72   !            !               ! +------------+-------+-------+--------+-------+------------+---------------+   - There is antegrade vertebral flow noted on the right side. - Additional Measurements:ICAPSV/CCAPSV 0.71. ICAEDV/CCAEDV 1.23. Carotid Left Measurements +------------+-------+-------+--------+-------+------------+---------------+ ! Location    ! PSV    ! EDV    ! Angle   ! RI     !%Stenosis   ! Tortuosity     ! +------------+-------+-------+--------+-------+------------+---------------+ ! Prox CCA    !63.1   !8.76   !60      !0.86   !            !               ! +------------+-------+-------+--------+-------+------------+---------------+ ! Mid CCA     !59.9   !8.41   ! 60      !0.86   !            !               ! +------------+-------+-------+--------+-------+------------+---------------+ ! Dist CCA    !45.9   !9.81   !60      !0.79   !            !               ! +------------+-------+-------+--------+-------+------------+---------------+ ! Bulb        !43.4   !9.11   ! 60      !0.79   !            !               ! +------------+-------+-------+--------+-------+------------+---------------+ ! Prox ICA    ! 34.7   !9.07   !60      !0.74   !            !               ! +------------+-------+-------+--------+-------+------------+---------------+ ! Mid ICA     !39     !13.6   ! 60      !0.65   !            !               ! +------------+-------+-------+--------+-------+------------+---------------+ ! Dist ICA    !41     !9.05   !60      !0.78   !            !               ! +------------+-------+-------+--------+-------+------------+---------------+ ! Prox ECA    !71.3   !9.8    ! 60      !0.86   !            !               ! vascular malformation. The aorta and pulmonary arteries are normal in caliber. Eventration of the   left hemidiaphragm is present. The stomach is largely intrathoracic. Associated basilar atelectasis is present on the left. IMPRESSION:   Unremarkable CTA of the head. Unremarkable CTA of the neck. EXAM/ORDER VERIFICATION: Y  PT/FAMILY VERBALIZES UNDERSTANDING OF EXAM Y  PT SHIELDED N  Is pt pregnant? LMP   TECH INITIALS MV               COMMENTS       Electronically Signed by:  Miguel Medina M.D.  03/28/19 1133  ________________________________________  Miguel Medina M.D. Date Dict:  03/28/19 Farhad Edwards M.D. Date Trans: 03/28/19 0828 [facility]    [id number]    cc: Carver Russel MD Girtha Hakim D. Esequiel Duverney R D. O.        Labs and Images reviewed with:  [x] Dr. Audrey Granados. Joe    [] Dr. David Nickerson  [] Dr. Santos Crocker  [] There are no new interval images to review. PHYSICAL EXAM       CONSTITUTIONAL:  Intubated and sedated. spo movements Right side upper>lower. Left side hemiplegia   HEAD:  normocephalic, atraumatic    EYES:  PERRL   ENT:  Moist mucous membranes   NECK:  supple, symmetric   LUNGS:  Equal air entry bilaterally. Copious secretions    CARDIOVASCULAR:  normal s1 / s2, RRR,    ABDOMEN:  Soft, no rigidity   NEUROLOGIC:  Mental Status:  Intubated, spo eye opening off sedation, left hemiplegia. , not following commands             Cranial Nerves:    III: Pupils:  equal, round, reactive    Motor Exam:    Spontaneously moving right upper>lower  Left hemiplegia     Sensory:        Deep Tendon Reflexes:      Right Knee:  2+  Left Knee:  2+      Clonus:  absent           DRAINS:  [x] There are no drains for Neuro Critical Care to monitor at this time. ASSESSMENT AND PLAN:     This is a 81 y.o. female transfer from [facility] ED after presenting with aphasia, right sided deficits. Last known well 1200 3/27/19. PMH of previous CVA with residual dysarthria. CTH, CTA head/neck negative. Stroke alert called on arrival. Neuro-endovascular surgery aware prior to arrival    NEUROLOGIC: Left hemiplegia 2/2 acute CVA  - CT head, CTA head, neck obtained and reviewed. MRI unable to obtain due to AICD/pacer  -CT head  3/29: mod sized are of hypoatteneution medial right frontal parietal suggestive of acute infarct  - Goal -180  - Neuro checks per protocol  -Not on sedation  -Start therapeutic anticoagulation tomorrow morning      CARDIOVASCULAR:  - Goal -180  -DC levophed  -Start dobutamine  - Troponin trended down  -ASA, statin  - EKG shows new onset lateral T-wave changes. Cardiology consulted and agreed with heparin gtt when approved by Neuro-endovascular NS.   - Will plan to start Heparin gtt on Monday April 1st  - Continue telemetry     PULMONARY:  - Intubated 3/28  -On CPAP at day time     RENAL/FLUID/ELECTROLYTE:  -Normal renal function  - Monitor urine output  - IVF: NS @ 50 ml/hr  - Replace electrolytes PRN  - Daily BMP     GI/NUTRITION:  NUTRITION:  Tube feeding  -Dietary consult to start tube feeds  - GI prophylaxis: pepcid     ID:  - No fever spikes, WBC 11.4  - Procal 0.19  - Cultures NGTD  -ABX on zosyn, zithromax  - Continue to monitor for fevers  - Daily CBC     HEME:   - Hb 12.2  - Platelets 077  - Daily CBC     ENDOCRINE:  - Continue to monitor blood glucose, goal <180    OTHER:  - PT/OT/ST   - Code Status: Full     PROPHYLAXIS:  Stress ulcer: PPI     DVT PROPHYLAXIS:  - SCD sleeves - Thigh High   -Heparin       DISPOSITION:  [x] To remain ICU: For ventilation management      We will continue to follow along. For any changes in exam or patient status please contact Neuro Critical Care.       Bobby Yee MD  Neuro Critical Care  Pager [phone]  3/31/2019     5:18 PM

## 2019-03-31 NOTE — PROGRESS NOTES
Physical Therapy  DATE: 3/31/2019    NAME: Tru Hendrickson  MRN: [id number]   : 1950    Patient not seen this date for Physical Therapy due to:  [] Blood transfusion in progress  [] Hemodialysis  []  Patient Declined  [] Spine Precautions   [] Strict Bedrest  [] Surgery/ Procedure  [] Testing      [x] Other- Pt is intubated and weaning. Check back 19        [] PT being discontinued at this time. Patient independent. No further needs. [] PT being discontinued at this time as the patient has been transferred to palliative care. No further needs.     Shilpa Seth, PT

## 2019-03-31 NOTE — PROGRESS NOTES
[facility]   Progress Note                   Date:   3/31/2019  Patient name: Sola Mejia  Date of admission:  3/28/2019  1:05 AM  MRN:   [id number]  YOB: 1950  PCP: Leena Krishnan DO    Reason for Admission: Stroke-like symptom [R29.90]    Subjective:       Clinical Changes / Abnormalities: Pt seen and examined. Pt remains intubated. Medications:   Scheduled Meds:   senna  1 tablet Oral Nightly    hydrocortisone sodium succinate PF  100 mg Intravenous Q8H    calcium gluconate  1 g Intravenous Once    potassium & sodium phosphates  1 packet Per NG tube 4x Daily    scopolamine  1 patch Transdermal Q72H    aspirin  81 mg Oral Daily    atorvastatin  40 mg Oral Nightly    sodium chloride flush  10 mL Intravenous 2 times per day    famotidine (PEPCID) injection  20 mg Intravenous Daily    azithromycin  500 mg Intravenous Q24H    heparin (porcine)  5,000 Units Subcutaneous 3 times per day    lidocaine   Topical Once    piperacillin-tazobactam  3.375 g Intravenous Q8H     Continuous Infusions:   DOBUTamine 2.5 mcg/kg/min (03/31/19 1425)    norepinephrine 8 mcg/min (03/30/19 2315)    sodium chloride 75 mL/hr at 03/28/19 2235    propofol Stopped (03/30/19 1900)     CBC:   Recent Labs     03/29/19  0422 03/30/19  0507 03/30/19  1945 03/31/19  0455   WBC 9.5 9.6  --  11.4*   HGB 12.0 11.9  --  12.2   PLT See Reflexed IPF Result 111* 114* 172     BMP:    Recent Labs     03/29/19  0422 03/30/19  0507 03/31/19  0455    140 141   K 3.5* 3.5* 4.2   * 111* 114*   CO2 18* 19* 18*   BUN 11 12 12   CREATININE 0.74 0.72 0.75   GLUCOSE 97 137* 155*     Hepatic:   No results for input(s): AST, ALT, ALB, BILITOT, ALKPHOS in the last 72 hours. Troponin:   Recent Labs     03/29/19  0422 03/29/19  1849   TROPHS 100* 77*     BNP: No results for input(s): BNP in the last 72 hours.   Lipids:   Recent Labs     03/29/19 0422   CHOL 111   HDL 57     INR:   No results for input(s): INR in the last 72 hours. Diagnostics:    Cardiac Testing      ICD generator change 11/25/13: [employer] AK     CATH 1/10/08: NICM. EF 30%     Dr. Ophelia Perez -- 2/28/19   Nonischemic cardiomyopathy, chronic systolic HF. COMPENSATED  AICD IN SITU   HTN  Multiple CVAs. A/C WITH XARELTO    ECHO 3/28/19  Summary  Left ventricle is normal in size. Global left ventricular systolic function  is moderately reduced. Estimated ejection fraction is 35 % . The apex and apical segments are hypokinetic. Grade I (mild) left ventricular diastolic dysfunction. Pacemaker / ICD lead seen in right atrium and ventricle. Thickened mitral valve leaflets. Mild mitral regurgitation. Mild tricuspid regurgitation. Estimated right ventricular systolic pressure is 25 mmHg. Objective:   Vitals: BP (!) 99/45   Pulse 77   Temp 99.2 °F (37.3 °C) (Oral)   Resp 19   Ht 5' 4\" (1.626 m)   Wt 108 lb 7.5 oz (49.2 kg)   SpO2 99%   BMI 18.62 kg/m²   General appearance: intubated  HEENT: Head: Normocephalic, no lesions, without obvious abnormality.   Neck: no JVD, trachea midline, no adenopathy  Lungs: Clear to auscultation  Heart: Regular rate and rhythm, s1/s2 auscultated, no murmurs  Abdomen: soft, non-tender, bowel sounds active  Extremities: no edema  Neurologic: not done      Patient Active Problem List:     Elevated fasting glucose     Nonischemic cardiomyopathy (HCC)     Hyperlipidemia     Anxiety     History of tobacco abuse     Fibrocystic breast disease     Mild mitral regurgitation     Sick sinus syndrome (HCC)     Automatic implantable cardioverter-defibrillator in situ     Cerebral infarction (HCC)     Superior mesenteric vein thrombosis     Functional urinary incontinence     Expressive aphasia     Stroke-like symptoms     Acute encephalopathy     History of cerebral infarction     History of implantable cardioverter-defibrillator (ICD) placement     Hiatal hernia     Chronic atrial fibrillation (HCC)      Assessment / Acute Cardiac Problems:   1. PAF currently in atrial paced rhythm. Sinus  2. Non-ischemic Cardiomyopathy with no signs of volume overload. 3. S/P ICD. 4. Multiple CVAs with non-compliance with anticoagulation. 5. HTN. 6. Hyperlipidemia. Plan of Treatment:   1. PAF. xarelto on hold til Monday per neuro. 2. ECHO reviewed  3. Trops elevated but trending down. New onset lateral T wave changes on EKG. Will start heparin gtt when ok with neuro. On SQ heparin currently. Continue ASA/Statin. Will consider ischemia workup once acute issues resolve. 4. Goal SBP per neuro 110-180. No medications currently.     5. Keep K > 4     Electronically signed by Cletis Frankel, DO on 3/31/2019 at 3:00 PM  [address]  [phone]

## 2019-03-31 NOTE — PLAN OF CARE
Problem: HEMODYNAMIC STATUS  Goal: Patient has stable vital signs and fluid balance  Outcome: Ongoing     Problem: ACTIVITY INTOLERANCE/IMPAIRED MOBILITY  Goal: Mobility/activity is maintained at optimum level for patient  Outcome: Ongoing     Problem: COMMUNICATION IMPAIRMENT  Goal: Ability to express needs and understand communication  Outcome: Ongoing     Problem: Risk for Impaired Skin Integrity  Goal: Tissue integrity - skin and mucous membranes  Description  Structural intactness and normal physiological function of skin and  mucous membranes.   Outcome: Ongoing     Problem: Falls - Risk of:  Goal: Will remain free from falls  Description  Will remain free from falls  Outcome: Ongoing  Goal: Absence of physical injury  Description  Absence of physical injury  Outcome: Ongoing     Problem: OXYGENATION/RESPIRATORY FUNCTION  Goal: Patient will maintain patent airway  Outcome: Ongoing  Goal: Patient will achieve/maintain normal respiratory rate/effort  Description  Respiratory rate and effort will be within normal limits for the patient  Outcome: Ongoing     Problem: MECHANICAL VENTILATION  Goal: Mobility/activity is maintained at optimum level for patient  Outcome: Ongoing  Goal: Ability to express needs and understand communication  Outcome: Ongoing  Goal: Patient will maintain patent airway  Outcome: Ongoing  Goal: Oral health is maintained or improved  Outcome: Ongoing  Goal: ET tube will be managed safely  Outcome: Ongoing     Problem: SKIN INTEGRITY  Goal: Skin integrity is maintained or improved  Outcome: Ongoing     Problem: Restraint Use - Nonviolent/Non-Self-Destructive Behavior:  Goal: Absence of restraint indications  Description  Absence of restraint indications  Outcome: Ongoing  Goal: Absence of restraint-related injury  Description  Absence of restraint-related injury  Outcome: Ongoing     Problem: Nutrition  Goal: Optimal nutrition therapy  Outcome: Ongoing

## 2019-04-01 NOTE — PROGRESS NOTES
12:10 patient extubated with Corky Bussing and nurses at bedside. Pt tolerated well. Patient much calmer and following all commands.

## 2019-04-01 NOTE — PROGRESS NOTES
Occupational Therapy    Occupational Therapy Not Seen Note    DATE: 2019  Name: Alicia Blake  : 1950  MRN: [id number]    Patient not available for Occupational Therapy due to: Other: Pt intubated and not actively following commands at this time. Next Scheduled Treatment: Attempt on 4/3 as appropriate.     Electronically signed by Susan James OT on 2019 at 10:11 AM

## 2019-04-01 NOTE — PROGRESS NOTES
Insert Arterial Line  Date/Time:  03/31/19, 10:42 PM  Performed by: Braulio Gomez    Patient identity confirmed: arm band and provided demographic data   Time out: Immediately prior to procedure a \"time out\" was called to verify the correct patient, procedure, equipment, support staff. Preparation: Patient was prepped and draped in the usual sterile fashion.     Location:right radial    Vijay's test normal: yes  Needle gauge: 20     Number of attempts: 1  Post-procedure: transparent dressing applied and line secured    Patient tolerance: well

## 2019-04-01 NOTE — PROGRESS NOTES
Daily Progress Note  Neuro Critical Care    Patient Name: Saba Brooks  Patient : 1950  Room/Bed: 7695/4354-11  Allergies: Allergies   Allergen Reactions    Other Shortness Of Breath     Sawdust.       Ace Inhibitors Other (See Comments)     Hypotension, dizziness.  Beta Adrenergic Blockers Other (See Comments)     Hypotension, dizziness.  Adhesive Tape Rash     Problem List:   Patient Active Problem List   Diagnosis    Elevated fasting glucose    Nonischemic cardiomyopathy (HCC)    Hyperlipidemia    Anxiety    History of tobacco abuse    Fibrocystic breast disease    Mild mitral regurgitation    Sick sinus syndrome (HCC)    Automatic implantable cardioverter-defibrillator in situ    Cerebral infarction ([address]    Superior mesenteric vein thrombosis    Functional urinary incontinence    Expressive aphasia    Stroke-like symptoms    Acute encephalopathy    History of cerebral infarction    History of implantable cardioverter-defibrillator (ICD) placement    Hiatal hernia    Chronic atrial fibrillation ([address]    Acute ischemic stroke ([address]    Aspiration pneumonia of both upper lobes ([address]          INTERVAL HISTORY    Initial Presentation (Admitted 3/28): The patient is D 55 y.o. female presented with presents with concern for CVA.  Transfer from [facility] ED, presented with NIH 21.  Last known well 1200 3/27/18.  Previous records indicate pt found by family w/ AMS, aphasia, Left sided deficits.  PMH of previous CVA, afib as on outpatient Xarelto but pt discontinued a few months.  Previous dysarthria, no reported residual weakness.     Hospital Course:   3/29: Intubated overnight for impending respiratory Failure. Copious secreations, remains on zosyn, zithromax. 3/30: Started on norepinephrine drip. Troponins are trending down. 3/31: remained on pressors, switch to dobutamine from levophed    Last 24h:   No acute events overnight.   Levophed switched to dobutamine yesterday, titrated up to 5 mcgs overnight for goal systolic blood pressure greater than 100. Patient remains intubated, sedated. Patient seen and evaluated at bedside this morning, following simple commands right upper and lower extremity. Overall patient's condition slowly improving.     CURRENT MEDICATIONS:  SCHEDULED MEDICATIONS:   senna  1 tablet Oral Nightly    hydrocortisone sodium succinate PF  100 mg Intravenous Q8H    potassium & sodium phosphates  1 packet Per NG tube 4x Daily    scopolamine  1 patch Transdermal Q72H    aspirin  81 mg Oral Daily    atorvastatin  40 mg Oral Nightly    sodium chloride flush  10 mL Intravenous 2 times per day    famotidine (PEPCID) injection  20 mg Intravenous Daily    azithromycin  500 mg Intravenous Q24H    heparin (porcine)  5,000 Units Subcutaneous 3 times per day    lidocaine   Topical Once    piperacillin-tazobactam  3.375 g Intravenous Q8H     CONTINUOUS INFUSIONS:   DOBUTamine 3 mcg/kg/min (19 0433)    sodium chloride 75 mL/hr at 19 042    propofol Stopped (19 1900)     PRN MEDICATIONS:   sodium chloride flush, magnesium hydroxide, ondansetron, acetaminophen, hydrALAZINE    VITALS:  Temperature Range: Temp: 99.3 °F (37.4 °C) Temp  Av.3 °F (37.4 °C)  Min: 99.3 °F (37.4 °C)  Max: 99.3 °F (37.4 °C)  BP Range: Systolic (60ICK), MKP:065 , Min:66 , UTM:271     Diastolic (09UMS), WWP:04, Min:31, Max:105    Pulse Range: Pulse  Av  Min: 70  Max: 131  Respiration Range: Resp  Av.8  Min: 15  Max: 28  Current Pulse Ox: SpO2: 99 %  24HR Pulse Ox Range: SpO2  Av.9 %  Min: 97 %  Max: 100 %  Patient Vitals for the past 12 hrs:   BP Temp Temp src Pulse Resp SpO2   19 0428 -- -- -- -- 18 99 %   19 0321 -- -- -- 91 15 100 %   19 0300 -- -- -- 88 21 100 %   19 0200 -- -- -- 78 20 99 %   19 0115 -- -- -- 98 22 100 %   19 0100 -- -- -- 109 23 100 %   19 0047 (!) 122/93 -- -- 110 24 99 % 04/01/19 0030 -- -- -- 92 21 100 %   04/01/19 0006 -- -- -- 83 23 98 %   04/01/19 0000 -- -- -- 95 20 99 %   03/31/19 2310 -- -- -- 94 20 99 %   03/31/19 2305 96/78 -- -- 70 16 99 %   03/31/19 2245 -- -- -- 74 18 99 %   03/31/19 2241 -- -- -- 70 18 99 %   03/31/19 2233 (!) 96/45 -- -- 73 19 99 %   03/31/19 2218 (!) 98/50 -- -- 71 19 99 %   03/31/19 2203 (!) 82/31 -- -- 70 19 98 %   03/31/19 2138 (!) 95/41 -- -- 84 21 98 %   03/31/19 2113 (!) 97/43 -- -- 78 22 98 %   03/31/19 2103 (!) 103/35 -- -- 81 23 99 %   03/31/19 2043 (!) 120/47 -- -- 85 18 99 %   03/31/19 2023 117/69 -- -- 93 20 99 %   03/31/19 2003 112/71 -- -- 98 22 99 %   03/31/19 1953 (!) 124/46 99.3 °F (37.4 °C) Oral 89 20 99 %   03/31/19 1942 113/85 -- -- 82 19 --   03/31/19 1919 -- -- -- 98 17 99 %     Estimated body mass index is 18.62 kg/m² as calculated from the following:    Height as of this encounter: 5' 4\" (1.626 m).     Weight as of this encounter: 108 lb 7.5 oz (49.2 kg).  []<16 Severe malnutrition  []16-16.99 Moderate malnutrition  []17-18.49 Mild malnutrition  [x]18.5-24.9 Normal  []25-29.9 Overweight (not obese)  []30-34.9 Obese class 1 (Low Risk)  []35-39.9 Obese class 2 (Moderate Risk)  []?40 Obese class 3 (High Risk)    RECENT LABS:   Lab Results   Component Value Date    WBC 6.5 04/01/2019    HGB 10.4 (L) 04/01/2019    HCT 32.2 (L) 04/01/2019    PLT See Reflexed IPF Result 04/01/2019    CHOL 111 03/29/2019    TRIG 69 03/29/2019    HDL 57 03/29/2019    ALT 14 03/27/2019    AST 28 03/27/2019     04/01/2019    K 4.1 04/01/2019     (H) 04/01/2019    CREATININE 0.66 04/01/2019    BUN 13 04/01/2019    CO2 20 04/01/2019    INR 1.0 03/28/2019    LABA1C 5.3 02/28/2017     24 HOUR INTAKE/OUTPUT:    Intake/Output Summary (Last 24 hours) at 4/1/2019 0650  Last data filed at 3/31/2019 2000  Gross per 24 hour   Intake 1475 ml   Output --   Net 1475 ml       RADIOLOGY:   Ct Head Wo Contrast    Result Date: 3/29/2019  EXAMINATION: CT OF THE HEAD WITHOUT CONTRAST  3/29/2019 4:46 am TECHNIQUE: CT of the head was performed without the administration of intravenous contrast. Dose modulation, iterative reconstruction, and/or weight based adjustment of the mA/kV was utilized to reduce the radiation dose to as low as reasonably achievable. COMPARISON: CT head done September 28, 2017. HISTORY: ORDERING SYSTEM PROVIDED HISTORY: left hemiparesis possibly acute, eval for acute ischemia (unable to get MRI due to PPM/AICD) TECHNOLOGIST PROVIDED HISTORY: Ordering Physician Provided Reason for Exam: eval for acute ischemia Acuity: Unknown Type of Exam: Unknown FINDINGS: BRAIN/VENTRICLES: Moderate-sized patchy area of hypoattenuation with loss of the gray-white matter interface in the medial right frontal parietal region may relate to acute infarct. Large areas of encephalomalacia in the bilateral cerebral hemispheres are unchanged from the prior study and likely relate to bilateral middle cerebral artery territory infarcts. Small left caudate remote lacunar infarct. No acute intracranial hemorrhage. No mass effect or midline shift. No abnormal extra-axial fluid collection. The basal cisterns are patent. Ill-defined periventricular white matter hypoattenuation, commonly seen in the setting of chronic small vessel ischemic disease. Generalized cerebral and cerebellar volume loss. No hydrocephalus. ORBITS: The visualized portion of the orbits demonstrate no acute abnormality. SINUSES: The visualized paranasal sinuses and mastoid air cells demonstrate no acute abnormality. Partially visualized nasogastric tube. SOFT TISSUES/SKULL:  No acute abnormality of the visualized skull or soft tissues. Redemonstration of hyperostosis frontalis. Moderate-sized patchy area of hypoattenuation in the medial right frontal parietal region suggests acute infarct. No acute intracranial hemorrhage.  Redemonstration of large areas of encephalomalacia in the bilateral cerebral TECHNOLOGIST PROVIDED HISTORY: intubated, possible PNA FINDINGS: Endotracheal tube terminates 2.5 cm above the bernadette. The enteric tube terminates in the large hiatal hernia as before. Left subclavian AICD in place. The cardiac mediastinal contours appear unchanged. Perihilar and basilar interstitial opacities have progressively become more prominent. No pneumothorax identified. Unchanged appearance of the endotracheal and enteric tubes. Large diaphragmatic hernia. Perihilar and basilar interstitial opacities again demonstrated, which may represent atelectasis due to the hernia. Developing interstitial infiltrate is also a consideration. Xr Chest Portable    Result Date: 3/30/2019  EXAMINATION: SINGLE XRAY VIEW OF THE CHEST 3/30/2019 7:01 am COMPARISON: March 28, 2018, March 27, 2018 HISTORY: ORDERING SYSTEM PROVIDED HISTORY: intubated, possible PNA TECHNOLOGIST PROVIDED HISTORY: intubated, possible PNA FINDINGS: Kyphotic positioning is noted. The endotracheal tube terminates 2.5 cm above the bernadette, which is acceptable for kyphotic positioning. The enteric tube terminates in the hiatal hernia. Large hiatal hernia with resulting shadow in the medial right hemithorax and inferior left hemithorax. Mild vascular congestion is noted. No pneumothorax identified. Left subclavian AICD in place. Interstitial opacities have increased in the interval without evidence for edema or focal consolidation. Large diaphragmatic hernia again demonstrated. Endotracheal and enteric tubes appear unchanged. Xr Chest Portable    Result Date: 3/28/2019  EXAMINATION: SINGLE XRAY VIEW OF THE CHEST 3/28/2019 9:05 pm COMPARISON: March 28 HISTORY: ORDERING SYSTEM PROVIDED HISTORY: s/p intubation TECHNOLOGIST PROVIDED HISTORY: s/p intubation FINDINGS: ET tube projects over the mid trachea.   Enteric tube terminates left of midline projecting over lobular high density which is likely contrast in a hiatal hernia or heterogeneous opacities are similar to the prior study. No definite pleural effusion or pneumothorax. Grossly stable cardiomediastinal silhouette and great vessels. Overall, no significant interval change with redemonstration of low left lung volume with elevation of the left hemidiaphragm and bilateral basilar predominant heterogeneous opacities. Correlate for possible hiatal or diaphragmatic hernia. Vascular Carotid Duplex Bilateral    Result Date: 3/29/2019    [facility]  Vascular Carotid Procedure   Patient Name  Vianey Tinoco  Date of Study           03/28/2019                J   Date of Birth 1950  Gender                  Female   Age           81 year(s)  Race                       Room Number   3198   Corporate ID  [id number]  #   Patient Lisa  [de-identified]  #   MR #          [id number]     Sonographer             Gingerlindajonel Puente   Accession #   [id number]   Interpreting Physician  Sara Garcia   Referring                 Referring Physician     Mary Carmona MD  Nurse  Practitioner  Additional Comments added date of procedure. kf Procedure Type of Study:   Cerebral: Carotid, Carotid Scan Bilateral.  Indications for Study:Carotid stenosis. Blood Pressure:Left arm 99/56 mmHg. Patient Status: In Patient. Limitation reason:BP not taken on right arm due to I.V. .  Conclusions   Summary   Mild 16-49% stenosis of the right internal carotid artery. Hemodynamically normal left carotid artery scan. Patent vertebral arteries bilaterally with antegrade flow.    Signature   ----------------------------------------------------------------  Electronically signed by Chel Allen(Interpreting  physician) on 03/29/2019 10:16 AM  ----------------------------------------------------------------  Findings:   Right Impression:                        Left Impression:  The common carotid artery has a smooth   The common carotid artery has a  homogeneous plaque causing a <50% smooth homogeneous plaque causing  stenosis. a <50% stenosis. The carotid bulb has an irregular        The carotid bulb has a smooth  heterogeneous plaque causing a <50%      homogeneous plaque causing a <50%  stenosis. stenosis. The internal carotid artery has a smooth The internal carotid artery is  homogeneous plaque causing a <50%        normal.  stenosis based on velocities. The external carotid artery has a  The external carotid artery has a smooth smooth homogeneous plaque causing  homogeneous plaque causing a <50%        a <50% stenosis. stenosis. The vertebral artery is patent  The vertebral artery is patent with      with antegrade flow. antegrade flow. Allergies   - Allergy: Ace Inhibitors(Drug). - Allergy:Tape(Miscellaneous). - Allergy:*Unlisted(Drug). Comments:beta adrenergic blockers Velocities are measured in cm/s ; Diameters are measured in cm Carotid Right Measurements +------------+-------+-------+--------+-------+------------+---------------+ ! Location    ! PSV    ! EDV    ! Angle   ! RI     !%Stenosis   ! Tortuosity     ! +------------+-------+-------+--------+-------+------------+---------------+ ! Prox CCA    !72.5   !14.9   !60      !0.79   !            !               ! +------------+-------+-------+--------+-------+------------+---------------+ ! Mid CCA     !73.3   !14.1   ! 60      !0.81   !            !               ! +------------+-------+-------+--------+-------+------------+---------------+ ! Dist CCA    !54.1   !11.4   !60      !0.79   !            !               ! +------------+-------+-------+--------+-------+------------+---------------+ ! Bulb        !51.7   !14.1   ! 60      !0.73   !            !               ! +------------+-------+-------+--------+-------+------------+---------------+ ! Prox ICA    !38.1   !8.87   !60      !0.77   ! !               ! +------------+-------+-------+--------+-------+------------+---------------+ ! Mid ICA     !51.5   !18.3   ! 60      !0.64   !            !               ! +------------+-------+-------+--------+-------+------------+---------------+ ! Dist ICA    ! 40.7   !17.3   ! 60      !0.57   !            !               ! +------------+-------+-------+--------+-------+------------+---------------+ ! Prox ECA    !67.6   !10.9   !60      !0.84   !            !               ! +------------+-------+-------+--------+-------+------------+---------------+ ! Vertebral   !31.9   !8.87   !60      !0.72   !            !               ! +------------+-------+-------+--------+-------+------------+---------------+   - There is antegrade vertebral flow noted on the right side. - Additional Measurements:ICAPSV/CCAPSV 0.71. ICAEDV/CCAEDV 1.23. Carotid Left Measurements +------------+-------+-------+--------+-------+------------+---------------+ ! Location    ! PSV    ! EDV    ! Angle   ! RI     !%Stenosis   ! Tortuosity     ! +------------+-------+-------+--------+-------+------------+---------------+ ! Prox CCA    !63.1   !8.76   !60      !0.86   !            !               ! +------------+-------+-------+--------+-------+------------+---------------+ ! Mid CCA     !59.9   !8.41   ! 60      !0.86   !            !               ! +------------+-------+-------+--------+-------+------------+---------------+ ! Dist CCA    !45.9   !9.81   !60      !0.79   !            !               ! +------------+-------+-------+--------+-------+------------+---------------+ ! Bulb        !43.4   !9.11   ! 60      !0.79   !            !               ! +------------+-------+-------+--------+-------+------------+---------------+ ! Prox ICA    ! 34.7   !9.07   !60      !0.74   !            !               ! +------------+-------+-------+--------+-------+------------+---------------+ ! Mid ICA     !39     !13.6   ! 60      !0.65   !            ! ! +------------+-------+-------+--------+-------+------------+---------------+ ! Dist ICA    !41     !9.05   !60      !0.78   !            !               ! +------------+-------+-------+--------+-------+------------+---------------+ ! Prox ECA    !71.3   !9.8    ! 60      !0.86   !            !               ! +------------+-------+-------+--------+-------+------------+---------------+ ! Vertebral   !39.9   !4.74   !60      !0.88   !            !               ! +------------+-------+-------+--------+-------+------------+---------------+   - There is antegrade vertebral flow noted on the left side. - Additional Measurements:ICAPSV/CCAPSV 0.65. ICAEDV/CCAEDV 1.55. Ir Place Ng Tube By Dr Susana Zimmerman    Result Date: 3/28/2019  PROCEDURE: XR PLACE NASOGASTRIC TUBE PHYS 3/28/2019 HISTORY: ORDERING SYSTEM PROVIDED HISTORY: patient has hiatal hernia TECHNOLOGIST PROVIDED HISTORY: patient has hiatal hernia TECHNIQUE: With the patient supine, an 18 Azeri sump tube NG was inserted via the left nares, and manipulated into the intrathoracic stomach, verified with a small contrast injection. CONTRAST: Conray 43-10 mL used. SEDATION: None FLUOROSCOPY DOSE AND TYPE OR TIME AND EXPOSURES: Fluoroscopy time-2.3 minutes D AP-183 FINDINGS: Images show various stages of NG tube placement; following contrast injection, the tip and side hole are located within the stomach with contrast demonstrating the intrathoracic position. Successful fluoroscopy guided NG tube placement. NG tube is ready for use at this time. Ct Chest Abdomen Pelvis W Contrast    Result Date: 3/28/2019  EXAMINATION: CT OF THE CHEST, ABDOMEN, AND PELVIS WITH CONTRAST 3/28/2019 11:41 am TECHNIQUE: CT of the chest, abdomen and pelvis was performed with the administration of intravenous contrast. Multiplanar reformatted images are provided for review.  Dose modulation, iterative reconstruction, and/or weight based adjustment of the mA/kV was utilized to reduce the radiation dose to as low as reasonably achievable. COMPARISON: CT abdomen and pelvis 12/19/2017 HISTORY: ORDERING SYSTEM PROVIDED HISTORY: abdominal distension, hiatal hernia, possible pneumonia TECHNOLOGIST PROVIDED HISTORY: Ordering Physician Provided Reason for Exam: abd pain Acuity: Unknown Type of Exam: Unknown FINDINGS: Chest: Mediastinum: The heart and great vessels appear unchanged. Left subclavian pacing device present. No enlarged or suspicious-appearing lymph nodes. No pericardial effusion. Large left diaphragmatic hernia is noted which extends into the right hemithorax. Lungs/pleura: Scattered airspace disease in the posterior right upper lobe with interstitial and ground-glass nodules are noted. Chronic but more prominent opacities in the visualized left lower lobe are noted as well. This may in part represent compressive atelectasis. Scattered interstitial and ground-glass opacities are also more prominent in the right lung base with findings of compressive atelectasis. Debris is present in the trachea and proximal right mainstem bronchus. No effusion. Soft Tissues/Bones: No acute osseous abnormality identified. Abdomen/Pelvis: Organs: Cholelithiasis without CT evidence for acute cholecystitis. The liver, pancreas, spleen, adrenals and kidneys reveal no acute findings. Probable scarring in the left renal kidney appears unchanged. Right renal cyst. GI/Bowel: Large left diaphragmatic hernia contains the stomach with organoaxial rotation. Loops of colon and small bowel are also present. The pancreas is also herniated. No inflammatory change or evidence for bowel obstruction. Patulous segment of the sigmoid colon with mild stool burden in the distal colon is a chronic appearance. Pelvis: No acute findings. Excretion of contrast in the bladder is noted. Peritoneum/Retroperitoneum: No free air or free fluid. No lymphadenopathy. The aorta is normal in caliber.   Calcified atheromatous plaque is present. Bones/Soft Tissues: Osteopenia. Advanced facet arthropathy in the lower lumbar spine with grade 1 anterolisthesis of L4. Mild disc disease at L1-2. Thoracolumbar levoscoliosis is noted. Moderate degenerative change in the hips. Scattered interstitial and ground-glass opacities are more prominent in the lungs bilaterally. Debris in the trachea and right mainstem bronchus is also noted. Constellation of findings are suspicious for aspiration pneumonitis. Large left diaphragmatic hernia, as seen previously. Organo-axial rotation of the stomach is noted. Chronic findings, as above. Cta Head W Contrast    Result Date: 3/28/2019  Ordering Provider: [facility]  Name:   Eder Augustine         : 1950  Unit #:   [id number]         Age:  81  Account #:     [de-identified]      Attending Physician:     Pt Location:   ER   Date of Service:   19    CTA HEAD W/ CONTRAST; CTA NECK W/ CONTRAST    HISTORY:  Stroke-like symptoms       Study:  CTA of the head and neck with contrast dated 2019.  2236 hours       TECHNIQUE:   Axial images were obtained through the brain after the administration of 100   ml Visipaque 320. Coronal, sagittal, and 3-D reconstruction imaging was   performed. Individualized dose optimization techniques were utilized. After   this, axial images were obtained from the base of the skull down to the   aortic arch. 100 ml Visipaque 320 was injected. Coronal, sagittal, and 3-D   reconstruction imaging was performed. Individualized dose optimization   techniques were utilized. Findings: The common carotid arteries are normal in caliber and free of   plaque. The carotid bulbs and internal carotid arteries are normal in caliber without   evidence of significant plaque or stenosis.        Within the brain, cavernous carotids, supraclinoid internal carotids, middle   cerebral and anterior cerebral branches are normal in caliber without   evidence of stricture, aneurysm or vascular malformation. There is decreased   vascular flow in the location of the large middle cerebral territory infarcts   which are old. Basilar, posterior cerebral and superior cerebellar branches are normal in   caliber without evidence of aneurysm or vascular malformation. The aorta and pulmonary arteries are normal in caliber. Eventration of the   left hemidiaphragm is present. The stomach is largely intrathoracic. Associated basilar atelectasis is present on the left. IMPRESSION:   Unremarkable CTA of the head. Unremarkable CTA of the neck. EXAM/ORDER VERIFICATION: Y  PT/FAMILY VERBALIZES UNDERSTANDING OF EXAM Y  PT SHIELDED N  Is pt pregnant? LMP   TECH INITIALS MV               COMMENTS       Electronically Signed by:  Adia Casper M.D.  03/28/19 1133  ________________________________________  Adia Casper M.D. Date Dict:  03/28/19 Tito Fan M.D. Date Trans: 03/28/19 0828 [facility]    [id number]    cc: Chris WESTON D. ORacheal        Labs and Images reviewed with:  [] Dr. Vikki Melgoza. Joe    [] Dr. Seb Stark  [] Dr. Camden Pederson  [] There are no new interval images to review. PHYSICAL EXAM       CONSTITUTIONAL:  No acute distress. Intubated. Opening eyes but is a, following some commands and right    HEAD:  normocephalic, atraumatic    EYES:  PERRLA, EOMI.   ENT:  moist mucous membranes   NECK:  supple, symmetric,    LUNGS:  Equal air entry bilaterally, decrease in secretions    CARDIOVASCULAR:  normal s1 / s2,   ABDOMEN:  Soft, no rigidity   NEUROLOGIC:  Mental Status:  Awake, spontaneously opening eyes, following some commands and right side of body only. Cranial Nerves:    III: Pupils:  equal, round, reactive to light    Motor Exam:    Tone: Flaccid on left upper extremity.     Motor exam/5 right upper please contact Neuro Critical Care.       Darren Lucero,   Neuro Critical Care  Pager [phone]  4/1/2019     6:50 AM

## 2019-04-01 NOTE — PROGRESS NOTES
Nutrition Assessment (Enteral Nutrition)    Type and Reason for Visit: Reassess    Nutrition Recommendations:   - Continue TF of Vital AF 1.2 (semi-elemental) at goal rate of 40 mL/hr. Monitor TF tolerance/adequacy of intakes - modify as needed. - Monitor bowel function. Nutrition Assessment: Pt remains intubated. RN states pt tolerating TF well at goal rate with minimal residuals. Pt having some loose stools - RN reports likely d/t bowel regimen. Malnutrition Assessment:  · Malnutrition Status: Insufficient data  · Context: Acute illness or injury  · Findings of the 6 clinical characteristics of malnutrition (Minimum of 2 out of 6 clinical characteristics is required to make the diagnosis of moderate or severe Protein Calorie Malnutrition based on AND/ASPEN Guidelines):  1. Energy Intake-Unable to assess, Currently meeting estimated needs with Tube Feedings    2. Weight Loss-10% loss or greater, in 6 months  3. Fat Loss-Unable to assess  4. Muscle Loss-Unable to assess  5.  Fluid Accumulation-No significant fluid accumulation    Nutrition Risk Level: High    Nutrition Needs:  · Estimated Daily Total Kcal: 25-28 kcal/ek=7713-5741 kcal  · Estimated Daily Protein (g): 1.2-1.4 g/kg=65-75 g    Nutrition Diagnosis:   · Problem: Inadequate oral intake  · Etiology: related to Impaired respiratory function-inability to consume food     Signs and symptoms:  as evidenced by NPO status due to medical condition, Intubation, Nutrition support - EN    Objective Information:  · Wound Type: Stage II, Pressure Ulcer  · Current Nutrition Therapies:  · Oral Diet Orders: NPO   · Tube Feeding (TF) Orders:   · Feeding Route: Nasogastric  · Formula: Semi-elemental  · Rate (ml/hr):40 mL/hr    · Volume (ml/day): 960 mL/day  · Duration: Continuous  · Current TF & Flush Orders Provides: 1152 kcal and 72 gm pro/day  · Goal TF & Flush Orders Provides: 1152 kcal and 72 gm pro/day  · Anthropometric Measures:  · Ht: 5' 4\" (162.6 cm)   · Current Body Wt: 108 lb 7.5 oz (49.2 kg)  · Admission Body Wt: 108 lb (49 kg)  · Weight Change: 10.7% x 6 months   · Ideal Body Wt: 120 lb (54.4 kg), % Ideal Body 90%  · BMI Classification: BMI 18.5 - 24.9 Normal Weight    Nutrition Interventions:   Continue current Tube Feeding  Continued Inpatient Monitoring, Education Not Indicated    Nutrition Evaluation:   · Evaluation: Progressing toward goals   · Goals: meet more than 75% of nutrition needs   · Monitoring: TF Intake, TF Tolerance, Skin Integrity, Wound Healing, Weight, Pertinent Labs, Monitor Bowel Function    Electronically signed by Chavo Gunn RD, LD on 4/1/19 at 11:24 AM    Contact Number: [phone]

## 2019-04-01 NOTE — PROGRESS NOTES
implantable cardioverter-defibrillator (ICD) placement     Hiatal hernia     Chronic atrial fibrillation (HCC)      Assessment / Acute Cardiac Problems:   1. PAF   2. Non-ischemic Cardiomyopathy with no signs of volume overload. 3. S/P ICD. 4. Multiple CVAs with non-compliance with anticoagulation. 5. HTN. 6. Hyperlipidemia. Plan of Treatment:   1. PAF. xarelto on hold til Monday per neuro. 2. ECHO reviewed  3. Trops elevated but trending down. New onset lateral T wave changes on EKG. Will start heparin gtt when ok with neuro. On SQ heparin currently. Continue ASA/Statin. Will consider ischemia workup once acute issues resolve. 4. Goal SBP per neuro 110-180. No medications currently. 5. Keep K > 4   6.  Wean trial today per neurology    Electronically signed by ANASTACIO Cabral CNP on 4/1/2019 at 10:48 AM  [address]  [phone]

## 2019-04-02 NOTE — PROGRESS NOTES
Physical Therapy    Facility/Department: [address]  Initial Assessment    NAME: Shanta Wooten  : 1950  MRN: [id number]     Chief Complaint   Patient presents with    Cerebrovascular Accident   The patient is [id number] [id number] y.o. female presented with presents with concern for CVA.  Transfer from [facility] ED, presented with [address].  Last known well 1200 3/27/18.  Previous records indicate pt found by family w/ AMS, aphasia, Left sided deficits.  PMH of previous CVA, afib as on outpatient Xarelto but pt discontinued a few months.  Previous dysarthria, no reported     Date of Service: 2019    Discharge Recommendations:  Continue to assess    Assessment   Body structures, Functions, Activity limitations: Decreased functional mobility ; Decreased endurance;Decreased ROM; Decreased strength  Assessment: PROM UE/LE all planes x10  Prognosis: Good  Decision Making: Low Complexity  REQUIRES PT FOLLOW UP: Yes  Activity Tolerance  Activity Tolerance: Patient Tolerated treatment well       Patient Diagnosis(es): The encounter diagnosis was Stroke-like symptoms. 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   has a past surgical history that includes laparoscopy (1982); Hysterectomy, vaginal (1993); Cardiac defibrillator placement (2008); Cardiac catheterization (01/10/2008);  Gastrostomy tube placement (2014); ileostomy or jejunostomy (2013); and Stomach surgery (2015). Restrictions  Restrictions/Precautions  Restrictions/Precautions: General Precautions  Required Braces or Orthoses?: No  Position Activity Restriction  Other position/activity restrictions: Still intubated upon intial evaluation 04/02  Vision/Hearing  Vision: Within Functional Limits  Hearing: Within functional limits     Subjective  General  Chart Reviewed: Yes  Patient assessed for rehabilitation services?: Yes  Response To Previous Treatment: Not applicable  Family / Caregiver Present: No  Follows Commands: Within Functional Limits(Intubated but alert and able to follow most commands. )  Subjective  Subjective: RN agreeable to PT. Pain Screening  Patient Currently in Pain: Denies  Vital Signs  Patient Currently in Pain: Denies       Orientation  Orientation  Overall Orientation Status: (Unable to assess. )  Social/Functional History  Social/Functional History  Lives With: Alone  Type of Home: Apartment  Home Layout: One level  ADL Assistance: Independent  Homemaking Assistance: Independent  Ambulation Assistance: Independent  Transfer Assistance: Independent  Additional Comments: History obtained from RN note. PROM RLE (degrees)  RLE PROM: WFL  PROM LLE (degrees)  LLE PROM: WFL  PROM RUE (degrees)  RUE PROM: WFL  PROM LUE (degrees)  LUE PROM: WFL   PROM UE/LE all planes x10. Pt demonstrates increased resistance to ROM initally LE>RE, however is able to relax after first few repetitions. Rolled towel placed in L hand to prevent contracture of hand. Elbow extender in place on left elbow, pt able to reach ~5deg from full elbow extension with PROM.          Sensation  Overall Sensation Status: WFL(Pt able to detect light touch of UE/LE)      Plan   Plan  Times per week: 2-3x/week  Current Treatment Recommendations: ROM  Safety Devices  Type of devices: Call light within reach, Nurse notified  Restraints  Initially in place: No(Mitten on R hand, elbow extender on L elbow)      AM-PAC Score     AM-PAC Inpatient Mobility without Stair Climbing Raw Score : 5  AM-PAC Inpatient without Stair Climbing T-Scale Score : 23.59  Mobility Inpatient CMS 0-100% Score: 100  Mobility Inpatient without Stair CMS G-Code Modifier : CN       Goals  Short term goals  Short term goal 1: Prevent contractures and maintain ROM  Short term goal 2: Pt to actively assist ROM of UE/LE  Short term goal 3: Progress mobility as appropriate        Therapy Time   Individual Concurrent Group Co-treatment   Time In 0834         Time Out 0858         Minutes 24          Total treatment time: 20       PHIL Natarajan  The above documentation was reviewed and accepted by Jaylyn Lentz, Physical Therapist.

## 2019-04-02 NOTE — PLAN OF CARE
Problem: HEMODYNAMIC STATUS  Goal: Patient has stable vital signs and fluid balance  Outcome: Ongoing     Problem: ACTIVITY INTOLERANCE/IMPAIRED MOBILITY  Goal: Mobility/activity is maintained at optimum level for patient  4/2/2019 1022 by Alexei Zimmer RCP  Outcome: Completed     Problem: COMMUNICATION IMPAIRMENT  Goal: Ability to express needs and understand communication  4/2/2019 1022 by Alexei Zimmer RCP  Outcome: Completed     Problem: Risk for Impaired Skin Integrity  Goal: Tissue integrity - skin and mucous membranes  Description  Structural intactness and normal physiological function of skin and  mucous membranes.   Outcome: Ongoing     Problem: Falls - Risk of:  Goal: Will remain free from falls  Description  Will remain free from falls  Outcome: Ongoing

## 2019-04-02 NOTE — PROGRESS NOTES
Order obtained for extubation. SpO2 of 98 on 30% FiO2. Patient extubated and placed on 4 liters/min via nasal cannula. Post extubation SpO2 is 99% with HR  83 bpm and RR 24 breaths/min. Patient had strong cough that was productive of yellow sputum. Extubation Well tolerated by patient. .   Breath Sounds: clear    Newton Capes Black   10:11 AM

## 2019-04-02 NOTE — PROGRESS NOTES
Daily Progress Note  Neuro Critical Care    Patient Name: Sangeeta Jung  Patient : 1950  Room/Bed: [facility]/3530-59  Allergies: Allergies   Allergen Reactions    Other Shortness Of Breath     Sawdust.       Ace Inhibitors Other (See Comments)     Hypotension, dizziness.  Beta Adrenergic Blockers Other (See Comments)     Hypotension, dizziness.  Adhesive Tape Rash     Problem List:   Patient Active Problem List   Diagnosis    Elevated fasting glucose    Nonischemic cardiomyopathy (HCC)    Hyperlipidemia    Anxiety    History of tobacco abuse    Fibrocystic breast disease    Mild mitral regurgitation    Sick sinus syndrome (HCC)    Automatic implantable cardioverter-defibrillator in situ    Cerebral infarction ([address]    Superior mesenteric vein thrombosis    Functional urinary incontinence    Expressive aphasia    Stroke-like symptoms    Acute encephalopathy    History of cerebral infarction    History of implantable cardioverter-defibrillator (ICD) placement    Hiatal hernia    Chronic atrial fibrillation ([address]    Acute ischemic stroke ([address]    Aspiration pneumonia of both upper lobes ([address]            INTERVAL HISTORY        Initial Presentation (Admitted 3/28): The patient is L 67 y.o. female presented with presents with concern for CVA.  Transfer from [facility] ED, presented with NIH 21.  Last known well 1200 3/27/18.  Previous records indicate pt found by family w/ AMS, aphasia, Left sided deficits.  PMH of previous CVA, afib as on outpatient Xarelto but pt discontinued a few months.  Previous dysarthria, no reported residual weakness.   [facility] Course:   3/29: Intubated overnight for impending respiratory Failure. Copious secreations, remains on zosyn, zithromax.   3/30: Started on norepinephrine drip. Troponins are trending down. 3/31: remained on pressors, switch to dobutamine from levophed   :   No acute events overnight.   Levophed switched to dobutamine the orbits demonstrate no acute abnormality. SINUSES: The visualized paranasal sinuses and mastoid air cells demonstrate no acute abnormality. Partially visualized nasogastric tube. SOFT TISSUES/SKULL:  No acute abnormality of the visualized skull or soft tissues. Redemonstration of hyperostosis frontalis. Moderate-sized patchy area of hypoattenuation in the medial right frontal parietal region suggests acute infarct. No acute intracranial hemorrhage. Redemonstration of large areas of encephalomalacia in the bilateral cerebral hemispheres which are similar to the prior study and likely relate to old bilateral middle cerebral artery territory infarct. Chronic small vessel ischemic disease. Small left caudate remote lacunar infarct. Ct Head Wo Contrast    Result Date: 3/28/2019  Ordering Provider: [address] [place] [address]  Name:   Jin Plaza         : 1950  Unit #:   [id number]         Age:  81  Account #:     [de-identified]      Attending Physician:     Pt Location:   ER   Date of Service:   19    CT HEAD W/O CONTRAST    HISTORY:  Stroke-like symptoms. Study:  Head CT without contrast dated 2019.  2137 hours       TECHNIQUE: Axial images were obtained through the brain without IV   contrast. Images were viewed in soft tissue and bone windows. Individualized   dose optimization techniques were used for the CT scan. Findings:  Large bilateral old frontal lobe infarcts are present. The   ventricles and cisternal spaces are normal in size and configuration without   midline shift or mass effect. There is no hemorrhage or hematoma. No acute   parenchymal abnormalities. IMPRESSION:   Large old bilateral middle cerebral territory infarcts. No acute brain   abnormalities. EXAM/ORDER VERIFICATION: Y  PT/FAMILY VERBALIZES UNDERSTANDING OF EXAM Y  PT SHIELDED N  Is pt pregnant?     LMP   TECH INITIALS MV COMMENTS       Electronically Signed by:  Uriel Gallegos M.D.  03/28/19 0825  ________________________________________  Urile Gallegos M.D. Date Dict:  03/28/19 Anne Marie Rios M.D. Date Trans: 03/28/19 0822 [facility]    [id number]    cc: Gaylen Curie MD Kern Greenspan D. Rex Brunner R D. O. Xr Chest Portable    Result Date: 4/1/2019  EXAMINATION: SINGLE XRAY VIEW OF THE CHEST 4/1/2019 6:13 am COMPARISON: March 31, 2018, March 30, 2018 HISTORY: ORDERING SYSTEM PROVIDED HISTORY: intubated, possible PNA TECHNOLOGIST PROVIDED HISTORY: intubated, possible PNA FINDINGS: The endotracheal tube is in appropriate position above the bernadette. The enteric tube terminates in the stomach, within the large diaphragmatic hernia. Scattered interstitial opacities in the lungs are again noted without appreciable change. Large diaphragmatic hernia again noted with areas of compressive atelectasis. No pneumothorax or new airspace disease. The cardiac and mediastinal contours appear unchanged. Left subclavian AICD in place. The endotracheal tube remains in appropriate position. The enteric tube appears unchanged within the stomach, located in the a large diaphragmatic hernia. Scattered interstitial opacities are again noted without appreciable change or new airspace disease in the interval.     Xr Chest Portable    Result Date: 3/31/2019  EXAMINATION: SINGLE XRAY VIEW OF THE CHEST 3/31/2019 7:04 am COMPARISON: March 30, 2018, March 28, 2018 HISTORY: ORDERING SYSTEM PROVIDED HISTORY: intubated, possible PNA TECHNOLOGIST PROVIDED HISTORY: intubated, possible PNA FINDINGS: Endotracheal tube terminates 2.5 cm above the bernadette. The enteric tube terminates in the large hiatal hernia as before. Left subclavian AICD in place. The cardiac mediastinal contours appear unchanged. Perihilar and basilar interstitial opacities have progressively become more prominent. No pneumothorax identified.      Unchanged appearance of the endotracheal and enteric tubes. Large diaphragmatic hernia. Perihilar and basilar interstitial opacities again demonstrated, which may represent atelectasis due to the hernia. Developing interstitial infiltrate is also a consideration. Xr Chest Portable    Result Date: 3/30/2019  EXAMINATION: SINGLE XRAY VIEW OF THE CHEST 3/30/2019 7:01 am COMPARISON: March 28, 2018, March 27, 2018 HISTORY: ORDERING SYSTEM PROVIDED HISTORY: intubated, possible PNA TECHNOLOGIST PROVIDED HISTORY: intubated, possible PNA FINDINGS: Kyphotic positioning is noted. The endotracheal tube terminates 2.5 cm above the bernadette, which is acceptable for kyphotic positioning. The enteric tube terminates in the hiatal hernia. Large hiatal hernia with resulting shadow in the medial right hemithorax and inferior left hemithorax. Mild vascular congestion is noted. No pneumothorax identified. Left subclavian AICD in place. Interstitial opacities have increased in the interval without evidence for edema or focal consolidation. Large diaphragmatic hernia again demonstrated. Endotracheal and enteric tubes appear unchanged. Xr Chest Portable    Result Date: 3/28/2019  EXAMINATION: SINGLE XRAY VIEW OF THE CHEST 3/28/2019 9:05 pm COMPARISON: March 28 HISTORY: ORDERING SYSTEM PROVIDED HISTORY: s/p intubation TECHNOLOGIST PROVIDED HISTORY: s/p intubation FINDINGS: ET tube projects over the mid trachea. Enteric tube terminates left of midline projecting over lobular high density which is likely contrast in a hiatal hernia or sequela of diaphragmatic hernia. Basilar airspace disease on the left is noted with poor visualization of the hemidiaphragm. ET tube as described. Probable left basilar airspace disease.  Hiatal hernia versus diaphragmatic hernia which obscures the left hemidiaphragm and left lung base     Xr Chest Portable    Result Date: 3/28/2019  Ordering Provider: [id number] [facility]  Name: José Miguel Rockwell         : 1950  Unit #:   [id number]         Age:  81  Account #:     [de-identified]      Attending Physician:     Pt Location:   ER   Date of Service:   19    CHEST - PORTABLE    HISTORY:  Unresponsive. Left-sided body weakness. History of strokes     Study:  Portable chest dated 2019.  2219 hours     Findings:  Pacer and defibrillator wires are evident. The stomach is  largely intrathoracic. Eventration of the diaphragm is evident. Basilar  atelectasis is present. The rest of the lungs are clear     IMPRESSION:    Very large hiatal hernia. Basilar atelectasis. EXAM/ORDER VERIFICATION: Y  PT/FAMILY VERBALIZES UNDERSTANDING OF EXAM Y  PT SHIELDED Y  Is pt pregnant? LMP   TECH INITIALS MV               COMMENTS       Electronically Signed by:  Ajay Moyer M.D.  19 1133  ________________________________________  Ajay Moyer M.D. Date Dict:  19 Leif Rock M.D. Date Trans: 19 0839 [facility]    [phone]    cc: Verne Boas MD Evelyn Grave D. Delrae Roan R D. O. Xr Chest Portable    Result Date: 3/28/2019  EXAMINATION: SINGLE XRAY VIEW OF THE CHEST 3/28/2019 5:55 am COMPARISON: 2019. 2016. HISTORY: ORDERING SYSTEM PROVIDED HISTORY: Concern for aspiriation TECHNOLOGIST PROVIDED HISTORY: Concern for aspiriation FINDINGS: Stable left pectoral trans venous cardiac pacer/ICD. Low left lung volume with elevation of the left hemidiaphragm. Bilateral basilar predominant heterogeneous opacities are similar to the prior study. No definite pleural effusion or pneumothorax. Grossly stable cardiomediastinal silhouette and great vessels. Overall, no significant interval change with redemonstration of low left lung volume with elevation of the left hemidiaphragm and bilateral basilar predominant heterogeneous opacities. Correlate for possible hiatal or diaphragmatic hernia. Vascular Carotid Duplex Bilateral    Result Date: 3/29/2019    [facility]  Vascular Carotid Procedure   Patient Name  Chris Meyer  Date of Study           03/28/2019                J   Date of Birth 1950  Gender                  Female   Age           81 year(s)  Race                       Room Number   0704   Corporate ID  [id number]  #   Patient Lisa  [de-identified]  #   MR #          [id number]     Sonographer             Юлия Perez   Accession #   [id number]   Interpreting Physician  Drew Goldberg   Referring                 Referring Physician     Kenna Craig MD  Nurse  Practitioner  Additional Comments added date of procedure. kf Procedure Type of Study:   Cerebral: Carotid, Carotid Scan Bilateral.  Indications for Study:Carotid stenosis. Blood Pressure:Left arm 99/56 mmHg. Patient Status: In Patient. Limitation reason:BP not taken on right arm due to I.V. .  Conclusions   Summary   Mild 16-49% stenosis of the right internal carotid artery. Hemodynamically normal left carotid artery scan. Patent vertebral arteries bilaterally with antegrade flow. Signature   ----------------------------------------------------------------  Electronically signed by Rosie Allen(Interpreting  physician) on 03/29/2019 10:16 AM  ----------------------------------------------------------------  Findings:   Right Impression:                        Left Impression:  The common carotid artery has a smooth   The common carotid artery has a  homogeneous plaque causing a <50%        smooth homogeneous plaque causing  stenosis. a <50% stenosis. The carotid bulb has an irregular        The carotid bulb has a smooth  heterogeneous plaque causing a <50%      homogeneous plaque causing a <50%  stenosis. stenosis.    The internal carotid artery has a smooth The internal carotid artery is  homogeneous plaque causing a <50% normal.  stenosis based on velocities. The external carotid artery has a  The external carotid artery has a smooth smooth homogeneous plaque causing  homogeneous plaque causing a <50%        a <50% stenosis. stenosis. The vertebral artery is patent  The vertebral artery is patent with      with antegrade flow. antegrade flow. Allergies   - Allergy: Ace Inhibitors(Drug). - Allergy:Tape(Miscellaneous). - Allergy:*Unlisted(Drug). Comments:beta adrenergic blockers Velocities are measured in cm/s ; Diameters are measured in cm Carotid Right Measurements +------------+-------+-------+--------+-------+------------+---------------+ ! Location    ! PSV    ! EDV    ! Angle   ! RI     !%Stenosis   ! Tortuosity     ! +------------+-------+-------+--------+-------+------------+---------------+ ! Prox CCA    !72.5   !14.9   !60      !0.79   !            !               ! +------------+-------+-------+--------+-------+------------+---------------+ ! Mid CCA     !73.3   !14.1   ! 60      !0.81   !            !               ! +------------+-------+-------+--------+-------+------------+---------------+ ! Dist CCA    !54.1   !11.4   !60      !0.79   !            !               ! +------------+-------+-------+--------+-------+------------+---------------+ ! Bulb        !51.7   !14.1   ! 60      !0.73   !            !               ! +------------+-------+-------+--------+-------+------------+---------------+ ! Prox ICA    !38.1   !8.87   !60      !0.77   !            !               ! +------------+-------+-------+--------+-------+------------+---------------+ ! Mid ICA     !51.5   !18.3   ! 60      !0.64   !            !               ! +------------+-------+-------+--------+-------+------------+---------------+ ! Dist ICA    ! 40.7   !17.3   ! 60      !0.57   !            !               ! +------------+-------+-------+--------+-------+------------+---------------+ ! Prox ECA    !67.6   !10.9   !60      !0.84   !            !               ! +------------+-------+-------+--------+-------+------------+---------------+ ! Vertebral   !31.9   !8.87   !60      !0.72   !            !               ! +------------+-------+-------+--------+-------+------------+---------------+   - There is antegrade vertebral flow noted on the right side. - Additional Measurements:ICAPSV/CCAPSV 0.71. ICAEDV/CCAEDV 1.23. Carotid Left Measurements +------------+-------+-------+--------+-------+------------+---------------+ ! Location    ! PSV    ! EDV    ! Angle   ! RI     !%Stenosis   ! Tortuosity     ! +------------+-------+-------+--------+-------+------------+---------------+ ! Prox CCA    !63.1   !8.76   !60      !0.86   !            !               ! +------------+-------+-------+--------+-------+------------+---------------+ ! Mid CCA     !59.9   !8.41   ! 60      !0.86   !            !               ! +------------+-------+-------+--------+-------+------------+---------------+ ! Dist CCA    !45.9   !9.81   !60      !0.79   !            !               ! +------------+-------+-------+--------+-------+------------+---------------+ ! Bulb        !43.4   !9.11   ! 60      !0.79   !            !               ! +------------+-------+-------+--------+-------+------------+---------------+ ! Prox ICA    ! 34.7   !9.07   !60      !0.74   !            !               ! +------------+-------+-------+--------+-------+------------+---------------+ ! Mid ICA     !39     !13.6   ! 60      !0.65   !            !               ! +------------+-------+-------+--------+-------+------------+---------------+ ! Dist ICA    !41     !9.05   !60      !0.78   !            !               ! +------------+-------+-------+--------+-------+------------+---------------+ ! Prox ECA    !71.3   !9.8    ! 60      !0.86   !            !               ! +------------+-------+-------+--------+-------+------------+---------------+ ! Vertebral   !39.9   !4.74   !60      !0.88   !            !               ! +------------+-------+-------+--------+-------+------------+---------------+   - There is antegrade vertebral flow noted on the left side. - Additional Measurements:ICAPSV/CCAPSV 0.65. ICAEDV/CCAEDV 1.55. Ir Place Ng Tube By Dr Melinda Patel    Result Date: 3/28/2019  PROCEDURE: XR PLACE NASOGASTRIC TUBE PHYS 3/28/2019 HISTORY: ORDERING SYSTEM PROVIDED HISTORY: patient has hiatal hernia TECHNOLOGIST PROVIDED HISTORY: patient has hiatal hernia TECHNIQUE: With the patient supine, an 18 Irish sump tube NG was inserted via the left nares, and manipulated into the intrathoracic stomach, verified with a small contrast injection. CONTRAST: Conray 43-10 mL used. SEDATION: None FLUOROSCOPY DOSE AND TYPE OR TIME AND EXPOSURES: Fluoroscopy time-2.3 minutes D AP-183 FINDINGS: Images show various stages of NG tube placement; following contrast injection, the tip and side hole are located within the stomach with contrast demonstrating the intrathoracic position. Successful fluoroscopy guided NG tube placement. NG tube is ready for use at this time. Ct Chest Abdomen Pelvis W Contrast    Result Date: 3/28/2019  EXAMINATION: CT OF THE CHEST, ABDOMEN, AND PELVIS WITH CONTRAST 3/28/2019 11:41 am TECHNIQUE: CT of the chest, abdomen and pelvis was performed with the administration of intravenous contrast. Multiplanar reformatted images are provided for review. Dose modulation, iterative reconstruction, and/or weight based adjustment of the mA/kV was utilized to reduce the radiation dose to as low as reasonably achievable.  COMPARISON: CT abdomen and pelvis 12/19/2017 HISTORY: ORDERING SYSTEM PROVIDED HISTORY: abdominal distension, hiatal hernia, possible pneumonia TECHNOLOGIST PROVIDED HISTORY: Ordering Physician Provided Reason for Exam: abd pain Acuity: Unknown Type of Exam: prominent in the lungs bilaterally. Debris in the trachea and right mainstem bronchus is also noted. Constellation of findings are suspicious for aspiration pneumonitis. Large left diaphragmatic hernia, as seen previously. Organo-axial rotation of the stomach is noted. Chronic findings, as above. Cta Head W Contrast    Result Date: 3/28/2019  Ordering Provider: [facility]  Name:   Artemio Clayton         : 1950  Unit #:   [id number]         Age:  81  Account #:     [de-identified]      Attending Physician:     Pt Location:   ER   Date of Service:   19    CTA HEAD W/ CONTRAST; CTA NECK W/ CONTRAST    HISTORY:  Stroke-like symptoms       Study:  CTA of the head and neck with contrast dated 2019.  2236 hours       TECHNIQUE:   Axial images were obtained through the brain after the administration of 100   ml Visipaque 320. Coronal, sagittal, and 3-D reconstruction imaging was   performed. Individualized dose optimization techniques were utilized. After   this, axial images were obtained from the base of the skull down to the   aortic arch. 100 ml Visipaque 320 was injected. Coronal, sagittal, and 3-D   reconstruction imaging was performed. Individualized dose optimization   techniques were utilized. Findings: The common carotid arteries are normal in caliber and free of   plaque. The carotid bulbs and internal carotid arteries are normal in caliber without   evidence of significant plaque or stenosis. Within the brain, cavernous carotids, supraclinoid internal carotids, middle   cerebral and anterior cerebral branches are normal in caliber without   evidence of stricture, aneurysm or vascular malformation. There is decreased   vascular flow in the location of the large middle cerebral territory infarcts   which are old.        Basilar, posterior cerebral and superior cerebellar branches are normal in   caliber without evidence of aneurysm or vascular malformation. The aorta and pulmonary arteries are normal in caliber. Eventration of the   left hemidiaphragm is present. The stomach is largely intrathoracic. Associated basilar atelectasis is present on the left. IMPRESSION:   Unremarkable CTA of the head. Unremarkable CTA of the neck. EXAM/ORDER VERIFICATION: Y  PT/FAMILY VERBALIZES UNDERSTANDING OF EXAM Y  PT SHIELDED N  Is pt pregnant? LMP   TECH INITIALS MV               COMMENTS       Electronically Signed by:  Cliff Matute M.D.  03/28/19 1133  ________________________________________  Cliff Matute M.D. Date Dict:  03/28/19 Caleb Monreal M.D. Date Trans: 03/28/19 0828 [facility]    [phone]    cc: Jame CANO        Labs and Images reviewed with:  [] Dr. Landon Miller. Joe    [] Dr. Soo Lowery  [x] Dr. Trisha Mckeon  [] There are no new interval images to review. PHYSICAL EXAM         CONSTITUTIONAL:  No acute distress. Intubated. Opening eyes but is a, following some commands and right    HEAD:  normocephalic, atraumatic    EYES:  PERRLA, EOMI.   ENT:  moist mucous membranes   NECK:  supple, symmetric,    LUNGS:  Equal air entry bilaterally, decrease in secretions    CARDIOVASCULAR:  normal s1 / s2,   ABDOMEN:  Soft, no rigidity   NEUROLOGIC:  Mental Status:  Awake, spontaneously opening eyes, following some commands and right side of body only. Cranial Nerves:    III: Pupils:  equal, round, reactive to light     Motor Exam:    Tone: Flaccid on left upper extremity.     Motor exam:5/5 right upper and lower extremity  Left hemiplegia, however withdraws to pain LLE but doesn't on LUE        Clonus:  absent                     DRAINS:  [x] There are no drains for Neuro Critical Care to monitor at this time.      ASSESSMENT AND PLAN:       This is A 89 y.o. female transfer from [facility] ED after

## 2019-04-02 NOTE — PROGRESS NOTES
hours.  Diagnostics:    Cardiac Testing      ICD generator change 11/25/13: [employer] AK     CATH 1/10/08: NICM. EF 30%     Dr. Jarret Piedra -- 2/28/19   Nonischemic cardiomyopathy, chronic systolic HF. COMPENSATED  AICD IN SITU   HTN  Multiple CVAs. A/C WITH XARELTO    ECHO 3/28/19  Summary  Left ventricle is normal in size. Global left ventricular systolic function  is moderately reduced. Estimated ejection fraction is 35 % . The apex and apical segments are hypokinetic. Grade I (mild) left ventricular diastolic dysfunction. Pacemaker / ICD lead seen in right atrium and ventricle. Thickened mitral valve leaflets. Mild mitral regurgitation. Mild tricuspid regurgitation. Estimated right ventricular systolic pressure is 25 mmHg. Objective:   Vitals: BP (!) 116/57   Pulse 78   Temp 97.5 °F (36.4 °C) (Oral)   Resp 19   Ht 5' 4\" (1.626 m)   Wt 108 lb 7.5 oz (49.2 kg)   SpO2 98%   BMI 18.62 kg/m²   General appearance: intubated  HEENT: Head: Normocephalic, no lesions, without obvious abnormality.   Neck: no JVD, trachea midline, no adenopathy  Lungs: Clear to auscultation  Heart: Regular rate and rhythm, s1/s2 auscultated, no murmurs  Abdomen: soft, non-tender, bowel sounds active  Extremities: no edema  Neurologic: not done      Patient Active Problem List:     Elevated fasting glucose     Nonischemic cardiomyopathy (HCC)     Hyperlipidemia     Anxiety     History of tobacco abuse     Fibrocystic breast disease     Mild mitral regurgitation     Sick sinus syndrome (HCC)     Automatic implantable cardioverter-defibrillator in situ     Cerebral infarction (HCC)     Superior mesenteric vein thrombosis     Functional urinary incontinence     Expressive aphasia     Stroke-like symptoms     Acute encephalopathy     History of cerebral infarction     History of implantable cardioverter-defibrillator (ICD) placement     Hiatal hernia     Chronic atrial fibrillation (HCC)      Assessment / Acute Cardiac Problems:

## 2019-04-03 PROBLEM — E44.0 MODERATE MALNUTRITION (HCC): Chronic | Status: ACTIVE | Noted: 2019-01-01

## 2019-04-03 NOTE — PROCEDURES
INSTRUMENTAL SWALLOW REPORT  MODIFIED BARIUM SWALLOW    NAME: Shanta Wooten   : 1950  MRN: [id number]       Date of Eval: 4/3/2019              Referring Diagnosis(es):      Past Medical History:  has a past medical history of Anxiety, Blurred vision, bilateral, Bradycardia, Chronic renal insufficiency, Constipation, Depression, Elevated fasting glucose, Encephalomalacia, Fibrocystic breast disease, Frontal headache, Genital atrophy of female, Grief reaction, Hiatal hernia, History of tobacco abuse, Hyperlipidemia, Ileus ([address] Mild mitral regurgitation, Nonischemic cardiomyopathy ([address] Overweight, Paraesophageal hiatal hernia, Sick sinus syndrome ([address] Stroke ([address] Superior mesenteric vein thrombosis, and Tinnitus of right ear. Past Surgical History:  has a past surgical history that includes laparoscopy (1982); Hysterectomy, vaginal (1993); Cardiac defibrillator placement (2008); Cardiac catheterization (01/10/2008); Gastrostomy tube placement (2014); ileostomy or jejunostomy (2013); and Stomach surgery (). Current Diet Solid Consistency: NPO  Current Diet Liquid Consistency: NPO       Type of Study: Initial MBS         Patient Complaints/Reason for Referral:  Shanta Wooten was referred for a MBS to assess the efficiency of his/her swallow function, assess for aspiration, and to make recommendations regarding safe dietary consistencies, effective compensatory strategies, and safe eating environment.        Onset of problem:     81year-old patient admitted to neuro ICU with reported new onset left-sided weakness and speech deficits  History of bilateral ischemic strokes- unclear baseline neuro status at this time  History of heart disease including CAD, status post ICD placement and atrial fibrillation- not on oral anticoagulation currently     CT head is study not suggestive of acute ischemic large lesion, old areas of bilateral encephalomalacia is likely from previous strokes seen. CT angios study without any acute large vessel occlusion. ICP device reportedly not compatible with MRI study.      On exam, can open eyes , confused and reaching to different objects with right upper extremity nonsensically, absent meningeal signs, left hemiparesis. Plan to get continuous EEG monitoring to rule out subclinical/electrographic seizures.      Chest x-ray reviewed- suggestive of bilateral infiltrates and suggestion of large hiatal hernia. Leukocytosis with elevated pro-calcitonin levels reviewed. Lactic acid is normal.  Continue maintenance fluids. Get CT chest abdomen imaging. Start empirical antibiotics for pneumonia-CAP. UA study. Urology consult for suspected fistula. Patient is on aspirin and statins. Start DVT prophylaxis. Plan to reach out to patient's family to get better understanding of her baseline neuro status and overall health condition     Total initial CCT spent 85 minutes excluding procedural time       Behavior/Cognition/Vision/Hearing:  Behavior/Cognition: Alert  Vision: Impaired    Impressions: Pt. With severe oral and pharyngeal phase dysphagia with + gross aspiration and non-productive cough. Recommend NPO with alternative means of nutrition. Patient Position: Lateral and Patient Degrees: 90      Consistencies Administered: Nectar  teaspoon       Postural Changes and/or Swallow Maneuvers Trialed: Upright 90 degrees     Recommended Diet:  Solid consistency: NPO  Liquid consistency: NPO       Medication administration: Via NG/PEG        Recommendations/Treatment  Requires SLP Intervention: Yes     Recommendations comment: Therapy after discharge       Recommended Exercises:    Therapeutic Interventions: Vital Stim/NMES;Pharyngeal exercises; Laryngeal exercises; Tongue base strengthening    Education: Images and recommendations were reviewed with pt following this exam.   Patient Education: yes  Patient Education Response: No evidence of learning    Prognosis  Prognosis for safe diet advancement: guarded  Duration/Frequency of Treatment  Duration/Frequency of Treatment: 2-3X/week      Goals:    Long Term:     To Maximize safety with intake, optimize nutrition/hydration and minimize risk for aspiration. Short Term:     Goal 1: Trial O/M treamtnet program for dysphagia      Oral Preparation / Oral Phase  Oral Phase: Impaired  Oral Phase: decreased bolus formation with spillover BOT with severe swallow delay    Pharyngeal Phase  Pharyngeal Phase: Impaired  Pharyngeal: Nectar: + spillover into airway with aspiration before the swallow, modertae residual, + nonproductive cough. Decreased epiglottic inversion, decreased cricopharyngeal opening and decreased BOT strength    Dysphagia Outcome Severity Scale: Level 1: Severe dysphagia- NPO. Unable to tolerate any PO safely  Penetration-Aspiration Scale (PAS): 7 - Material enters the airway, passes below the vocal folds, and is not ejected from the trachea despite effort    Esophageal Phase  Esophageal Screen: Impaired  Upper Esophageal Screen- Major Contributing Deficits  Reduced Cricopharyngeal Opening:  All        Pain   Patient Currently in Pain: No  Pain Level: 0      Therapy Time:   Individual Concurrent Group Co-treatment   Time In 1100         Time Out 1115         Minutes [address] [facility], 4/3/2019, 1:09 PM

## 2019-04-03 NOTE — PROGRESS NOTES
[facility]   Progress Note                   Date:   4/3/2019  Patient name: Goldie Chandler  Date of admission:  3/28/2019  1:05 AM  MRN:   [id number]  YOB: 1950  PCP: Jennifer Curiel DO    Reason for Admission: Stroke-like symptom [R29.90]    Subjective:       Clinical Changes / Abnormalities: Pt seen and examined at bedside with RN. Doing well post-extubation. Scheduled for video swallow study today. Moving right side with strong had grasp on command. New stroke noted. Tele reviewed -AV demand paced      Medications:   Scheduled Meds:   azithromycin  500 mg Intravenous Q24H    furosemide  20 mg Intravenous Once    midodrine  10 mg Oral TID WC    rivaroxaban  20 mg Oral Daily    senna  1 tablet Oral Nightly    aspirin  81 mg Oral Daily    atorvastatin  40 mg Oral Nightly    sodium chloride flush  10 mL Intravenous 2 times per day    lidocaine   Topical Once    piperacillin-tazobactam  3.375 g Intravenous Q8H     Continuous Infusions:   DOBUTamine Stopped (04/02/19 1830)     CBC:   Recent Labs     04/01/19 0420 04/02/19 0330 04/03/19  0519   WBC 6.5 6.6 12.4*   HGB 10.4* 9.6* 11.8*   PLT See Reflexed IPF Result See Reflexed IPF Result 229     BMP:    Recent Labs     04/01/19 0420 04/02/19 0330 04/03/19  0519    144 143   K 4.1 3.7 3.7   * 112* 109*   CO2 20 22 20   BUN 13 19 31*   CREATININE 0.66 0.72 0.86   GLUCOSE 156* 142* 137*     Hepatic:   Recent Labs     04/03/19  0519   AST 51*   ALT 35*   BILITOT 0.52   ALKPHOS 60     Troponin:   No results for input(s): TROPHS in the last 72 hours. BNP: No results for input(s): BNP in the last 72 hours. Lipids:   No results for input(s): CHOL, HDL in the last 72 hours. Invalid input(s): LDLCALCU  INR:   No results for input(s): INR in the last 72 hours. Diagnostics:    Cardiac Testing      ICD generator change 11/25/13: [employer] AK     CATH 1/10/08: NICM.  EF 30%     Dr. Carri Gan -- 2/28/19   Nonischemic cardiomyopathy, chronic systolic HF. COMPENSATED  AICD IN SITU   HTN  Multiple CVAs. A/C WITH XARELTO    ECHO 3/28/19  Summary  Left ventricle is normal in size. Global left ventricular systolic function  is moderately reduced. Estimated ejection fraction is 35 % . The apex and apical segments are hypokinetic. Grade I (mild) left ventricular diastolic dysfunction. Pacemaker / ICD lead seen in right atrium and ventricle. Thickened mitral valve leaflets. Mild mitral regurgitation. Mild tricuspid regurgitation. Estimated right ventricular systolic pressure is 25 mmHg. Objective:   Vitals: /72   Pulse 72   Temp 98.3 °F (36.8 °C) (Oral)   Resp 22   Ht 5' 4\" (1.626 m)   Wt 108 lb 7.5 oz (49.2 kg)   SpO2 93%   BMI 18.62 kg/m²   General appearance: intubated  HEENT: Head: Normocephalic, no lesions, without obvious abnormality. Neck: no JVD, trachea midline, no adenopathy  Lungs: Clear to auscultation right side, LLL with fine rales and ronchi throughout left side. No distress. On NC oxygen  Heart: Regular rate and rhythm, s1/s2 auscultated, no murmurs. AV demand paced with PVC  Abdomen: soft, non-tender, bowel sounds active  Extremities: no edema  Neurologic: not done      Patient Active Problem List:     Elevated fasting glucose     Nonischemic cardiomyopathy (HCC)     Hyperlipidemia     Anxiety     History of tobacco abuse     Fibrocystic breast disease     Mild mitral regurgitation     Sick sinus syndrome (HCC)     Automatic implantable cardioverter-defibrillator in situ     Cerebral infarction ([address]     Superior mesenteric vein thrombosis     Functional urinary incontinence     Expressive aphasia     Stroke-like symptoms     Acute encephalopathy     History of cerebral infarction     History of implantable cardioverter-defibrillator (ICD) placement     Hiatal hernia     Chronic atrial fibrillation (HCC)      Assessment / Acute Cardiac Problems:   1. PAF   2.  Non-ischemic Cardiomyopathy with no signs of volume overload. 3. S/P ICD. 4. Multiple CVAs with non-compliance with anticoagulation. 5. HTN. 6. Hyperlipidemia. Plan of Treatment:   1. PAF. xarelto Restarted. 2. Trops elevated but trending down. New onset lateral T wave changes on EKG. Continue ASA/Statin. Will plan for stress test in AM. Reviewed with patient and RN. I discussed with patient's sister yesterday and niece the day before. RN to let family know to be present in AM for consent. Hold TF after midnight  3. Goal SBP per neuro 110-180. Off Dobutamine off since yesterday. 4. Keep K > 4   5.  Follow    Electronically signed by ANASTACIO Diaz CNP on 4/3/2019 at 10:08 [id number] [facility].  [phone]

## 2019-04-03 NOTE — PROGRESS NOTES
Daily Progress Note  Neuro Critical Care    Patient Name: Juana Graham  Patient : 1950  Room/Bed: 9019/8446-14  Allergies: Allergies   Allergen Reactions    Other Shortness Of Breath     Sawdust.       Ace Inhibitors Other (See Comments)     Hypotension, dizziness.  Beta Adrenergic Blockers Other (See Comments)     Hypotension, dizziness.  Adhesive Tape Rash     Problem List:   Patient Active Problem List   Diagnosis    Elevated fasting glucose    Nonischemic cardiomyopathy (HCC)    Hyperlipidemia    Anxiety    History of tobacco abuse    Fibrocystic breast disease    Mild mitral regurgitation    Sick sinus syndrome (HCC)    Automatic implantable cardioverter-defibrillator in situ    Cerebral infarction ([facility]    Superior mesenteric vein thrombosis    Functional urinary incontinence    Expressive aphasia    Stroke-like symptoms    Acute encephalopathy    History of cerebral infarction    History of implantable cardioverter-defibrillator (ICD) placement    Hiatal hernia    Chronic atrial fibrillation (HCC)    Acute ischemic stroke ([facility]    Aspiration pneumonia of both upper lobes [facility])       INTERVAL HISTORY        Hospital Course:  Initial Presentation (Admitted 3/28): The patient is T 87 y.o. female presented with presents with concern for CVA.  Transfer from [facility] ED, presented with NIH 21.  Last known well 1200 3/27/18.  Previous records indicate pt found by family w/ AMS, aphasia, Left sided deficits.  PMH of previous CVA, afib as on outpatient Xarelto but pt discontinued a few months.  Previous dysarthria, no reported residual weakness.   [facility] Course:   3/29: Intubated overnight for impending respiratory Failure. Copious secreations, remains on zosyn, zithromax.   3/30: Started on norepinephrine drip. Troponins are trending down.   3/31: remained on pressors, switch to dobutamine from levophed   :   No acute events overnight.  Levophed switched to dobutamine yesterday, titrated up to 5 mcgs overnight for goal systolic blood pressure greater than 100.  Patient remains intubated, sedated.  Patient seen and evaluated at bedside this morning, following simple commands right upper and lower extremity.  Overall patient's condition slowly improving.     :  No acute overnight events, more responsive and follows commnads, MAP above 70 as per a-line measurements, heart rhythm showed a.fib on dobtumaine running at 6 jan, afebrile, hbg 10.4 on  today 9.6, mg 1.8 and potassium 3.7 , replacement ordered to keep mg above 2 and potassium above 4. Cardiology on board for pvc,had BW today. Patient extubated  today doing well on Nc, will c/w zoysn for another day Zithromax d/c and midodrine increased to 10 mg and to wean off dobutimine           Last 24 hours:  Patient desaturate to 91 % was placed on a non breather and later on bipap and saturation improved. however cxr showed worsening opacities R>L,  therefore chest Pt started zithromax resumed and lasix 20 mg x1 given, off dobutamine and doing well neurologically. On tube feeds , swallow eval ordered didn't pass video ordered    MEDICATIONS:     CURRENT MEDICATIONS:  SCHEDULED MEDICATIONS:   midodrine  10 mg Oral TID WC    rivaroxaban  20 mg Oral Daily    senna  1 tablet Oral Nightly    scopolamine  1 patch Transdermal Q72H    aspirin  81 mg Oral Daily    atorvastatin  40 mg Oral Nightly    sodium chloride flush  10 mL Intravenous 2 times per day    famotidine (PEPCID) injection  20 mg Intravenous Daily    lidocaine   Topical Once    piperacillin-tazobactam  3.375 g Intravenous Q8H     CONTINUOUS INFUSIONS:   DOBUTamine Stopped (19 183)    sodium chloride 50 mL/hr at 19     PRN MEDICATIONS:   potassium chloride, fentanNYL, sodium chloride flush, magnesium hydroxide, ondansetron, acetaminophen, hydrALAZINE    VITALS 24 Hours     Temperature Range: Temp: 99.4 °F (37.4 °C) Temp  Av.3 °F (36.8 °C)  Min: 97.5 °F (36.4 °C)  Max: 99.4 °F (37.4 °C)  BP Range: Systolic (24HIZ), POM:494 , Min:62 , FMU:748     Diastolic (13MPH), NZP:10, Min:21, Max:119    Pulse Range: Pulse  Av.3  Min: 72  Max: 104  Respiration Range: Resp  Av.8  Min: 10  Max: 36  Current Pulse Ox: SpO2: 98 %  24HR Pulse Ox Range: SpO2  Av.4 %  Min: 91 %  Max: 99 %  Patient Vitals for the past 12 hrs:   BP Temp Pulse Resp SpO2   19 0605 (!) 140/118 -- 90 21 98 %   19 0505 (!) 144/63 -- 84 22 96 %   19 0405 133/61 99.4 °F (37.4 °C) 84 20 98 %   19 0305 -- -- 79 18 97 %   19 0257 -- -- 92 23 97 %   19 0237 -- -- 72 22 96 %   19 0205 131/82 -- 83 23 96 %   19 0105 (!) 133/57 -- 79 29 96 %   19 0005 (!) 156/110 -- 85 25 93 %   19 2305 131/61 -- 86 24 94 %   19 2205 122/62 -- 74 22 95 %   19 2105 123/65 -- 74 24 91 %   19 (!) 156/34 97.7 °F (36.5 °C) 80 22 95 %   19 1917 122/85 -- 73 23 95 %     Estimated body mass index is 18.62 kg/m² as calculated from the following:    Height as of this encounter: 5' 4\" (1.626 m).     Weight as of this encounter: 108 lb 7.5 oz (49.2 kg).  []<16 Severe malnutrition  []16-16.99 Moderate malnutrition  []17-18.49 Mild malnutrition  [x]18.5-24.9 Normal  []25-29.9 Overweight (not obese)  []30-34.9 Obese class 1 (Low Risk)  []35-39.9 Obese class 2 (Moderate Risk)  []?40 Obese class 3 (High Risk)      PHYSICAL EXAM         CONSTITUTIONAL:  No acute distress.  Intubated.  Opening eyes but is a, following some commands and right    HEAD:  normocephalic, atraumatic    EYES:  PERRLA, EOMI.   ENT:  moist mucous membranes   NECK:  supple, symmetric,    LUNGS:  Equal air entry bilaterally, decrease in secretions    CARDIOVASCULAR:  normal s1 / s2,   ABDOMEN:  Soft, no rigidity   NEUROLOGIC:  Mental Status:  Awake, spontaneously opening eyes, following some commands and right side of body only.     Cranial Nerves:    III: Pupils:  equal, round, reactive to light     Motor Exam:    Tone: Flaccid on left upper extremity.     Motor exam:5/5 right upper and lower extremity  Left hemiplegia, however withdraws to pain LLE but doesn't on LUE        Clonus:  absent                           INTAKE/OUTPUT & DRAINS   24 HOUR INTAKE/OUTPUT:    Intake/Output Summary (Last 24 hours) at 4/3/2019 0707  Last data filed at 4/3/2019 0600  Gross per 24 hour   Intake 1818 ml   Output 725 ml   Net 1093 ml       DRAINS:  [x] There are no drains for Neuro Critical Care to monitor at this time.    LABS      Recent Results (from the past 24 hour(s))   CBC WITH AUTO DIFFERENTIAL    Collection Time: 04/03/19  5:19 AM   Result Value Ref Range    WBC 12.4 (H) 3.5 - 11.3 k/uL    RBC 3.88 (L) 3.95 - 5.11 m/uL    Hemoglobin 11.8 (L) 11.9 - 15.1 g/dL    Hematocrit 37.8 36.3 - 47.1 %    MCV 97.4 82.6 - 102.9 fL    MCH 30.4 25.2 - 33.5 pg    MCHC 31.2 28.4 - 34.8 g/dL    RDW 14.0 11.8 - 14.4 %    Platelets 375 454 - 527 k/uL    MPV 11.6 8.1 - 13.5 fL    NRBC Automated 0.0 0.0 per 100 WBC    Differential Type NOT REPORTED     Seg Neutrophils PENDING %    Lymphocytes PENDING %    Monocytes PENDING %    Eosinophils % PENDING %    Basophils PENDING %    Immature Granulocytes PENDING 0 %    Segs Absolute PENDING k/uL    Absolute Lymph # PENDING k/uL    Absolute Mono # PENDING k/uL    Absolute Eos # PENDING k/uL    Basophils # PENDING 0.0 - 0.2 k/uL    Absolute Immature Granulocyte PENDING 0.00 - 0.30 k/uL    WBC Morphology NOT REPORTED     RBC Morphology NOT REPORTED     Platelet Estimate NOT REPORTED    MAGNESIUM    Collection Time: 04/03/19  5:19 AM   Result Value Ref Range    Magnesium 2.3 1.6 - 2.6 mg/dL   PHOSPHORUS    Collection Time: 04/03/19  5:19 AM   Result Value Ref Range    Phosphorus 4.1 2.6 - 4.5 mg/dL   Comprehensive Metabolic Panel    Collection Time: 04/03/19  5:19 AM   Result Value Ref Range    Glucose 137 (H) 70 - 99 mg/dL    BUN 31 (H) 8 No abnormal extra-axial fluid collection. The basal cisterns are patent. Ill-defined periventricular white matter hypoattenuation, commonly seen in the setting of chronic small vessel ischemic disease. Generalized cerebral and cerebellar volume loss. No hydrocephalus. ORBITS: The visualized portion of the orbits demonstrate no acute abnormality. SINUSES: The visualized paranasal sinuses and mastoid air cells demonstrate no acute abnormality. Partially visualized nasogastric tube. SOFT TISSUES/SKULL:  No acute abnormality of the visualized skull or soft tissues. Redemonstration of hyperostosis frontalis. Moderate-sized patchy area of hypoattenuation in the medial right frontal parietal region suggests acute infarct. No acute intracranial hemorrhage. Redemonstration of large areas of encephalomalacia in the bilateral cerebral hemispheres which are similar to the prior study and likely relate to old bilateral middle cerebral artery territory infarct. Chronic small vessel ischemic disease. Small left caudate remote lacunar infarct. Ct Head Wo Contrast    Result Date: 3/28/2019  Ordering Provider: [address] [place] [address]  Name:   Ismael Reynaga         : 1950  Unit #:   [id number]         Age:  81  Account #:     [de-identified]      Attending Physician:     Pt Location:   ER   Date of Service:   19    CT HEAD W/O CONTRAST    HISTORY:  Stroke-like symptoms. Study:  Head CT without contrast dated 2019.  2137 hours       TECHNIQUE: Axial images were obtained through the brain without IV   contrast. Images were viewed in soft tissue and bone windows. Individualized   dose optimization techniques were used for the CT scan. Findings:  Large bilateral old frontal lobe infarcts are present. The   ventricles and cisternal spaces are normal in size and configuration without   midline shift or mass effect. There is no hemorrhage or hematoma.   No acute   parenchymal abnormalities. IMPRESSION:   Large old bilateral middle cerebral territory infarcts. No acute brain   abnormalities. EXAM/ORDER VERIFICATION: Y  PT/FAMILY VERBALIZES UNDERSTANDING OF EXAM Y  PT SHIELDED N  Is pt pregnant? LMP   TECH INITIALS MV               COMMENTS       Electronically Signed by:  Nyla Marrero M.D.  19 0825  ________________________________________  Nyla Marrero M.D. Date Dict:  19 Zahra Ingram M.D. Date Trans: 19 0822 [facility]    [id number]    cc: Brantley Prader MD Venetta Kohl D. Paulo Pimenta R D. O. Xr Chest Portable    Result Date: 3/28/2019  EXAMINATION: SINGLE XRAY VIEW OF THE CHEST 3/28/2019 9:05 pm COMPARISON:  HISTORY: ORDERING SYSTEM PROVIDED HISTORY: s/p intubation TECHNOLOGIST PROVIDED HISTORY: s/p intubation FINDINGS: ET tube projects over the mid trachea. Enteric tube terminates left of midline projecting over lobular high density which is likely contrast in a hiatal hernia or sequela of diaphragmatic hernia. Basilar airspace disease on the left is noted with poor visualization of the hemidiaphragm. ET tube as described. Probable left basilar airspace disease. Hiatal hernia versus diaphragmatic hernia which obscures the left hemidiaphragm and left lung base     Xr Chest Portable    Result Date: 3/28/2019  Ordering Provider: [facility]  Name:   Jillian Combs         : 1950  Unit #:   [id number]         Age:  81  Account #:     [de-identified]      Attending Physician:     Pt Location:   ER   Date of Service:   19    CHEST - PORTABLE    HISTORY:  Unresponsive. Left-sided body weakness. History of strokes     Study:  Portable chest dated 2019.  2219 hours     Findings:  Pacer and defibrillator wires are evident. The stomach is  largely intrathoracic. Eventration of the diaphragm is evident. Basilar  atelectasis is present. The rest of the lungs are clear     IMPRESSION:    Very large hiatal hernia. Basilar atelectasis. EXAM/ORDER VERIFICATION: Y  PT/FAMILY VERBALIZES UNDERSTANDING OF EXAM Y  PT SHIELDED Y  Is pt pregnant? LMP   TECH INITIALS MV               COMMENTS       Electronically Signed by:  Chelsea Villegas M.D.  03/28/19 1133  ________________________________________  Chelsea Villegas M.D. Date Dict:  03/28/19 Vesta Griffin M.D. Date Trans: 03/28/19 0839 [facility]    [id number]    cc: Yesy CANO Xr Chest Portable    Result Date: 3/28/2019  EXAMINATION: SINGLE XRAY VIEW OF THE CHEST 3/28/2019 5:55 am COMPARISON: March 27, 2019. December 25, 2016. HISTORY: ORDERING SYSTEM PROVIDED HISTORY: Concern for aspiriation TECHNOLOGIST PROVIDED HISTORY: Concern for aspiriation FINDINGS: Stable left pectoral trans venous cardiac pacer/ICD. Low left lung volume with elevation of the left hemidiaphragm. Bilateral basilar predominant heterogeneous opacities are similar to the prior study. No definite pleural effusion or pneumothorax. Grossly stable cardiomediastinal silhouette and great vessels. Overall, no significant interval change with redemonstration of low left lung volume with elevation of the left hemidiaphragm and bilateral basilar predominant heterogeneous opacities. Correlate for possible hiatal or diaphragmatic hernia.      Vascular Carotid Duplex Bilateral    Result Date: 3/29/2019    [facility]  Vascular Carotid Procedure   Patient Name  John Abraham  Date of Study           03/28/2019                J   Date of Birth 1950  Gender                  Female   Age           81 year(s)  Race                       Room Number   5623   Corporate ID  [id number]  #   Patient Lisa  [de-identified]  #   MR #          [id number]     Sonographer             Latosha Schwab   Accession #   [id number]   Interpreting Physician  Sari Maurice   Referring                 Referring Physician     Susan Rodríguez MD  Nurse  Practitioner  Additional Comments added date of procedure. kf Procedure Type of Study:   Cerebral: Carotid, Carotid Scan Bilateral.  Indications for Study:Carotid stenosis. Blood Pressure:Left arm 99/56 mmHg. Patient Status: In Patient. Limitation reason:BP not taken on right arm due to I.V. .  Conclusions   Summary   Mild 16-49% stenosis of the right internal carotid artery. Hemodynamically normal left carotid artery scan. Patent vertebral arteries bilaterally with antegrade flow. Signature   ----------------------------------------------------------------  Electronically signed by Ashley Allen(Interpreting  physician) on 03/29/2019 10:16 AM  ----------------------------------------------------------------  Findings:   Right Impression:                        Left Impression:  The common carotid artery has a smooth   The common carotid artery has a  homogeneous plaque causing a <50%        smooth homogeneous plaque causing  stenosis. a <50% stenosis. The carotid bulb has an irregular        The carotid bulb has a smooth  heterogeneous plaque causing a <50%      homogeneous plaque causing a <50%  stenosis. stenosis. The internal carotid artery has a smooth The internal carotid artery is  homogeneous plaque causing a <50%        normal.  stenosis based on velocities. The external carotid artery has a  The external carotid artery has a smooth smooth homogeneous plaque causing  homogeneous plaque causing a <50%        a <50% stenosis. stenosis. The vertebral artery is patent  The vertebral artery is patent with      with antegrade flow. antegrade flow. Allergies   - Allergy: Ace Inhibitors(Drug). - Allergy:Tape(Miscellaneous).    - Allergy:*Unlisted(Drug). Comments:beta adrenergic blockers Velocities are measured in cm/s ; Diameters are measured in cm Carotid Right Measurements +------------+-------+-------+--------+-------+------------+---------------+ ! Location    ! PSV    ! EDV    ! Angle   ! RI     !%Stenosis   ! Tortuosity     ! +------------+-------+-------+--------+-------+------------+---------------+ ! Prox CCA    !72.5   !14.9   !60      !0.79   !            !               ! +------------+-------+-------+--------+-------+------------+---------------+ ! Mid CCA     !73.3   !14.1   ! 60      !0.81   !            !               ! +------------+-------+-------+--------+-------+------------+---------------+ ! Dist CCA    !54.1   !11.4   !60      !0.79   !            !               ! +------------+-------+-------+--------+-------+------------+---------------+ ! Bulb        !51.7   !14.1   ! 60      !0.73   !            !               ! +------------+-------+-------+--------+-------+------------+---------------+ ! Prox ICA    !38.1   !8.87   !60      !0.77   !            !               ! +------------+-------+-------+--------+-------+------------+---------------+ ! Mid ICA     !51.5   !18.3   ! 60      !0.64   !            !               ! +------------+-------+-------+--------+-------+------------+---------------+ ! Dist ICA    ! 40.7   !17.3   ! 60      !0.57   !            !               ! +------------+-------+-------+--------+-------+------------+---------------+ ! Prox ECA    !67.6   !10.9   !60      !0.84   !            !               ! +------------+-------+-------+--------+-------+------------+---------------+ ! Vertebral   !31.9   !8.87   !60      !0.72   !            !               ! +------------+-------+-------+--------+-------+------------+---------------+   - There is antegrade vertebral flow noted on the right side. - Additional Measurements:ICAPSV/CCAPSV 0.71. ICAEDV/CCAEDV 1.23.  Carotid Left Measurements +------------+-------+-------+--------+-------+------------+---------------+ ! Location    ! PSV    ! EDV    ! Angle   ! RI     !%Stenosis   ! Tortuosity     ! +------------+-------+-------+--------+-------+------------+---------------+ ! Prox CCA    !63.1   !8.76   !60      !0.86   !            !               ! +------------+-------+-------+--------+-------+------------+---------------+ ! Mid CCA     !59.9   !8.41   ! 60      !0.86   !            !               ! +------------+-------+-------+--------+-------+------------+---------------+ ! Dist CCA    !45.9   !9.81   !60      !0.79   !            !               ! +------------+-------+-------+--------+-------+------------+---------------+ ! Bulb        !43.4   !9.11   ! 60      !0.79   !            !               ! +------------+-------+-------+--------+-------+------------+---------------+ ! Prox ICA    ! 34.7   !9.07   !60      !0.74   !            !               ! +------------+-------+-------+--------+-------+------------+---------------+ ! Mid ICA     !39     !13.6   ! 60      !0.65   !            !               ! +------------+-------+-------+--------+-------+------------+---------------+ ! Dist ICA    !41     !9.05   !60      !0.78   !            !               ! +------------+-------+-------+--------+-------+------------+---------------+ ! Prox ECA    !71.3   !9.8    ! 60      !0.86   !            !               ! +------------+-------+-------+--------+-------+------------+---------------+ ! Vertebral   !39.9   !4.74   !60      !0.88   !            !               ! +------------+-------+-------+--------+-------+------------+---------------+   - There is antegrade vertebral flow noted on the left side. - Additional Measurements:ICAPSV/CCAPSV 0.65. ICAEDV/CCAEDV 1.55.     Ir Place Ng Tube By Dr Leslie Eldridge    Result Date: 3/28/2019  PROCEDURE: XR PLACE NASOGASTRIC TUBE PHYS 3/28/2019 HISTORY: ORDERING SYSTEM PROVIDED HISTORY: patient has hiatal hernia TECHNOLOGIST PROVIDED HISTORY: patient has hiatal hernia TECHNIQUE: With the patient supine, an 25 Macedonian sump tube NG was inserted via the left nares, and manipulated into the intrathoracic stomach, verified with a small contrast injection. CONTRAST: Conray 43-10 mL used. SEDATION: None FLUOROSCOPY DOSE AND TYPE OR TIME AND EXPOSURES: Fluoroscopy time-2.3 minutes D AP-183 FINDINGS: Images show various stages of NG tube placement; following contrast injection, the tip and side hole are located within the stomach with contrast demonstrating the intrathoracic position. Successful fluoroscopy guided NG tube placement. NG tube is ready for use at this time. Ct Chest Abdomen Pelvis W Contrast    Result Date: 3/28/2019  EXAMINATION: CT OF THE CHEST, ABDOMEN, AND PELVIS WITH CONTRAST 3/28/2019 11:41 am TECHNIQUE: CT of the chest, abdomen and pelvis was performed with the administration of intravenous contrast. Multiplanar reformatted images are provided for review. Dose modulation, iterative reconstruction, and/or weight based adjustment of the mA/kV was utilized to reduce the radiation dose to as low as reasonably achievable. COMPARISON: CT abdomen and pelvis 12/19/2017 HISTORY: ORDERING SYSTEM PROVIDED HISTORY: abdominal distension, hiatal hernia, possible pneumonia TECHNOLOGIST PROVIDED HISTORY: Ordering Physician Provided Reason for Exam: abd pain Acuity: Unknown Type of Exam: Unknown FINDINGS: Chest: Mediastinum: The heart and great vessels appear unchanged. Left subclavian pacing device present. No enlarged or suspicious-appearing lymph nodes. No pericardial effusion. Large left diaphragmatic hernia is noted which extends into the right hemithorax. Lungs/pleura: Scattered airspace disease in the posterior right upper lobe with interstitial and ground-glass nodules are noted. Chronic but more prominent opacities in the visualized left lower lobe are noted as well. This may in part represent compressive atelectasis. Scattered interstitial and ground-glass opacities are also more prominent in the right lung base with findings of compressive atelectasis. Debris is present in the trachea and proximal right mainstem bronchus. No effusion. Soft Tissues/Bones: No acute osseous abnormality identified. Abdomen/Pelvis: Organs: Cholelithiasis without CT evidence for acute cholecystitis. The liver, pancreas, spleen, adrenals and kidneys reveal no acute findings. Probable scarring in the left renal kidney appears unchanged. Right renal cyst. GI/Bowel: Large left diaphragmatic hernia contains the stomach with organoaxial rotation. Loops of colon and small bowel are also present. The pancreas is also herniated. No inflammatory change or evidence for bowel obstruction. Patulous segment of the sigmoid colon with mild stool burden in the distal colon is a chronic appearance. Pelvis: No acute findings. Excretion of contrast in the bladder is noted. Peritoneum/Retroperitoneum: No free air or free fluid. No lymphadenopathy. The aorta is normal in caliber. Calcified atheromatous plaque is present. Bones/Soft Tissues: Osteopenia. Advanced facet arthropathy in the lower lumbar spine with grade 1 anterolisthesis of L4. Mild disc disease at L1-2. Thoracolumbar levoscoliosis is noted. Moderate degenerative change in the hips. Scattered interstitial and ground-glass opacities are more prominent in the lungs bilaterally. Debris in the trachea and right mainstem bronchus is also noted. Constellation of findings are suspicious for aspiration pneumonitis. Large left diaphragmatic hernia, as seen previously. Organo-axial rotation of the stomach is noted. Chronic findings, as above.      Cta Head W Contrast    Result Date: 3/28/2019  Ordering Provider: [facility]  Name:   Eder Augustine         : 1950  Unit #:   [id number]         Age:  81  Account #:     [de-identified]      Attending Physician:     Pt Location:   ER   Date of Service:   03/27/19    CTA HEAD W/ CONTRAST; CTA NECK W/ CONTRAST    HISTORY:  Stroke-like symptoms       Study:  CTA of the head and neck with contrast dated 03/27/2019.  2236 hours       TECHNIQUE:   Axial images were obtained through the brain after the administration of 100   ml Visipaque 320. Coronal, sagittal, and 3-D reconstruction imaging was   performed. Individualized dose optimization techniques were utilized. After   this, axial images were obtained from the base of the skull down to the   aortic arch. 100 ml Visipaque 320 was injected. Coronal, sagittal, and 3-D   reconstruction imaging was performed. Individualized dose optimization   techniques were utilized. Findings: The common carotid arteries are normal in caliber and free of   plaque. The carotid bulbs and internal carotid arteries are normal in caliber without   evidence of significant plaque or stenosis. Within the brain, cavernous carotids, supraclinoid internal carotids, middle   cerebral and anterior cerebral branches are normal in caliber without   evidence of stricture, aneurysm or vascular malformation. There is decreased   vascular flow in the location of the large middle cerebral territory infarcts   which are old. Basilar, posterior cerebral and superior cerebellar branches are normal in   caliber without evidence of aneurysm or vascular malformation. The aorta and pulmonary arteries are normal in caliber. Eventration of the   left hemidiaphragm is present. The stomach is largely intrathoracic. Associated basilar atelectasis is present on the left. IMPRESSION:   Unremarkable CTA of the head. Unremarkable CTA of the neck. EXAM/ORDER VERIFICATION: Y  PT/FAMILY VERBALIZES UNDERSTANDING OF EXAM Y  PT SHIELDED N  Is pt pregnant?     LMP   TECH INITIALS MV               COMMENTS       Electronically Signed by:  German Shah M.D. 03/28/19 1133  ________________________________________  Aydee Bender M.D. Date Dict:  03/28/19 Neda Friedman M.D. Date Trans: 03/28/19 0828 [facility]    [id number]    cc: Wiley CANO          Labs and Images reviewed with:  [] Dr. Vijay Holm. Joe     [] Dr. Moose Godwin  [x] Dr. Kenrick Lamas  [] there are no new interval images to review. ASSESSMENT AND PLAN:         This is E 39 y.o. female transfer from [facility] ED after presenting with aphasia, right sided deficits.  Last known well 1200 3/27/19.  PMH of previous CVA with residual dysarthria.  CTH, CTA head/neck negative. Stroke alert called on arrival.         NEUROLOGIC: Left hemiplegia 2/2 acute CVA  - CT head, CTA head, neck obtained and reviewed.  MRI unable to obtain due to AICD/pacer  -CT head  3/29: mod sized are of hypoatteneution medial right frontal parietal suggestive of acute infarct  - Goal SBP 110-180,MAP above 70  - Neuro checks per protocol     CARDIOVASCULAR:  - Goal SBP 110-180  - Dobutamine weaned off  - On Midodrine 10mg TID on 4/2  -ASA, statin  - EKG shows new onset lateral T-wave changes cardiology on board will lauryn ischemia workup once stable as per cardiology  - On Xarelto 20 mg,  - Continue telemetry  - cardiology on board     PULMONARY:  - extubated 4/2  - CXR 4/2- decrease in pleural effusion 4/3 showed worsening opacities R>L , lasix 20 mg x1 given, Zithromax resumed        RENAL/FLUID/ELECTROLYTE:  -Normal renal function  - Monitor urine output  - IVF d/c  - Replace electrolytes PRN, potassium 3.7  As per cardiology need to above 4  - Daily BMP     GI/NUTRITION:  NUTRITION:  Tube feeding at goal  - GI prophylaxis: pepcid       ID:  - Tmax 99, WBC 12.4  - Cultures NGTD  -on zithromax due worsening lung cxr,   - Continue to monitor for fevers  - Daily CBC     HEME:   - H/H: 9.6 dropped from 10.4 on 4/1/30.0 but improved to 11.8 on 4/3  - Daily

## 2019-04-03 NOTE — PROGRESS NOTES
Restriction  Other position/activity restrictions: L side deficits, non-verbal at baseline per RN  Vision/Hearing  Vision: Impaired(L neglect)  Hearing: Within functional limits     Subjective  General  Patient assessed for rehabilitation services?: Yes  Response To Previous Treatment: Patient unable to report, no changes reported from family or staff  Family / Caregiver Present: No  Follows Commands: Impaired(Follows ~90% of one step commands)  Subjective  Subjective: RN agreeable to PT. Pt alert in bed upon arrival, cooperative throughout. Pain Screening  Patient Currently in Pain: Denies  Vital Signs  Patient Currently in Pain: Denies            Social/Functional History  Social/Functional History  Lives With: Alone  Type of Home: Apartment  Home Layout: One level  ADL Assistance: Independent  Homemaking Assistance: Independent  Ambulation Assistance: Independent  Transfer Assistance: Independent  Additional Comments: Per RN, pt has been non-verbal since 2006, resides in assisted living and ambulates independently. Objective             Strength RLE  Comment: Difficult to assess due to decreased ability to follow complex commands. At least 3/5 based on observed functional mobility. Strength LLE  Comment: grossly 0/5  Strength RUE  Comment: Difficult to assess due to decreased ability to follow complex commands. At least 3/5 based on observed functional mobility.   Strength LUE  Comment: grossly 0/5  Tone RLE  RLE Tone: Normotonic  Tone LLE  LLE Tone: Hypotonic     Bed mobility  Supine to Sit: Maximum assistance;2 Person assistance  Sit to Supine: Maximum assistance;2 Person assistance  Scooting: Maximal assistance  Transfers  Sit to Stand: Unable to assess(Due to poor sitting balance)  Ambulation  Ambulation?: No  Stairs/Curb  Stairs?: No     Balance  Posture: Poor  Sitting - Static: Poor;+  Sitting - Dynamic: Poor  Comments: Pt sat EOB ~5 minutes with Mod A initially, progressing to Max A with posterior lean due to fatigue. Exercises  Comments: B calf stretch 30 sec x2. FDS applied to LLE with wear schedule posted. AAROM LUE and LLE 10x in all planes.  AAROM RLE and RUE 10x in all planes     Plan   Plan  Times per week: 5-6x/wk  Current Treatment Recommendations: Strengthening, Balance Training, Functional Mobility Training, Transfer Training, Endurance Training, Patient/Caregiver Education & Training, Safety Education & Training, Home Exercise Program, ROM, Neuromuscular Re-education, Gait Training  Safety Devices  Type of devices: Call light within reach, Nurse notified, Left in bed  Restraints  Initially in place: No      AM-PAC Score  AM-PAC Inpatient Mobility Raw Score : 9  AM-[facility] Inpatient T-Scale Score : 30.55  Mobility Inpatient CMS 0-100% Score: 81.38  Mobility Inpatient CMS G-Code Modifier : CM          Goals  Short term goals  Time Frame for Short term goals: 14 visits  Short term goal 1: Perform bed mobility with Mod A  Short term goal 2: Perform sit to stand transfer with Max A  Short term goal 3: Demo Poor+ dynamic standing balance with appropriate AD  Short term goal 4: Ambulate 30ft with green lift walker and Mod A  Short term goal 5: Participate in 30 minutes of therapy to demo increased endurance       Therapy Time   Individual Concurrent Group Co-treatment   Time In 1319         Time Out 1400         Minutes 41         Timed Code Treatment Minutes: 30 Minutes       Cherie Kinsey PT

## 2019-04-03 NOTE — PROGRESS NOTES
Nutrition Assessment (Enteral Nutrition)    Type and Reason for Visit: Reassess    Nutrition Recommendations:   - Suggest switching TF formula to Jevity 1.2 (std with fiber) goal rate 50 mL/hr = 1440 kcal and 67 gm pro/day. Monitor TF tolerance/adequacy of intakes - modify as needed. - Monitor stools. - Continue NPO - monitor for start of oral diet as appropriate. Nutrition Assessment: Pt extubated yesterday. Video swallow study completed which pt failed - RN reports aspiration present. TF continue at goal rate. RN reports pt continues to have multiple loose stools. Malnutrition Assessment:  · Malnutrition Status: Meets the criteria for moderate malnutrition  · Context: Acute illness or injury  · Findings of the 6 clinical characteristics of malnutrition (Minimum of 2 out of 6 clinical characteristics is required to make the diagnosis of moderate or severe Protein Calorie Malnutrition based on AND/ASPEN Guidelines):  1. Energy Intake-Unable to assess, Currently meeting estimated needs with Tube Feedings    2. Weight Loss-10% loss or greater, in 6 months  3. Fat Loss-Mild subcutaneous fat loss, Orbital  4. Muscle Loss-Moderate muscle mass loss, Clavicles (pectoralis and deltoids)  5. Fluid Accumulation-No significant fluid accumulation    Nutrition Risk Level: High    Nutrition Needs:  · Estimated Daily Total Kcal: 0353-9062 kcal/day  · Estimated Daily Protein (g): 1.2-1.4 g/kg=65-75 g    Nutrition Diagnosis:   · Problem: Inadequate oral intake  · Etiology: related to Difficulty swallowing(Recent Extubation)     Signs and symptoms:  as evidenced by NPO status due to medical condition, Swallow study results, Nutrition support - EN    Objective Information:  · Nutrition-Focused Physical Findings: +1 edema to all extremities.   · Wound Type: Stage II, Pressure Ulcer  · Current Nutrition Therapies:  · Oral Diet Orders: NPO   · Oral Diet intake: NPO  · Tube Feeding (TF) Orders:   · Feeding Route: Nasogastric  · Formula: Semi-elemental  · Rate (ml/hr):40 mL/hr    · Volume (ml/day): 960 mL/day  · Duration: Continuous  · Current TF & Flush Orders Provides: 1152 kcal and 72 gm pro/day  · Goal TF & Flush Orders Provides: Jevity 1.2 (std with fiber) at 50 mL/hr = 1440 kcal and 67 gm pro/day  · Anthropometric Measures:  · Ht: 5' 4\" (162.6 cm)   · Current Body Wt: 108 lb 7.5 oz (49.2 kg)  · Admission Body Wt: 108 lb (49 kg)  · Weight Change: 10.7% x 6 months   · Ideal Body Wt: 120 lb (54.4 kg), % Ideal Body 90%  · BMI Classification: BMI 18.5 - 24.9 Normal Weight    Nutrition Interventions:   Modify current Tube Feeding - Suggest switching to Jevity 1.2 (std with fiber) goal rate 50 mL/hr.   Continued Inpatient Monitoring, Education Not Indicated    Nutrition Evaluation:   · Evaluation: Progressing toward goals   · Goals: meet more than 75% of nutrition needs   · Monitoring: TF Intake, TF Tolerance, Chewing/Swallowing, Nutrition Progression, Weight, Pertinent Labs, Diarrhea, Monitor Hemodynamic Status    Electronically signed by Donovan Rice RD, LD on 4/3/19 at 12:01 PM    Contact Number: [phone]

## 2019-04-04 NOTE — PLAN OF CARE
Problem: OXYGENATION/RESPIRATORY FUNCTION  Goal: Patient will maintain patent airway  Outcome: Ongoing     Problem: OXYGENATION/RESPIRATORY FUNCTION  Goal: Patient will achieve/maintain normal respiratory rate/effort  Description  Respiratory rate and effort will be within normal limits for the patient  Outcome: Ongoing     Problem: SKIN INTEGRITY  Goal: Skin integrity is maintained or improved  Outcome: Ongoing

## 2019-04-04 NOTE — CARE COORDINATION
[address] Transitions Interview     2019    Patient: Pricila Barrera Patient : 1950   MRN: [id number]  Reason for Admission: Stroke-like symptom [R29.90]  RARS: Readmission Risk Score: 19    Attempt to meet with patient for care transitions and discharge planning. Patient resting at time of visit. Family not present in room. Will attempt to meet with family at later time/date for care transitions interview due to patient's non-verbal status. Review of chart notes indicate patient is a resident of [facility] assisted living. Case Management not available at time of attempted visit/interview for collaboration regarding patient. Review of CM notes completed. Noted potential for skilled stay at [facility]. PM&R completed, notes reviewed and appreciated by Dr. Naz Vela. Care Transitions will continue to follow with Chadwick Ryan during her current hospital course. Outreach to the patient/family after discharge by Care Transitions pending her final discharge disposition/plan.      Readmission Risk  Patient Active Problem List   Diagnosis    Elevated fasting glucose    Nonischemic cardiomyopathy ([address]    Hyperlipidemia    Anxiety    History of tobacco abuse    Fibrocystic breast disease    Mild mitral regurgitation    Sick sinus syndrome (HCC)    Automatic implantable cardioverter-defibrillator in situ    Cerebral infarction ([address]    Superior mesenteric vein thrombosis    Functional urinary incontinence    Expressive aphasia    Stroke-like symptoms    Acute encephalopathy    History of cerebral infarction    History of implantable cardioverter-defibrillator (ICD) placement    Hiatal hernia    Chronic atrial fibrillation (HCC)    Acute ischemic stroke ([address]    Aspiration pneumonia of both upper lobes ([address]    Moderate malnutrition ([address]       Follow Up  Future Appointments   Date Time Provider Nia Juarez   2019  9:00 AM SCHEDULE, Bud [address]ros [address] [facility]   2019  3:10 PM Morteza Solis,  DINT DP   8/22/2019 10:15 AM SCHEDULE, PACEMAKER [facility] CARDIOLOGY DCARDIO [facility]   8/22/2019 10:30 AM Ana Rosa Jesus MD [address] [facility]  Health Maintenance Due   Topic Date Due    DEXA (modify frequency per FRAX score)  03/04/2017    A1C test (Diabetic or Prediabetic)  02/28/2018    Breast cancer screen  10/16/2018       Yashira Medel MA

## 2019-04-04 NOTE — CONSULTS
assistance  Supine to Sit: Maximum assistance, 2 Person assistance  Sit to Supine: Maximum assistance, 2 Person assistance  Scooting: Maximal assistance                    Past Medical History:        Diagnosis Date    Anxiety     Blurred vision, bilateral     mild.  Bradycardia     intolerant of beta blockers, intolerant of ACE inhibitors.  Chronic renal insufficiency     Constipation     Depression     Elevated fasting glucose     Encephalomalacia     parietal.     Fibrocystic breast disease     Frontal headache     bilateral, mild, transient.  Genital atrophy of female     Grief reaction     death of mother.  Hiatal hernia     extremely large.  History of tobacco abuse     now quit.  Hyperlipidemia     Ileus (HCC)     Mild mitral regurgitation     Nonischemic cardiomyopathy (HCC)     ejection fraction less than 30%, implantation of ICD.  Overweight     Paraesophageal hiatal hernia     Sick [facility] syndrome ([facility]     Stroke ([facility] 07/19/2006    acute, history of stroke 07/2001 with no significant neurological deficit, thought to be due to transient atrial fibrillation, right sided hemiparesis, aphasia.  Superior mesenteric vein thrombosis     Tinnitus of right ear     constant, since 1989. Past Surgical History:        Procedure Laterality Date    CARDIAC CATHETERIZATION  01/10/2008    significant cardiomyopathy, ejection fraction 30%, global hypokinesis, sinus bradycardia into the 40s transiently, frequent PVCs, otherwise normal.     CARDIAC DEFIBRILLATOR PLACEMENT  04/11/2008    ICD in situ.  GASTROSTOMY TUBE PLACEMENT  8-    attempted peg tube, unsuccessful    HYSTERECTOMY, VAGINAL  09/22/1993    total with BSO for benign tumors.  ILEOSTOMY OR JEJUNOSTOMY  08/30/2013    jejunostomy    LAPAROSCOPY  05/14/1982    with D&C.  STOMACH SURGERY  2015    gastropexy with G-tube holding in place       Allergies:     Allergies   Allergen Reactions  Other Shortness Of Breath     Sawdust.       Ace Inhibitors Other (See Comments)     Hypotension, dizziness.  Beta Adrenergic Blockers Other (See Comments)     Hypotension, dizziness.  Adhesive Tape Rash        Current Medications:   Current Facility-Administered Medications: azithromycin (ZITHROMAX) 500 mg in dextrose 5 % 250 mL IVPB, 500 mg, Intravenous, Q24H  diphenoxylate-atropine (LOMOTIL) liquid 5 mL, 5 mL, Oral, 4x Daily PRN  piperacillin-tazobactam (ZOSYN) 3.375 g in dextrose 5 % 50 mL IVPB (mini-bag), 3.375 g, Intravenous, Q8H  potassium chloride 10 mEq/100 mL IVPB (Peripheral Line), 10 mEq, Intravenous, PRN  midodrine (PROAMATINE) tablet 10 mg, 10 mg, Oral, TID WC  fentaNYL (SUBLIMAZE) injection 25 mcg, 25 mcg, Intravenous, Q1H PRN  rivaroxaban (XARELTO) tablet 20 mg, 20 mg, Oral, Daily  senna (SENOKOT) tablet 8.6 mg, 1 tablet, Oral, Nightly  DOBUTamine (DOBUTREX) 1000 mg in dextrose 5 % 250 mL infusion, 2.5 mcg/kg/min, Intravenous, Continuous  aspirin chewable tablet 81 mg, 81 mg, Oral, Daily  atorvastatin (LIPITOR) tablet 40 mg, 40 mg, Oral, Nightly  sodium chloride flush 0.9 % injection 10 mL, 10 mL, Intravenous, 2 times per day  sodium chloride flush 0.9 % injection 10 mL, 10 mL, Intravenous, PRN  magnesium hydroxide (MILK OF MAGNESIA) 400 MG/5ML suspension 30 mL, 30 mL, Oral, Daily PRN  ondansetron (ZOFRAN) injection 4 mg, 4 mg, Intravenous, Q6H PRN  acetaminophen (TYLENOL) suppository 650 mg, 650 mg, Rectal, Q4H PRN  hydrALAZINE (APRESOLINE) injection 5 mg, 5 mg, Intravenous, Q6H PRN  lidocaine (XYLOCAINE) 2% uro-jet, , Topical, Once    Social History:  Lives with:   Alone in an assisted living apartment 1 level.     Social History     Socioeconomic History    Marital status:      Spouse name: None    Number of children: None    Years of education: None    Highest education level: None   Occupational History    None   Social Needs    Financial resource strain: None   [employer] COMPARISON: CT head done September 28, 2017. HISTORY: ORDERING SYSTEM PROVIDED HISTORY: left hemiparesis possibly acute, eval for acute ischemia (unable to get MRI due to PPM/AICD) TECHNOLOGIST PROVIDED HISTORY: Ordering Physician Provided Reason for Exam: eval for acute ischemia Acuity: Unknown Type of Exam: Unknown FINDINGS: BRAIN/VENTRICLES: Moderate-sized patchy area of hypoattenuation with loss of the gray-white matter interface in the medial right frontal parietal region may relate to acute infarct. Large areas of encephalomalacia in the bilateral cerebral hemispheres are unchanged from the prior study and likely relate to bilateral middle cerebral artery territory infarcts. Small left caudate remote lacunar infarct. No acute intracranial hemorrhage. No mass effect or midline shift. No abnormal extra-axial fluid collection. The basal cisterns are patent. Ill-defined periventricular white matter hypoattenuation, commonly seen in the setting of chronic small vessel ischemic disease. Generalized cerebral and cerebellar volume loss. No hydrocephalus. ORBITS: The visualized portion of the orbits demonstrate no acute abnormality. SINUSES: The visualized paranasal sinuses and mastoid air cells demonstrate no acute abnormality. Partially visualized nasogastric tube. SOFT TISSUES/SKULL:  No acute abnormality of the visualized skull or soft tissues. Redemonstration of hyperostosis frontalis.      Moderate-sized patchy area of hypoattenuation in the medial right frontal parietal region suggests acute infarct. No acute intracranial hemorrhage. Redemonstration of large areas of encephalomalacia in the bilateral cerebral hemispheres which are similar to the prior study and likely relate to old bilateral middle cerebral artery territory infarct. Chronic small vessel ischemic disease. Small left caudate remote lacunar infarct.        CT head 3/28/2019-  IMPRESSION:   Large old bilateral middle cerebral territory infarcts. No acute brain   abnormalities. Chest x-ray 3/28/2019-  Xr Chest Portable     Result Date: 3/28/2019  EXAMINATION: SINGLE XRAY VIEW OF THE CHEST 3/28/2019 9:05 pm COMPARISON: March 28 HISTORY: ORDERING SYSTEM PROVIDED HISTORY: s/p intubation TECHNOLOGIST PROVIDED HISTORY: s/p intubation FINDINGS: ET tube projects over the mid trachea. Enteric tube terminates left of midline projecting over lobular high density which is likely contrast in a hiatal hernia or sequela of diaphragmatic hernia. Basilar airspace disease on the left is noted with poor visualization of the hemidiaphragm.      ET tube as described. Probable left basilar airspace disease. Hiatal hernia versus diaphragmatic hernia which obscures the left hemidiaphragm and left lung base         Diagnostics:    CBC:   Recent Labs     04/02/19 0330 04/03/19 0519 04/04/19 0426   WBC 6.6 12.4* 12.3*   RBC 3.11* 3.88* 3.82*   HGB 9.6* 11.8* 11.7*   HCT 30.0* 37.8 36.0*   MCV 96.5 97.4 94.2   RDW 13.7 14.0 13.7   PLT See Reflexed IPF Result 229 178     BMP:   Recent Labs     04/02/19 0330 04/03/19 0519 04/04/19 0426    143 143   K 3.7 3.7 3.1*   * 109* 106   CO2 22 20 28   PHOS 4.2 4.1 2.9   BUN 19 31* 27*   CREATININE 0.72 0.86 0.78     BNP: No results for input(s): BNP in the last 72 hours. PT/INR: No results for input(s): PROTIME, INR in the last 72 hours. APTT: No results for input(s): APTT in the last 72 hours. CARDIAC ENZYMES: No results for input(s): CKMB, CKMBINDEX, TROPONINT in the last 72 hours.     Invalid input(s): CKTOTAL;3  FASTING LIPID PANEL:  Lab Results   Component Value Date    CHOL 111 03/29/2019    HDL 57 03/29/2019    TRIG 121 04/03/2019     LIVER PROFILE:   Recent Labs     04/03/19 0519   AST 51*   ALT 35*   BILITOT 0.52   ALKPHOS 60          Physical Exam:  /64   Pulse 80   Temp 98.1 °F (36.7 °C) (Axillary)   Resp 20   Ht 5' 4\" (1.626 m)   Wt 108 lb 7.5 oz (49.2 kg)   SpO2 98%   BMI 18.62 kg/m²   Alert, no distress. Nonverbal  Follows commands inconsistently. Lungs - decreased breath sounds no wheezing. Heart regular. Abdomen non-distended, non-tender. No calf tenderness or edema. Sensory: Unable to assess  Motor: Muscle tone and bulk are normal for age bilaterally. Motor strength-unable to complete manual muscle testing consistently due to following commands however patient has good strength and right upper and lower extremity. No active movement seen in left upper extremity. Minimal movement in left lower extremity. Impression:  The patient is a 73-year-old female with a left sided weakness diagnosed with acute infarct CVA    Recommendations:    1. I evaluated the patient today with occupational therapy and observed the interaction. The patient was dependent total for bed mobility and sitting at the edge of the bed. She was not consistently following commands. The patient would not be a candidate for acute inpatient rehab as she would not be able to tolerate 3 hours of therapy per day or follow commands consistently. Continue with physical occupational and speech therapy. 2. Neuro- patient with acute CVA being managed in the neuro ICU. 3. CV- cardiology following patient restarted on Xarelto and manage medically with aspirin and statin and new-onset T-wave changes on EKG. 4. Pulmonary- pneumonia and pulmonary edema. The patient was extubated on 4/2/2019. Being managed medically for worsening opacities on chest x-ray on 4/3/2019 on antibiotics. 5. Anemia- daily CBCs 4 hemoglobin drop. 6. DVT prophylaxis-SCDs in place and on Xarelto. 7. Home support-patient lived alone in an apartment which was assisted living. Unable to determine home support is no family present. It was my pleasure to evaluate Alicia Lozano today. Please call with questions.     Lindy Stephens MD        This note is created with the assistance of a speech recognition program.  While intending to generate a document that actually reflects the content of the visit, the document can still have some errors including those of syntax and sound a like substitutions which may escape proof reading.   In such instances, actual meaning can be extrapolated by contextual diversion.

## 2019-04-04 NOTE — PROGRESS NOTES
overnight.  Levophed switched to dobutamine yesterday, titrated up to 5 mcgs overnight for goal systolic blood pressure greater than 100.  Patient remains intubated, sedated.  Patient seen and evaluated at bedside this morning, following simple commands right upper and lower extremity.  Overall patient's condition slowly improving.     4/2:  No acute overnight events, more responsive and follows commnads, MAP above 70 as per a-line measurements, heart rhythm showed a.fib on dobtumaine running at 6 jan, afebrile, hbg 10.4 on 4/1 today 9.6, mg 1.8 and potassium 3.7 , replacement ordered to keep mg above 2 and potassium above 4. Cardiology on board for pvc,had BW today. Patient extubated  today doing well on Nc, will c/w zoysn for another day Zithromax d/c and midodrine increased to 10 mg and to wean off dobutimine              4/3:  Patient desaturate to 91 % was placed on a non breather and later on bipap and saturation improved. however cxr showed worsening opacities R>L,  therefore chest Pt started zithromax resumed and lasix 20 mg x1 given, off dobutamine and doing well neurologically. On tube feeds , swallow eval ordered didn't pass video ordered        Last 24 hours:  No acute events overnight. Placed on bipap, potassium 3.1 in am labs replete, wbc trending down, cxr showed improvement from prior day but will continue Zithromax. Scheduled for stress test/MECHE as per cardiology , patient failed video swallow eval need to consider alternative means of feedings.     MEDICATIONS:     CURRENT MEDICATIONS:  SCHEDULED MEDICATIONS:   azithromycin  500 mg Intravenous Q24H    piperacillin-tazobactam  3.375 g Intravenous Q8H    midodrine  10 mg Oral TID     rivaroxaban  20 mg Oral Daily    senna  1 tablet Oral Nightly    aspirin  81 mg Oral Daily    atorvastatin  40 mg Oral Nightly    sodium chloride flush  10 mL Intravenous 2 times per day    lidocaine   Topical Once     CONTINUOUS INFUSIONS:   DOBUTamine Stopped (19 1830)     PRN MEDICATIONS:   diphenoxylate-atropine, potassium chloride, fentanNYL, sodium chloride flush, magnesium hydroxide, ondansetron, acetaminophen, hydrALAZINE    VITALS 24 Hours     Temperature Range: Temp: 98.4 °F (36.9 °C) Temp  Av.2 °F (36.8 °C)  Min: 97.4 °F (36.3 °C)  Max: 98.7 °F (37.1 °C)  BP Range: Systolic (07GQM), KI , Min:96 , EST:465     Diastolic (31PKV), JFA:23, Min:45, Max:86    Pulse Range: Pulse  Av.3  Min: 70  Max: 89  Respiration Range: Resp  Av.8  Min: 18  Max: 26  Current Pulse Ox: SpO2: 99 %  24HR Pulse Ox Range: SpO2  Av.8 %  Min: 92 %  Max: 100 %  Patient Vitals for the past 12 hrs:   BP Temp Temp src Pulse Resp SpO2   19 0603 131/68 -- -- 73 18 --   19 0600 -- -- -- 76 20 --   19 0503 120/65 -- -- 83 18 99 %   19 0500 -- -- -- 80 19 --   19 0403 109/79 98.4 °F (36.9 °C) Oral 70 18 100 %   19 0400 -- -- -- 73 18 --   19 0315 -- -- -- 71 19 99 %   19 0303 (!) 96/49 -- -- 76 18 --   19 0300 -- -- -- 81 21 --   19 0203 122/75 -- -- 86 21 98 %   19 0200 -- -- -- 89 19 --   19 0103 133/70 -- -- 86 23 98 %   19 0100 -- -- -- 80 19 --   19 0003 126/77 97.4 °F (36.3 °C) Oral 76 18 98 %   19 0000 -- -- -- 75 20 --   19 -- -- -- 74 22 98 %   19 (!) 123/48 -- -- 83 23 94 %   19 -- -- -- 84 22 --   19 (!) 129/57 -- -- 71 22 93 %   19 -- -- -- -- 24 92 %   19 -- -- -- 77 20 --   19 99/81 -- -- 79 19 97 %   19 -- -- -- 75 24 --   19 118/72 98.2 °F (36.8 °C) Oral 77 19 97 %   19 -- -- -- 78 26 --   19 (!) 144/86 -- -- 83 20 97 %     Estimated body mass index is 18.62 kg/m² as calculated from the following:    Height as of this encounter: 5' 4\" (1.626 m).     Weight as of this encounter: 108 lb 7.5 oz (49.2 kg).  []<16 Severe malnutrition  []16-16.99 Moderate (H) 3.5 - 11.3 k/uL    RBC 3.82 (L) 3.95 - 5.11 m/uL    Hemoglobin 11.7 (L) 11.9 - 15.1 g/dL    Hematocrit 36.0 (L) 36.3 - 47.1 %    MCV 94.2 82.6 - 102.9 fL    MCH 30.6 25.2 - 33.5 pg    MCHC 32.5 28.4 - 34.8 g/dL    RDW 13.7 11.8 - 14.4 %    Platelets 410 783 - 425 k/uL    MPV 11.2 8.1 - 13.5 fL    NRBC Automated 0.0 0.0 per 100 WBC    Differential Type NOT REPORTED     WBC Morphology NOT REPORTED     RBC Morphology NOT REPORTED     Platelet Estimate NOT REPORTED     Seg Neutrophils 78 (H) 36 - 65 %    Lymphocytes 11 (L) 24 - 43 %    Monocytes 9 3 - 12 %    Eosinophils % 0 (L) 1 - 4 %    Basophils 0 0 - 2 %    Immature Granulocytes 1 (H) 0 %    Segs Absolute 9.58 (H) 1.50 - 8.10 k/uL    Absolute Lymph # 1.39 1.10 - 3.70 k/uL    Absolute Mono # 1.11 0.10 - 1.20 k/uL    Absolute Eos # <0.03 0.00 - 0.44 k/uL    Basophils # <0.03 0.00 - 0.20 k/uL    Absolute Immature Granulocyte 0.14 0.00 - 0.30 k/uL   MAGNESIUM    Collection Time: 04/04/19  4:26 AM   Result Value Ref Range    Magnesium 2.0 1.6 - 2.6 mg/dL   PHOSPHORUS    Collection Time: 04/04/19  4:26 AM   Result Value Ref Range    Phosphorus 2.9 2.6 - 4.5 mg/dL         RADIOLOGY   Ct Head Wo Contrast    Result Date: 3/29/2019  EXAMINATION: CT OF THE HEAD WITHOUT CONTRAST  3/29/2019 4:46 am TECHNIQUE: CT of the head was performed without the administration of intravenous contrast. Dose modulation, iterative reconstruction, and/or weight based adjustment of the mA/kV was utilized to reduce the radiation dose to as low as reasonably achievable. COMPARISON: CT head done September 28, 2017.  HISTORY: ORDERING SYSTEM PROVIDED HISTORY: left hemiparesis possibly acute, eval for acute ischemia (unable to get MRI due to PPM/AICD) TECHNOLOGIST PROVIDED HISTORY: Ordering Physician Provided Reason for Exam: eval for acute ischemia Acuity: Unknown Type of Exam: Unknown FINDINGS: BRAIN/VENTRICLES: Moderate-sized patchy area of hypoattenuation with loss of the gray-white matter Physician:     Pt Location:   ER   Date of Service:   03/27/19    CHEST - PORTABLE    HISTORY:  Unresponsive. Left-sided body weakness. History of strokes     Study:  Portable chest dated 03/27/2019.  2219 hours     Findings:  Pacer and defibrillator wires are evident. The stomach is  largely intrathoracic. Eventration of the diaphragm is evident. Basilar  atelectasis is present. The rest of the lungs are clear     IMPRESSION:    Very large hiatal hernia. Basilar atelectasis. EXAM/ORDER VERIFICATION: Y  PT/FAMILY VERBALIZES UNDERSTANDING OF EXAM Y  PT SHIELDED Y  Is pt pregnant? LMP   TECH INITIALS MV               COMMENTS       Electronically Signed by:  Ras Montgomery M.D.  03/28/19 1133  ________________________________________  Ras Montgomery M.D. Date Dict:  03/28/19 Fredy Land M.D. Date Trans: 03/28/19 0839 [facility]    [id number]    cc: Jovany CANO Xr Chest Portable    Result Date: 3/28/2019  EXAMINATION: SINGLE XRAY VIEW OF THE CHEST 3/28/2019 5:55 am COMPARISON: March 27, 2019. December 25, 2016. HISTORY: ORDERING SYSTEM PROVIDED HISTORY: Concern for aspiriation TECHNOLOGIST PROVIDED HISTORY: Concern for aspiriation FINDINGS: Stable left pectoral trans venous cardiac pacer/ICD. Low left lung volume with elevation of the left hemidiaphragm. Bilateral basilar predominant heterogeneous opacities are similar to the prior study. No definite pleural effusion or pneumothorax. Grossly stable cardiomediastinal silhouette and great vessels. Overall, no significant interval change with redemonstration of low left lung volume with elevation of the left hemidiaphragm and bilateral basilar predominant heterogeneous opacities. Correlate for possible hiatal or diaphragmatic hernia.      Vascular Carotid Duplex Bilateral    Result Date: 3/29/2019    [facility]  Vascular Carotid Procedure carotid artery has a smooth smooth homogeneous plaque causing  homogeneous plaque causing a <50%        a <50% stenosis. stenosis. The vertebral artery is patent  The vertebral artery is patent with      with antegrade flow. antegrade flow. Allergies   - Allergy: Ace Inhibitors(Drug). - Allergy:Tape(Miscellaneous). - Allergy:*Unlisted(Drug). Comments:beta adrenergic blockers Velocities are measured in cm/s ; Diameters are measured in cm Carotid Right Measurements +------------+-------+-------+--------+-------+------------+---------------+ ! Location    ! PSV    ! EDV    ! Angle   ! RI     !%Stenosis   ! Tortuosity     ! +------------+-------+-------+--------+-------+------------+---------------+ ! Prox CCA    !72.5   !14.9   !60      !0.79   !            !               ! +------------+-------+-------+--------+-------+------------+---------------+ ! Mid CCA     !73.3   !14.1   ! 60      !0.81   !            !               ! +------------+-------+-------+--------+-------+------------+---------------+ ! Dist CCA    !54.1   !11.4   !60      !0.79   !            !               ! +------------+-------+-------+--------+-------+------------+---------------+ ! Bulb        !51.7   !14.1   ! 60      !0.73   !            !               ! +------------+-------+-------+--------+-------+------------+---------------+ ! Prox ICA    !38.1   !8.87   !60      !0.77   !            !               ! +------------+-------+-------+--------+-------+------------+---------------+ ! Mid ICA     !51.5   !18.3   ! 60      !0.64   !            !               ! +------------+-------+-------+--------+-------+------------+---------------+ ! Dist ICA    ! 40.7   !17.3   ! 60      !0.57   !            !               ! +------------+-------+-------+--------+-------+------------+---------------+ ! Prox ECA    !67.6   !10.9   !60      !0.84   !            !               ! +------------+-------+-------+--------+-------+------------+---------------+ ! Vertebral   !31.9   !8.87   !60      !0.72   !            !               ! +------------+-------+-------+--------+-------+------------+---------------+   - There is antegrade vertebral flow noted on the right side. - Additional Measurements:ICAPSV/CCAPSV 0.71. ICAEDV/CCAEDV 1.23. Carotid Left Measurements +------------+-------+-------+--------+-------+------------+---------------+ ! Location    ! PSV    ! EDV    ! Angle   ! RI     !%Stenosis   ! Tortuosity     ! +------------+-------+-------+--------+-------+------------+---------------+ ! Prox CCA    !63.1   !8.76   !60      !0.86   !            !               ! +------------+-------+-------+--------+-------+------------+---------------+ ! Mid CCA     !59.9   !8.41   ! 60      !0.86   !            !               ! +------------+-------+-------+--------+-------+------------+---------------+ ! Dist CCA    !45.9   !9.81   !60      !0.79   !            !               ! +------------+-------+-------+--------+-------+------------+---------------+ ! Bulb        !43.4   !9.11   ! 60      !0.79   !            !               ! +------------+-------+-------+--------+-------+------------+---------------+ ! Prox ICA    ! 34.7   !9.07   !60      !0.74   !            !               ! +------------+-------+-------+--------+-------+------------+---------------+ ! Mid ICA     !39     !13.6   ! 60      !0.65   !            !               ! +------------+-------+-------+--------+-------+------------+---------------+ ! Dist ICA    !41     !9.05   !60      !0.78   !            !               ! +------------+-------+-------+--------+-------+------------+---------------+ ! Prox ECA    !71.3   !9.8    ! 60      !0.86   !            !               ! +------------+-------+-------+--------+-------+------------+---------------+ ! Vertebral   !39.9   !4.74   !60      !0.88   !            !               ! +------------+-------+-------+--------+-------+------------+---------------+   - There is antegrade vertebral flow noted on the left side. - Additional Measurements:ICAPSV/CCAPSV 0.65. ICAEDV/CCAEDV 1.55. Ir Place Ng Tube By Dr Heriberto Gallardo    Result Date: 3/28/2019  PROCEDURE: XR PLACE NASOGASTRIC TUBE PHYS 3/28/2019 HISTORY: ORDERING SYSTEM PROVIDED HISTORY: patient has hiatal hernia TECHNOLOGIST PROVIDED HISTORY: patient has hiatal hernia TECHNIQUE: With the patient supine, an 18 Kisw[place]ili sump tube NG was inserted via the left nares, and manipulated into the intrathoracic stomach, verified with a small contrast injection. CONTRAST: Conray 43-10 mL used. SEDATION: None FLUOROSCOPY DOSE AND TYPE OR TIME AND EXPOSURES: Fluoroscopy time-2.3 minutes D AP-183 FINDINGS: Images show various stages of NG tube placement; following contrast injection, the tip and side hole are located within the stomach with contrast demonstrating the intrathoracic position. Successful fluoroscopy guided NG tube placement. NG tube is ready for use at this time. Ct Chest Abdomen Pelvis W Contrast    Result Date: 3/28/2019  EXAMINATION: CT OF THE CHEST, ABDOMEN, AND PELVIS WITH CONTRAST 3/28/2019 11:41 am TECHNIQUE: CT of the chest, abdomen and pelvis was performed with the administration of intravenous contrast. Multiplanar reformatted images are provided for review. Dose modulation, iterative reconstruction, and/or weight based adjustment of the mA/kV was utilized to reduce the radiation dose to as low as reasonably achievable. COMPARISON: CT abdomen and pelvis 12/19/2017 HISTORY: ORDERING SYSTEM PROVIDED HISTORY: abdominal distension, hiatal hernia, possible pneumonia TECHNOLOGIST PROVIDED HISTORY: Ordering Physician Provided Reason for Exam: abd pain Acuity: Unknown Type of Exam: Unknown FINDINGS: Chest: Mediastinum: The heart and great vessels appear unchanged. Left subclavian pacing device present.   No enlarged or suspicious-appearing lymph nodes. No pericardial effusion. Large left diaphragmatic hernia is noted which extends into the right hemithorax. Lungs/pleura: Scattered airspace disease in the posterior right upper lobe with interstitial and ground-glass nodules are noted. Chronic but more prominent opacities in the visualized left lower lobe are noted as well. This may in part represent compressive atelectasis. Scattered interstitial and ground-glass opacities are also more prominent in the right lung base with findings of compressive atelectasis. Debris is present in the trachea and proximal right mainstem bronchus. No effusion. Soft Tissues/Bones: No acute osseous abnormality identified. Abdomen/Pelvis: Organs: Cholelithiasis without CT evidence for acute cholecystitis. The liver, pancreas, spleen, adrenals and kidneys reveal no acute findings. Probable scarring in the left renal kidney appears unchanged. Right renal cyst. GI/Bowel: Large left diaphragmatic hernia contains the stomach with organoaxial rotation. Loops of colon and small bowel are also present. The pancreas is also herniated. No inflammatory change or evidence for bowel obstruction. Patulous segment of the sigmoid colon with mild stool burden in the distal colon is a chronic appearance. Pelvis: No acute findings. Excretion of contrast in the bladder is noted. Peritoneum/Retroperitoneum: No free air or free fluid. No lymphadenopathy. The aorta is normal in caliber. Calcified atheromatous plaque is present. Bones/Soft Tissues: Osteopenia. Advanced facet arthropathy in the lower lumbar spine with grade 1 anterolisthesis of L4. Mild disc disease at L1-2. Thoracolumbar levoscoliosis is noted. Moderate degenerative change in the hips. Scattered interstitial and ground-glass opacities are more prominent in the lungs bilaterally. Debris in the trachea and right mainstem bronchus is also noted.   Constellation of findings are suspicious for aspiration pneumonitis. Large left diaphragmatic hernia, as seen previously. Organo-axial rotation of the stomach is noted. Chronic findings, as above. Cta Head W Contrast    Result Date: 3/28/2019  Ordering Provider: Charlene Ortiz [facility]  Name:   aN Mcallister         : 1950  Unit #:   [id number]         Age:  81  Account #:     [de-identified]      Attending Physician:     Pt Location:   ER   Date of Service:   19    CTA HEAD W/ CONTRAST; CTA NECK W/ CONTRAST    HISTORY:  Stroke-like symptoms       Study:  CTA of the head and neck with contrast dated 2019.  2236 hours       TECHNIQUE:   Axial images were obtained through the brain after the administration of 100   ml Visipaque 320. Coronal, sagittal, and 3-D reconstruction imaging was   performed. Individualized dose optimization techniques were utilized. After   this, axial images were obtained from the base of the skull down to the   aortic arch. 100 ml Visipaque 320 was injected. Coronal, sagittal, and 3-D   reconstruction imaging was performed. Individualized dose optimization   techniques were utilized. Findings: The common carotid arteries are normal in caliber and free of   plaque. The carotid bulbs and internal carotid arteries are normal in caliber without   evidence of significant plaque or stenosis. Within the brain, cavernous carotids, supraclinoid internal carotids, middle   cerebral and anterior cerebral branches are normal in caliber without   evidence of stricture, aneurysm or vascular malformation. There is decreased   vascular flow in the location of the large middle cerebral territory infarcts   which are old. Basilar, posterior cerebral and superior cerebellar branches are normal in   caliber without evidence of aneurysm or vascular malformation. The aorta and pulmonary arteries are normal in caliber. Eventration of the   left hemidiaphragm is present.   The stomach is largely intrathoracic. Associated basilar atelectasis is present on the left. IMPRESSION:   Unremarkable CTA of the head. Unremarkable CTA of the neck. EXAM/ORDER VERIFICATION: Y  PT/FAMILY VERBALIZES UNDERSTANDING OF EXAM Y  PT SHIELDED N  Is pt pregnant? LMP   TECH INITIALS MV               COMMENTS       Electronically Signed by:  Matt Navarro M.D.  03/28/19 1133  ________________________________________  Matt Navarro M.D. Date Dict:  03/28/19 Miranda Good M.D. Date Trans: 03/28/19 0828 [facility]    [id number]    cc: Darrel Hinojosa R D. ORacheal          Labs and Images reviewed with:  [] Dr. Trena Wu. Joe     [] Dr. Linda Poe  [x] Dr. Manjinder Ward  [] there are no new interval images to review. ASSESSMENT AND PLAN:       This is R 89 y.o. female transfer from [facility] ED after presenting with aphasia, right sided deficits.  Last known well 1200 3/27/19.  PMH of previous CVA with residual dysarthria.  CTH, CTA head/neck negative. Stroke alert called on arrival.         NEUROLOGIC: Left hemiplegia 2/2 acute CVA  - CT head, CTA head, neck obtained and reviewed.  MRI unable to obtain due to AICD/pacer  -CT head  3/29: mod sized are of hypoatteneution medial right frontal parietal suggestive of acute infarct  - Goal SBP 110-180,MAP above 70  - Neuro checks per protocol     CARDIOVASCULAR:  - Goal SBP 110-180  - Dobutamine weaned off  - On Midodrine 10mg TID on 4/2  -ASA, statin  - EKG shows new onset lateral T-wave changes cardiology on board will lauryn ischemia workup once stable as per cardiology  - On Xarelto 20 mg, stress test/MECHE as per caridology  - Continue telemetry  - cardiology on board     PULMONARY:  - extubated 4/2 doing well on NC   - CXR 4/2- decrease in pleural effusion 4/3 showed worsening opacities R>L , lasix 20 mg x1 given, on Zithromax currently  cxr 4/4 imporved        RENAL/FLUID/ELECTROLYTE:  -Normal renal function  - Monitor urine output  - IVF d/c  - Replace electrolytes PRN, potassium 3.1 on 4/4 repleted  As per cardiology need to above 4  - Daily BMP     GI/NUTRITION:  NUTRITION:  Tube feeding at goal  - GI prophylaxis: pepcid   - failed video swal need discussion with family about peg placement     ID:  - Tmax 99, WBC 12.3  - Cultures NGTD  -will zithromax for now   - Continue to monitor for fevers  - Daily CBC     HEME:   - H/H: 9.6 dropped from 10.4 on 4/1/30.0 but improved to 11.8 on 4/3 11.7/36 on 4/4  - Daily CBC     ENDOCRINE:  - Continue to monitor blood glucose, goal <180     OTHER:  - PT/OT/ST   - Code Status: Full     PROPHYLAXIS:  Stress ulcer: H2     DVT PROPHYLAXIS:  - SCD  -Heparin            DISPOSITION:  [] To remain ICU:   [x] OK for out of ICU from Neuro Critical Care standpoint    For any changes in exam or patient status please contact Neuro Critical Care.       Nehal Evangelista MD  Neuro Critical Care  Pager [phone]  4/4/2019     6:39 AM

## 2019-04-04 NOTE — CARE COORDINATION
Writer left voicemail message for Thompson with Admissions at [facility]. Pt currently resides in their Assisted Living community. PM&R evaluation completed and pt not appropriate for inpatient rehab at this time.

## 2019-04-04 NOTE — PLAN OF CARE
Problem: Restraint Use - Nonviolent/Non-Self-Destructive Behavior:  Goal: Absence of restraint-related injury  Description  Absence of restraint-related injury  Outcome: Ongoing  Note:   Restraints in place. ROM performed. Restraints still needed, patient pulls at tubes and lines. Continue to monitor.

## 2019-04-04 NOTE — PROGRESS NOTES
Critical care team - Resident sign-out to inter med service      Date and time: 4/4/2019 10:33 AM  Patient's name: Ravin Carrasquillo Record Number: [id number]  Patient's account/billing number: [de-identified]  Patient's YOB: 1950  Age: 81 y.o. Date of Admission: 3/28/2019  1:05 AM      Primary Care Physician: Poppy Pederson DO    Code Status: Full Code    Mode of physician to physician communication:        [x] Via telephone   [x] In person     Date and time of sign-out: 4/4/2019 10:33 AM        Accepting team's attending: Dr. Hal Ornelas    Patient's current ICU Bed:  705    Patient's assigned bed on floor: To be determined        [] Med-Surg Monitored [x] Step-down       [] Psychiatry ICU       [] Psych floor     Reason for ICU admission:     AMS    ICU course summary:     Hospital Course:  Initial Presentation (Admitted 3/28): The patient is W 58 y.o. female presented with presents with concern for CVA.  Transfer from [facility] ED, presented with NIH 21.  Last known well 1200 3/27/18.  Previous records indicate pt found by family w/ AMS, aphasia, Left sided deficits.  PMH of previous CVA, afib as on outpatient Xarelto but pt discontinued a few months.  Previous dysarthria, no reported residual weakness.   [facility] Course:   3/29: Intubated overnight for impending respiratory Failure. Copious secreations, remains on zosyn, zithromax.   3/30: Started on norepinephrine drip. Troponins are trending down.   3/31: remained on pressors, switch to dobutamine from levophed   4/1:   No acute events overnight.  Levophed switched to dobutamine yesterday, titrated up to 5 mcgs overnight for goal systolic blood pressure greater than 100.  Patient remains intubated, sedated.  Patient seen and evaluated at bedside this morning, following simple commands right upper and lower extremity.  Overall patient's condition slowly improving.     4/2:  No acute overnight events, more responsive and follows commnads, MAP above 70 as per a-line measurements, heart rhythm showed a.fib on dobtumaine running at 6 jan, afebrile, hbg 10.4 on 4/1 today 9.6, mg 1.8 and potassium 3.7 , replacement ordered to keep mg above 2 and potassium above 4. Cardiology on board for pvc,had BW today. Patient extubated  today doing well on Nc, will c/w zoysn for another day Zithromax d/c and midodrine increased to 10 mg and to wean off dobutimine              4/3:  Patient desaturate to 91 % was placed on a non breather and later on bipap and saturation improved. however cxr showed worsening opacities R>L,  therefore chest Pt started zithromax resumed and lasix 20 mg x1 given, off dobutamine and doing well neurologically. On tube feeds , swallow eval ordered didn't pass video ordered           Last 24 hours:  No acute events overnight. Placed on bipap, potassium 3.1 in am labs replete, wbc trending down, cxr showed improvement from prior day. Scheduled for stress test as per cardiology today, patient failed video swallow eval lauryn to consider alternative means of feeding            Current Vitals:     /68   Pulse 71   Temp 98.1 °F (36.7 °C) (Axillary)   Resp 23   Ht 5' 4\" (1.626 m)   Wt 108 lb 7.5 oz (49.2 kg)   SpO2 100%   BMI 18.62 kg/m²       Cultures:       Blood cultures:      [] None drawn      [] Negative             []  Positive (Details:  )  Urine Culture:        [] None drawn      [] Negative             []  Positive (Details:  )  Sputum Culture:   [] None drawn       [] Negative             []  Positive (Details:  )       Consults:     1.  Cardiology    Assessment:     Patient Active Problem List    Diagnosis Date Noted    Moderate malnutrition ([facility] 04/03/2019    Acute ischemic stroke (HCC)     Aspiration pneumonia of both upper lobes ([facility]     Stroke-like symptoms 03/28/2019    Acute encephalopathy     History of cerebral infarction     History of implantable cardioverter-defibrillator (ICD) placement     Hiatal hernia     Chronic atrial fibrillation ([facility]     Expressive aphasia 04/18/2018    Functional urinary incontinence 03/15/2017    Superior mesenteric vein thrombosis     Cerebral infarction ([facility] 08/25/2014    Automatic implantable cardioverter-defibrillator in situ 07/24/2014    Elevated fasting glucose     Nonischemic cardiomyopathy (HCC)     Hyperlipidemia     Anxiety     History of tobacco abuse     Fibrocystic breast disease     Mild mitral regurgitation     Sick sinus syndrome ([facility]          Recommended Follow-up:     1. F/u cardiology in regards to stress test/ Chuy  2. F/u with family in regards to peg placement discussion  3. Monitor for fever and the need to switch abx if needed        Above mentioned assessment and plan was discussed by me with the admitting medicine resident. The medicine team assigned to the patient by medicine admitting resident will be following up the patient from now onwards on the floor.      Dany Edmonds M.D.  PGY - 2  Department of Neurology  [facility] ([place]             4/4/2019, 10:33 AM

## 2019-04-04 NOTE — PROGRESS NOTES
Occupational Therapy   Occupational Therapy Initial Assessment  Date: 2019   Patient Name: Baron Simons  MRN: [id number]     : 1950    Date of Service: 2019    Discharge Recommendations: Further therapy recommended at discharge. Assessment   Performance deficits / Impairments: Decreased functional mobility ; Decreased high-level IADLs;Decreased ADL status; Decreased endurance;Decreased balance;Decreased safe awareness;Decreased strength;Decreased cognition  Prognosis: Fair  Decision Making: Medium Complexity  Patient Education: Safety awareness, OTPOC-fair return  REQUIRES OT FOLLOW UP: Yes  Activity Tolerance  Activity Tolerance: Treatment limited secondary to decreased cognition;Patient limited by fatigue  Activity Tolerance: Non-verbal at baseline  Safety Devices  Safety Devices in place: Yes  Type of devices: All fall risk precautions in place; Left in bed;Nurse notified  Restraints  Initially in place: Yes  Restraints: R neuro mitt         Patient Diagnosis(es): The encounter diagnosis was Stroke-like symptoms. 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   has a past surgical history that includes laparoscopy (1982); Hysterectomy, vaginal (1993); Cardiac defibrillator placement (2008); Cardiac catheterization (01/10/2008); Gastrostomy tube placement (2014); ileostomy or jejunostomy (2013); and Stomach surgery (). Restrictions  Restrictions/Precautions  Restrictions/Precautions: Fall Risk, General Precautions  Required Braces or Orthoses?: No  Position Activity Restriction  Other position/activity restrictions: L side deficits, non-verbal at baseline, -180, up with A    Subjective   General  Patient assessed for rehabilitation services?: Yes  Family / Caregiver Present: No  Diagnosis: R MCA CVA  Pain Assessment  Pain Assessment: CPOT  Pain Level: 0  Reyna-Baker Pain Rating: No hurt  RASS Score (Ventilated): Drowsy - Patient awakens with sustained eye opening and eye contact  Oxygen Therapy  SpO2: 100 %  Social/Functional History  Social/Functional History  Lives With: Other (comment)  Type of Home: Assisted living  Home Layout: One level  ADL Assistance: Independent  Homemaking Assistance: Independent  Homemaking Responsibilities: No  Ambulation Assistance: Independent  Transfer Assistance: Independent  Additional Comments: Per RN, pt has been non-verbal since 2006, resides in assisted living and ambulates independently. Objective   Vision: Impaired(R gaze preference)  Hearing: Within functional limits    Orientation  Overall Orientation Status: Impaired  Orientation Level: Disoriented to time;Disoriented to situation;Disoriented to place;Oriented to person     Balance  Sitting Balance: Dependent/Total(Pt sat for 13 min)  Standing Balance: Unable to assess(comment)  Standing Balance  Sit to stand: Unable to assess  Stand to sit: Unable to assess  ADL  Feeding: Moderate assistance; Increased time to complete;Setup;Verbal cueing  Grooming: Moderate assistance;Setup;Verbal cueing; Increased time to complete  UE Bathing: Maximum assistance;Setup;Verbal cueing; Increased time to complete  LE Bathing: Dependent/Total  UE Dressing: Dependent/Total  LE Dressing: Dependent/Total  Toileting: Dependent/Total  Tone RUE  RUE Tone: Normotonic  Tone LUE  LUE Tone: Hypotonic  LUE Modified Amanda Scale  LUE Modified Amanda Scale Completed: No  Coordination  Movements Are Fluid And Coordinated: No  Coordination and Movement description: Gross motor impairments; Fine motor impairments; Left UE  Quality of Movement Other  Comment: Writer had pt WB through LUE x4 for 10 seconds each to promote functionality     Bed mobility  Supine to Sit: Dependent/Total  Sit to Supine: Dependent/Total  Scooting: Dependent/Total  Transfers  Sit to stand: Unable to assess  Stand to sit: Unable to assess     Cognition  Overall Cognitive Status: Exceptions  Following Commands: Follows one step commands with increased time; Follows one step commands with repetition  Attention Span: Difficulty dividing attention  Safety Judgement: Decreased awareness of need for assistance  Insights: Decreased awareness of deficits  Cognition Comment: Pt consistently follows 1-step commands, pt does nod yes/no inappropriately at times however, pt did laugh appropriately at writer's jokes this date, non-verbal at times     Sensation  Overall Sensation Status: WFL      LUE AROM (degrees)  LUE AROM : Exceptions  LUE General AROM: No active movement noted this date  RUE AROM (degrees)  RUE AROM : Exceptions  R Shoulder Flexion 0-180: Limited to 60 degrees this date  R Elbow Flexion 0-145: WFL's, able to touch nose upon command  LUE Strength  Gross LUE Strength: Exceptions to [facility]  L Shoulder Flex: 0/5  L Elbow Flex: 0/5  L Elbow Ext: 0/5  L Hand Grasp: 0/5  L Hand Release: 0/5  RUE Strength  Gross RUE Strength: Exceptions to [facility]  R Shoulder Flex: 3-/5  R Elbow Flex: 5/5  R Elbow Ext: 5/5  R Hand Grasp: 5/5  R Hand Release: 5/5   Plan   Plan  Times per week: 3-4x  Current Treatment Recommendations: Balance Training, Functional Mobility Training, Endurance Training, Patient/Caregiver Education & Training, Self-Care / ADL, Equipment Evaluation, Education, & procurement, Safety Education & Training, Pain Management, Neuromuscular Re-education        AM-PAC Inpatient Daily Activity Raw

## 2019-04-04 NOTE — PLAN OF CARE
Problem: Falls - Risk of:  Goal: Absence of physical injury  Description  Absence of physical injury  Outcome: Ongoing  Note:   Fall risk precautions in place. Bed in lowest position with wheels locked, bed alarm in place and activated,  non-skid socks on pt, fall risk id on pt, call light in reach, pt encouraged to call before getting out of bed and for any other needs or c/o.

## 2019-04-04 NOTE — DISCHARGE INSTR - COC
Continuity of Care Form    Patient Name: Gonzales Romano   :  1950  MRN:  [id number]    [facility] date:  3/28/2019  Discharge date:  ***    Code Status Order: Full Code   Advance Directives:     Admitting Physician:  Linda Starks MD  PCP: Dayton Villela DO    Discharging Nurse: [facility] Unit/Room#: 9524/5274-62  Discharging Unit Phone Number: ***    Emergency Contact:   Extended Emergency Contact Information  Primary Emergency Contact: Adrian Arguello  Address: [address]  [address] Phone: [phone]  Relation: Brother/Sister  Secondary Emergency Contact: [address], [address] [facility] Phone: [phone]  Relation: Brother/Sister    Past Surgical History:  Past Surgical History:   Procedure Laterality Date    CARDIAC CATHETERIZATION  01/10/2008    significant cardiomyopathy, ejection fraction 30%, global hypokinesis, sinus bradycardia into the 40s transiently, frequent PVCs, otherwise normal.     CARDIAC DEFIBRILLATOR PLACEMENT  2008    ICD in situ.  GASTROSTOMY TUBE PLACEMENT  2014    attempted peg tube, unsuccessful    HYSTERECTOMY, VAGINAL  1993    total with BSO for benign tumors.  ILEOSTOMY OR JEJUNOSTOMY  2013    jejunostomy    LAPAROSCOPY  1982    with D&C.      STOMACH SURGERY      gastropexy with G-tube holding in place       Immunization History:   Immunization History   Administered Date(s) Administered    Influenza Virus Vaccine 10/24/2008, 2009, 2012, 2013, 10/15/2014, 2015    Influenza, High Dose (Fluzone 65 yrs and older) 10/18/2018    Influenza, Yoko Fields, 3 yrs and older, IM, PF (Fluzone 3 yrs and older or Afluria 5 yrs and older) 2016    Pneumococcal 13-valent Conjugate ([id number]) 2017    Pneumococcal Polysaccharide ([id number]) 2014       Active Problems:  Patient Active Problem List   Diagnosis Code    Elevated fasting glucose R73.01    Nonischemic cardiomyopathy (HCC) I42.8    Hyperlipidemia E78.5    Anxiety F41.9    History of tobacco abuse Z87.891    Fibrocystic breast disease N60.19    Mild mitral regurgitation I34.0    Sick sinus syndrome (HCC) I49.5    Automatic implantable cardioverter-defibrillator in situ Z95.810    Cerebral infarction (HCC) I63.9    Superior mesenteric vein thrombosis I81    Functional urinary incontinence R39.81    Expressive aphasia R47.01    Stroke-like symptoms R29.90    Acute encephalopathy G93.40    History of cerebral infarction Z86.73    History of implantable cardioverter-defibrillator (ICD) placement Z95.810    Hiatal hernia K44.9    Chronic atrial fibrillation (HCC) I48.2    Acute ischemic stroke (HCC) I63.9    Aspiration pneumonia of both upper lobes (HCC) J69.0    Moderate malnutrition (HCC) E44.0       Isolation/Infection:   Isolation          No Isolation            Nurse Assessment:  Last Vital Signs: /68   Pulse 72   Temp 99.2 °F (37.3 °C) (Axillary)   Resp 25   Ht 5' 4\" (1.626 m)   Wt 108 lb 7.5 oz (49.2 kg)   SpO2 100%   BMI 18.62 kg/m²     Last documented pain score (0-10 scale): Pain Level: 0  Last Weight:   Wt Readings from Last 1 Encounters:   03/29/19 108 lb 7.5 oz (49.2 kg)     Mental Status:  alert    IV Access:  - None    Nursing Mobility/ADLs:  Walking   Dependent  Transfer  Dependent  Bathing  Dependent  Dressing  Dependent  Toileting  Dependent  Feeding  Dependent  Med Admin  Dependent  Med Delivery   crushed    Wound Care Documentation and Therapy:  Wound 03/28/19 Coccyx (Active)   Wound Image   3/28/2019  1:22 PM   Wound Pressure Stage  2 4/1/2019  8:01 PM   Dressing Status Clean;Dry; Intact 4/4/2019  8:00 AM   Dressing/Treatment Open to air;Moisture barrier 4/4/2019  8:00 AM   Wound Length (cm) 0.5 cm 3/28/2019  1:22 PM   Wound Width (cm) 0.3 cm 3/28/2019  1:22 PM   Wound Surface Area (cm^2) 0.15 cm^2 3/28/2019  1:22 PM   Wound Assessment Dry 4/4/2019  8:00 AM   Drainage Amount None 4/4/2019  8:00 AM   Odor None 4/4/2019  8:00 AM   Fior-wound Assessment Blanchable erythema 4/4/2019  8:00 AM   Number of days: 6        Elimination:  Continence:   · Bowel: No  · Bladder: No  Urinary Catheter: None   Colostomy/Ileostomy/Ileal Conduit: No       Date of Last BM: 4/11/19    Intake/Output Summary (Last 24 hours) at 4/4/2019 1200  Last data filed at 4/4/2019 0606  Gross per 24 hour   Intake 987 ml   Output --   Net 987 ml     I/O last 3 completed shifts: In: 80 [I. V.:517; NG/GT:470]  Out: -     Safety Concerns: At Risk for Falls and Aspiration Risk    Impairments/Disabilities:      Speech and Vision    Nutrition Therapy:  Current Nutrition Therapy:   - Tube Feedings:  Standard with fiber    Routes of Feeding: Jejunal Tube  Liquids: No Liquids  Daily Fluid Restriction: no  Last Modified Barium Swallow with Video (Video Swallowing Test): 4/8/2019    Treatments at the Time of Hospital Discharge:   Respiratory Treatments: ***  Oxygen Therapy:  is on oxygen at 2 L/min per nasal cannula.   Ventilator:    - No ventilator support    Rehab Therapies: Physical Therapy, Occupational Therapy and Speech/Language Therapy  Weight Bearing Status/Restrictions: No weight bearing restirctions  Other Medical Equipment (for information only, NOT a DME order):  hospital bed  Other Treatments: ***    Patient's personal belongings (please select all that are sent with patient):  Glasses    RN SIGNATURE:  Electronically signed by Soila Seals RN on 4/12/19 at 11:44 AM    CASE MANAGEMENT/SOCIAL WORK SECTION    Inpatient Status Date: ***    Readmission Risk Assessment Score:  Readmission Risk              Risk of Unplanned Readmission:        19           Discharging to Facility/ Agency   · Name: [facility]  · Address:  [facility] [address], [address]  · Phone:  [phone]  · Fax:  [phone]    Dialysis Facility (if applicable)   · Name:  · Address:  · Dialysis Schedule:  · Phone:  · Fax:    / signature: Electronically signed by Lizbeth Moritz, RN on 4/10/19 at 11:11 AM    PHYSICIAN SECTION    Prognosis: Fair    Condition at Discharge: Stable    Rehab Potential (if transferring to Rehab): Fair    Recommended Labs or Other Treatments After Discharge: none    Physician Certification: I certify the above information and transfer of  Serum  is necessary for the continuing treatment of the diagnosis listed and that she requires [place] for greater 30 days.      Update Admission H&P: No change in H&P    PHYSICIAN SIGNATURE:  Electronically signed by Katty Shah MD on 4/12/19 at 9:18 AM

## 2019-04-04 NOTE — PROGRESS NOTES
mobility  Rolling to Left: Maximum assistance(able to assist with RUE)  Supine to Sit: Maximum assistance;2 Person assistance(able to assist with R UE and LE)  Sit to Supine: Dependent/Total  Scooting: Dependent/Total  Transfers  Sit to Stand: Unable to assess  Ambulation  Ambulation?: No     Balance  Posture: Poor  Sitting - Static: Poor;+  Sitting - Dynamic: Poor  Comments: Pt sat EOB ~6 minutes with Mod A. Able to complete dynamic reaching x6 and able to complete kicking leg out and abduction. Pt demo poor head control would lift head up but return to full flexion shortly after. Exercises  Shoulder Active Range of Motion: RUE Active assistive shoulder flexion, elbow flexion/extension  Core Strengthening: sitting EOB ~6min with dynamic RUE and LE movement- mod A for ~2 min and max for ~4min for trunk support  Comments: PROM RUE/RLE x10 rep all planes of motion. calf stretch x30 s. AAROM LLE hip flexion, knee flexion, ankle pumps. Increased need for assistance from therapist for hip flexion                        Assessment   Body structures, Functions, Activity limitations: Decreased functional mobility ; Decreased endurance;Decreased ROM; Decreased strength;Decreased safe awareness;Decreased balance;Decreased ADL status  Assessment: Pt was more alert today and was responding to more commands. Pt sat EOB ~6 min with mod A for 2min and progressed to 4min as pt fatigued. Pt able to participate in some sitting dynamic activity but began to decrease in following commands as patient began to fatigue. Prognosis: Good  Patient Education: Importance of mobility  Barriers to Learning: cognition  REQUIRES PT FOLLOW UP: Yes  Activity Tolerance  Activity Tolerance: Patient limited by endurance; Patient limited by fatigue       Goals  Short term goals  Time Frame for Short term goals: 14 visits  Short term goal 1: Perform bed mobility with Mod A  Short term goal 2: Perform sit to stand transfer with Max A  Short term goal 3: Demo Poor+ dynamic standing balance with appropriate AD  Short term goal 4: Ambulate 30ft with green lift walker and Mod A  Short term goal 5: Participate in 30 minutes of therapy to demo increased endurance    Plan    Plan  Times per week: 5-6x/wk  Current Treatment Recommendations: Strengthening, Balance Training, Functional Mobility Training, Transfer Training, Endurance Training, Patient/Caregiver Education & Training, Safety Education & Training, Home Exercise Program, ROM, Neuromuscular Re-education, Gait Training  Plan Comment: attempt rich fernandez if appropriate  Safety Devices  Type of devices: Call light within reach, Nurse notified, Left in bed  Restraints  Initially in place: (neuro deshawn R hand)     Therapy Time   Individual Concurrent Group Co-treatment   Time In 1406         Time Out 1440         Minutes 34         Timed Code Treatment Minutes: 184 [facility]  This treatment/evaluation completed by signing SPT. Signing PT agrees with treatment and documentation.

## 2019-04-04 NOTE — PROGRESS NOTES
[facility]   Progress Note                   Date:   4/4/2019  Patient name: Baron Simons  Date of admission:  3/28/2019  1:05 AM  MRN:   [id number]  YOB: 1950  PCP: Hubert Reyes DO    Reason for Admission: Stroke-like symptom [R29.90]    Subjective:       Clinical Changes / Abnormalities: Pt seen and examined at bedside. Doing well post-extubation. Apparently this AM was very combative and not following commands per stress lab. Stress test not completed d/t this. Currently patient calm, cooperative, and following commands. Tele reviewed - Afib with demand pacing. Remains off Dobutamine and BP stable. K+ being replaced. Medications:   Scheduled Meds:   azithromycin  500 mg Intravenous Q24H    midodrine  10 mg Oral TID WC    rivaroxaban  20 mg Oral Daily    senna  1 tablet Oral Nightly    aspirin  81 mg Oral Daily    atorvastatin  40 mg Oral Nightly    sodium chloride flush  10 mL Intravenous 2 times per day    lidocaine   Topical Once     Continuous Infusions:   DOBUTamine Stopped (04/02/19 1830)     CBC:   Recent Labs     04/02/19  0330 04/03/19 0519 04/04/19  0426   WBC 6.6 12.4* 12.3*   HGB 9.6* 11.8* 11.7*   PLT See Reflexed IPF Result 229 178     BMP:    Recent Labs     04/02/19  0330 04/03/19  0519 04/04/19  0426    143 143   K 3.7 3.7 3.1*   * 109* 106   CO2 22 20 28   BUN 19 31* 27*   CREATININE 0.72 0.86 0.78   GLUCOSE 142* 137* 108*     Hepatic:   Recent Labs     04/03/19  0519   AST 51*   ALT 35*   BILITOT 0.52   ALKPHOS 60     Troponin:   No results for input(s): TROPHS in the last 72 hours. BNP: No results for input(s): BNP in the last 72 hours. Lipids:   No results for input(s): CHOL, HDL in the last 72 hours. Invalid input(s): LDLCALCU  INR:   No results for input(s): INR in the last 72 hours. Diagnostics:    Cardiac Testing      ICD generator change 11/25/13: [employer] AK     CATH 1/10/08: NICM.  EF 30%     Dr. Matthias Rodriguez -- Cardiomyopathy with no signs of volume overload. 3. S/P ICD. 4. Multiple CVAs with non-compliance with anticoagulation. 5. HTN. 6. Hyperlipidemia. Plan of Treatment:   1. PAF. xarelto Restarted. 2. Trops elevated but trending down. New onset lateral T wave changes on EKG. Continue ASA/Statin. I discussed with patient's sister yesterday and niece the day before. RN to let family know to be present in AM for consent. Hold TF after midnight. Will try stress test again in AM  3. Goal SBP per neuro 110-180. Off Dobutamine off since yesterday. 4. Keep K > 4   5.  Follow    Electronically signed by ANASTACIO Ayala CNP on 4/4/2019 at [facility].  [phone]

## 2019-04-05 NOTE — PROGRESS NOTES
pro/day  · Anthropometric Measures:  · Ht: 5' 4\" (162.6 cm)   · Current Body Wt: 108 lb 7.5 oz (49.2 kg)  · Admission Body Wt: 108 lb (49 kg)  · Weight Change: 10.7% x 6 months   · Ideal Body Wt: 120 lb (54.4 kg), % Ideal Body 90%  · BMI Classification: BMI 18.5 - 24.9 Normal Weight    Nutrition Interventions:   Continue current Tube Feeding  Continued Inpatient Monitoring, Education Not Indicated    Nutrition Evaluation:   · Evaluation: No progress toward goals   · Goals: meet more than 75% of nutrition needs   · Monitoring: TF Intake, TF Tolerance      Electronically signed by Mary Jo Dougherty RD, LD on 4/5/19 at 3:38 PM    Contact Number: [phone]

## 2019-04-05 NOTE — H&P
[facility]    HISTORY AND PHYSICAL EXAMINATION            Date:   4/6/2019  Patient name: Saba Brooks  Date of admission:  3/28/2019  1:05 AM  MRN:   [id number]  Account:  [de-identified]  YOB: 1950  PCP:    Mitzie Gaucher, DO  Room:   9446/3949-79  Code Status:    Full Code    Chief Complaint:     Chief Complaint   Patient presents with    Cerebrovascular Accident       History Obtained From:     non-family caregiver - NICU resident- EMR    History of Present Illness: The patient is a 81 y.o. Non-/non  female who presents with Cerebrovascular Accident   and she is admitted to the hospital for the management of  CVA  Per chart  \" Initial Presentation (Admitted 3/28): The patient is R 46 y.o. female presented with presents with concern for CVA.  Transfer from [facility] ED, presented with NIH 21.  Last known well 1200 3/27/18.  Previous records indicate pt found by family w/ AMS, aphasia, Left sided deficits.  PMH of previous CVA, afib as on outpatient Xarelto but pt discontinued a few months.  Previous dysarthria, no reported residual weakness.   [facility] Course:   3/29: Intubated overnight for impending respiratory Failure. Copious secreations, remains on zosyn, zithromax.   3/30: Started on norepinephrine drip. Troponins are trending down.   3/31: remained on pressors, switch to dobutamine from levophed   4/1:   No acute events overnight.  Levophed switched to dobutamine yesterday, titrated up to 5 mcgs overnight for goal systolic blood pressure greater than 100.  Patient remains intubated, sedated.  Patient seen and evaluated at bedside this morning, following simple commands right upper and lower extremity.  Overall patient's condition slowly improving.     4/2:  No acute overnight events, more responsive and follows commnads, MAP above 70 as per a-line measurements, heart rhythm showed a.fib on dobtumaine running at 6 jan, afebrile, hbg 10.4 on 4/1 today 9.6, mg 1.8 and potassium 3.7 , replacement ordered to keep mg above 2 and potassium above 4. Cardiology on board for pvc,had BW today. Patient extubated  today doing well on Nc, will c/w zoysn for another day Zithromax d/c and midodrine increased to 10 mg and to wean off dobutimine              4/3:  Patient desaturate to 91 % was placed on a non breather and later on bipap and saturation improved. however cxr showed worsening opacities R>L,  therefore chest Pt started zithromax resumed and lasix 20 mg x1 given, off dobutamine and doing well neurologically. On tube feeds , swallow eval ordered didn't pass video ordered           Last 24 hours:  No acute events overnight. Placed on bipap, potassium 3.1 in am labs replete, wbc trending down, cxr showed improvement from prior day but will continue Zithromax. Scheduled for stress test/MECHE as per cardiology , patient failed video swallow eval need to consider alternative means of feedings    Upon my  evaluation patient is nonverbal but quite and follow commands, Lt sided weakness,  vitals are stable, no family at bedside  Tube feeds currently on hold for a stress test scheduled today  Patient had a white medicine on her right hand but doesn't seem aggressive or agitated  Phosphorous is low    Past Medical History:     Past Medical History:   Diagnosis Date    Anxiety     Blurred vision, bilateral     mild.  Bradycardia     intolerant of beta blockers, intolerant of ACE inhibitors.  Chronic renal insufficiency     Constipation     Depression     Elevated fasting glucose     Encephalomalacia     parietal.     Fibrocystic breast disease     Frontal headache     bilateral, mild, transient.  Genital atrophy of female     Grief reaction     death of mother.  Hiatal hernia     extremely large.  History of tobacco abuse     now quit.      Hyperlipidemia     Ileus (HCC)     Mild mitral regurgitation     Nonischemic cardiomyopathy (HCC)     ejection fraction less than 30%, implantation of ICD.  Overweight     Paraesophageal hiatal hernia     [facility]     Stroke ([facility] 07/19/2006    acute, history of stroke 07/2001 with no significant neurological deficit, thought to be due to transient atrial fibrillation, right sided hemiparesis, aphasia.  Superior mesenteric vein thrombosis     Tinnitus of right ear     constant, since 1989. Past Surgical History:     Past Surgical History:   Procedure Laterality Date    CARDIAC CATHETERIZATION  01/10/2008    significant cardiomyopathy, ejection fraction 30%, global hypokinesis, sinus bradycardia into the 40s transiently, frequent PVCs, otherwise normal.     CARDIAC DEFIBRILLATOR PLACEMENT  11/25/2013    NOT MRI CONDITIONAL-[employer] ICD MODEL [id number] SERIAL [id number] LEAD RA MODEL 652793-10 SERIAL [id number] LEAD RV [id number] SERIAL [id number]    GASTROSTOMY TUBE PLACEMENT  8-    attempted peg tube, unsuccessful    HYSTERECTOMY, VAGINAL  09/22/1993    total with BSO for benign tumors.  ILEOSTOMY OR JEJUNOSTOMY  08/30/2013    jejunostomy    LAPAROSCOPY  05/14/1982    with D&C.  STOMACH SURGERY  2015    gastropexy with G-tube holding in place        Medications Prior to Admission:     Prior to Admission medications    Medication Sig Start Date End Date Taking?  Authorizing Provider   atorvastatin (LIPITOR) 40 MG tablet take 1 tablet by mouth once daily 2/12/19   Jeferson Hollis, DO   XARELTO 20 MG TABS tablet take 1 tablet by mouth once daily 10/12/18   Jeferson Hollis DO   donepezil (ARICEPT) 10 MG tablet Take 10 mg by mouth daily    Historical Provider, MD [address] [place] [address] Take by mouth daily    Historical Provider, MD   meclizine (ANTIVERT) 25 MG tablet Take 1 tablet by mouth 2 times daily as needed for Dizziness 7/2/17   Bryant Rinaldi MD   aspirin 81 MG tablet Take 1 rhythm, no murmur, gallop, rub.   Abdomen: Soft, nontender, nondistended, normal bowel sounds, no hepatomegaly or splenomegaly  Neurologic: muscle strength 1/5 on L side, 4/5 on the right side  normal muscle tone and bulk, aphasia, cant fully assess  Skin: No gross lesions, rashes, bruising or bleeding on exposed skin area  Extremities:  peripheral pulses palpable, no pedal edema or calf pain with palpation    Investigations:      Laboratory Testing:  Recent Results (from the past 24 hour(s))   Basic Metabolic Panel    Collection Time: 04/06/19  6:13 AM   Result Value Ref Range    Glucose 144 (H) 70 - 99 mg/dL    BUN 18 8 - 23 mg/dL    CREATININE 0.59 0.50 - 0.90 mg/dL    Bun/Cre Ratio NOT REPORTED 9 - 20    Calcium 8.3 (L) 8.6 - 10.4 mg/dL    Sodium 141 135 - 144 mmol/L    Potassium 3.6 (L) 3.7 - 5.3 mmol/L    Chloride 103 98 - 107 mmol/L    CO2 28 20 - 31 mmol/L    Anion Gap 10 9 - 17 mmol/L    GFR Non-African American >60 >60 mL/min    GFR African American >60 >60 mL/min    GFR Comment          GFR Staging NOT REPORTED    CBC WITH AUTO DIFFERENTIAL    Collection Time: 04/06/19  6:13 AM   Result Value Ref Range    WBC 10.3 3.5 - 11.3 k/uL    RBC 4.24 3.95 - 5.11 m/uL    Hemoglobin 12.9 11.9 - 15.1 g/dL    Hematocrit 40.9 36.3 - 47.1 %    MCV 96.5 82.6 - 102.9 fL    MCH 30.4 25.2 - 33.5 pg    MCHC 31.5 28.4 - 34.8 g/dL    RDW 13.6 11.8 - 14.4 %    Platelets 654 216 - 966 k/uL    MPV 10.8 8.1 - 13.5 fL    NRBC Automated 0.0 0.0 per 100 WBC    Differential Type NOT REPORTED     Seg Neutrophils 76 (H) 36 - 65 %    Lymphocytes 13 (L) 24 - 43 %    Monocytes 9 3 - 12 %    Eosinophils % 1 1 - 4 %    Basophils 0 0 - 2 %    Immature Granulocytes 1 (H) 0 %    Segs Absolute 7.76 1.50 - 8.10 k/uL    Absolute Lymph # 1.37 1.10 - 3.70 k/uL    Absolute Mono # 0.89 0.10 - 1.20 k/uL    Absolute Eos # 0.13 0.00 - 0.44 k/uL    Basophils # 0.03 0.00 - 0.20 k/uL    Absolute Immature Granulocyte 0.09 0.00 - 0.30 k/uL    WBC Morphology NOT REPORTED     RBC Morphology NOT REPORTED     Platelet Estimate NOT REPORTED    MAGNESIUM    Collection Time: 04/06/19  6:13 AM   Result Value Ref Range    Magnesium 2.0 1.6 - 2.6 mg/dL   PHOSPHORUS    Collection Time: 04/06/19  6:13 AM   Result Value Ref Range    Phosphorus 2.9 2.6 - 4.5 mg/dL       Imaging/Diagnostics:        Assessment :      Primary Problem  Acute ischemic stroke [facility])    Active Hospital Problems    Diagnosis Date Noted    Acute ischemic stroke [facility]) [I63.9]      Priority: High    Moderate malnutrition ([facility] [address] [E44.0] 04/03/2019    Aspiration pneumonia of both upper lobes ([facility] [address] [J69.0]     Stroke-like symptoms [R29.90] 03/28/2019    Acute encephalopathy [G93.40]     History of cerebral infarction [Z86.73]     History of implantable cardioverter-defibrillator (ICD) placement [Z95.810]     Hiatal hernia [K44.9]     Chronic atrial fibrillation ([facility] [facility] [I48.2]        Plan:     Xarelto resumed, stroke most likely embolic  Continue aspirin and statin   Stress test today   Tube feeds on hold now, resume later today    Replace phosphorus  DC Azithromycin and monitor off Abx  Titrate off O2  Diuresis, gentle given borderline BP  Plan to repeat swallow study earlier next week  DVT & GI PPx    Consultations:   IP CONSULT TO NEUROLOGY  IP CONSULT TO CARDIOLOGY  IP CONSULT TO UROLOGY  IP CONSULT TO GENERAL SURGERY  IP CONSULT TO DIETITIAN  IP CONSULT TO IV TEAM  IP CONSULT TO PHYSICAL MEDICINE REHAB  IP CONSULT TO HOSPITALIST     Patient is admitted as inpatient status because of co-morbidities listed above, severity of signs and symptoms as outlined, requirement for current medical therapies and most importantly because of direct risk to patient if care not provided in a hospital setting.     Haley Hanson MD  4/6/2019  3:57 PM    Copy sent to Dr. Carlos Manuel Holliday DO

## 2019-04-05 NOTE — PROGRESS NOTES
[address] [facility]  Speech Language Pathology    Date: 4/5/2019  Patient Name: Matilde Justin  YOB: 1950   AGE: 81 y.o. MRN: [id number]        Patient Not Available for Speech Therapy     Due to:  [] Testing  [] Hemodialysis  [] Cancelled by RN  [] Surgery   [] Intubation/Sedation/Pain Medication  [] Medical instability  [x] Other:  Pt. Off floor for testing    Next scheduled treatment: 4/8/19  Completed by:  Lennox Harness, M.S. [address] [facility]

## 2019-04-05 NOTE — PLAN OF CARE
MOBILIZE SECRETIONS  [x]   ASSESS BREATH SOUNDS  [x]   ASSESS SPUTUM PRODUCTION  [x]   COUGH AND DEEP BREATHING    ATELECTASIS   [x]  PREVENT ATELECTASIS  [x]   ASSESS BREATH SOUNDS    NON INVASIVE VENTILATION  PROVIDE OPTIMAL VENTILATION/ACCEPTABLE SP02  IMPLEMENT NON INVASIVE VENTILATION PROTOCOL  ASSESSMENT SKIN INTEGRITY  PATIENT EDUCATION AS NEEDED  BIPAP AS NEEDED

## 2019-04-05 NOTE — PROCEDURES
[address] [facility]                              CARDIAC STRESS TEST    PATIENT NAME: Rafael Yeboah                       :        1950  MED REC NO:   [id number]                             ROOM:       93  ACCOUNT NO:   [de-identified]                           ADMIT DATE: 2019  PROVIDER:     Mary Acosta    DATE OF STUDY:  2019    ORDERING PROVIDER:  Hunter Kirk MD  PRIMARY CARE PROVIDER:  Sidney Long MD  INTERPRETING PHYSICIAN:  Mary Acosta MD    LEXISCAN STRESS STUDY:  Indication:  chest pain  Procedure explained and consent signed    Medications:  Lexiscan, 0.4 mg  Resting heart rate:  77  Resting blood pressure:  147/76  Infusion heart rate:  92  Infusion blood pressure:  147/76  Resting EKG:  Abnormal (Atrial fibrillation/ST-T wave abnormality  inferior/anterolateral ischemia)  Stress heart response:  Normal response  Stress BP response:  Appropriate  Stress EKG(S):  No changes seen  Chest discomfort:  None  Ischemic EKG changes:  None    IMPRESSION:  Electrocardiographically negative Lexiscan stress study. Radio-isotope  results to follow from the department of Nuclear Medicine.         DESHAWN CHRISTOPHER    D: 2019 14:54:32       T: 2019 14:55:53     /IRINA

## 2019-04-05 NOTE — PROGRESS NOTES
Stress RN notified patient was not NPO. Discussed with Naomi Roth CNP who ordered the stress test.  Testing on hold.

## 2019-04-05 NOTE — CARE COORDINATION
Attempt to follow-up with patient/family for care transitions. Patient not in room at time of attempt. Family not present. Follow-up with Case Management for unit. Confirmed plan for discharge to [facility]. Care Transitions will continue to follow with this patient during the course of her current admission. Outreach after discharge by care transitions pending her final discharge plan/disposition.

## 2019-04-05 NOTE — PROGRESS NOTES
Physical Therapy  DATE: 2019    NAME: Goldie Chandler  MRN: [id number]   : 1950    Patient not seen this date for Physical Therapy due to:  [] Blood transfusion in progress  [] Hemodialysis  []  Patient Declined  [] Spine Precautions   [] Strict Bedrest  [] Surgery/ Procedure  [x] Testing- pt leaving for stress test with transport. PT will check back as time allows. [] Other        [] PT being discontinued at this time. Patient independent. No further needs. [] PT being discontinued at this time as the patient has been transferred to palliative care. No further needs.     Emily Howard, PT

## 2019-04-05 NOTE — PLAN OF CARE
Problem: HEMODYNAMIC STATUS  Goal: Patient has stable vital signs and fluid balance  Outcome: Met This Shift   Neuro assessment completed, fall precautions in place, aspirations precautions in place, assess for barriers in communication and mobility, interventions to assist in communication and mobility in place, encouraged to call for assistance, adaptive devices used as needed, assess emotional state and support offered, encouraged patient to communicate by available means, and support systems included in patient care.

## 2019-04-06 NOTE — PROGRESS NOTES
Dependent/Total  Transfers  Comment: Sat pt at EOB: DEP x 3 minutes  Ambulation 1  Comments: Pt DEP for transfers. Other exercises  Other exercises?: Yes  Other exercises 1: PROM x 4 extremities x10 reps. Other exercises 2: Sat at EOB x3 minutes DEP  Other exercises 3: Pt repositioned on right side, wedge under left side. Nursing notified. Assessment   Body structures, Functions, Activity limitations: Decreased functional mobility ; Decreased endurance;Decreased ROM; Decreased strength;Decreased safe awareness;Decreased balance;Decreased ADL status  Assessment: Patient DEP for bed mobility. PROM: Left UE and LE. Right hip trace contraction with hip extension. Right UE AROM limited by stiffness. Pt was able to lift right UE approx 2 inches off bed. DEP for sitting EOB x3 minutes.   Activity Tolerance  Activity Tolerance: Patient Tolerated treatment well            Goals  Short term goals  Time Frame for Short term goals: 14 visits  Short term goal 1: Perform bed mobility with Mod A  Short term goal 2: Perform sit to stand transfer with Max A  Short term goal 3: Demo Poor+ dynamic standing balance with appropriate AD  Short term goal 4: Ambulate 30ft with green lift walker and Mod A  Short term goal 5: Participate in 30 minutes of therapy to demo increased endurance    Plan    Plan  Times per week: 5-6x/wk  Current Treatment Recommendations: Strengthening, Balance Training, Functional Mobility Training, Transfer Training, Endurance Training, Patient/Caregiver Education & Training, Safety Education & Training, Home Exercise Program, ROM, Neuromuscular Re-education, Gait Training  Plan Comment: attempt rich fernandez if appropriate  Safety Devices  Type of devices: Call light within reach, Nurse notified, Left in bed, Bed alarm in place  Restraints  Initially in place: (neuro deshawn R hand)     Therapy Time   Individual Concurrent Group Co-treatment   Time In 1345         Time Out 1430         Minutes 45

## 2019-04-06 NOTE — PLAN OF CARE
NONINVASIVE VENTILATION     PROVIDE OPTIMAL VENTILATION/ACCEPTABLE SPO2              IMPLEMENT NONINVASIVE VENTILATION PROTOCOL              MAINTAIN ACCEPTABLE SPO2              ASSESS SKIN INTEGRITY/BREAKDOWN SCORE              PATIENT EDUCATION AS NEEDED              BIPAP AS NEEDED

## 2019-04-06 NOTE — PROGRESS NOTES
[facility]   Progress Note                   Date:   4/6/2019  Patient name:  Anderson Goddard  Date of admission:  3/28/2019  1:05 AM  MRN:   [id number]  YOB: 1950  PCP: Sina Melgoza DO    Reason for Admission:  CVA     Subjective:       No overnight events, BP and HR stable, patient non-verbal but in no distress, underwent stress test     Medications:   Scheduled Meds:   furosemide  20 mg Intravenous Daily    midodrine  10 mg Oral TID WC    rivaroxaban  20 mg Oral Daily    senna  1 tablet Oral Nightly    aspirin  81 mg Oral Daily    atorvastatin  40 mg Oral Nightly    sodium chloride flush  10 mL Intravenous 2 times per day    lidocaine   Topical Once       Continuous Infusions:    CBC:   Recent Labs     04/04/19  0426 04/05/19  0541 04/06/19  0613   WBC 12.3* 9.2 10.3   HGB 11.7* 12.3 12.9    192 236     BMP:    Recent Labs     04/04/19  0426 04/04/19  1644 04/05/19  0541 04/06/19  0613     --  141 141   K 3.1* 3.6* 3.8 3.6*     --  105 103   CO2 28  --  26 28   BUN 27*  --  23 18   CREATININE 0.78  --  0.70 0.59   GLUCOSE 108*  --  136* 144*       Objective:   Vitals: /68   Pulse 71   Temp 98.8 °F (37.1 °C) (Oral)   Resp 19   Ht 5' 4\" (1.626 m)   Wt 108 lb 7.5 oz (49.2 kg)   SpO2 100%   BMI 18.62 kg/m²     General appearance: lethargic, non-verbal, in no distress on 2L NC  HEENT: NG tube in place   Neck: no JVD  Lungs: reduced at bases, no basilar rales, no wheezing   Heart: regular rate and rhythm, S1, S2 normal, no murmur,  Abdomen: soft, non-tender; bowel sounds normal  Extremities: No LE edema  Neurologic: not done       EKG:  SR, T wave abnormality suggesting anterolateral ischemia     Stress test 4/5/2019  Technically suboptimal exam with numerous artifacts and GI tract activity.       Large inferior wall infarct with isaak-infarct ischemia.  This extends into   the apical region.       Global hypokinesis most pronounced in the inferior wall.       LVEF 28%. Echocardiogram 3/28/19  Left ventricle is normal in size. Global left ventricular systolic function is moderately reduced. Estimated ejection fraction is 35 % . The apex and apical segments are hypokinetic. Grade I (mild) left ventricular diastolic dysfunction. Pacemaker / ICD lead seen in right atrium and ventricle. Thickened mitral valve leaflets. Mild mitral regurgitation. Mild tricuspid regurgitation. Estimated right ventricular systolic pressure is 25 mmHg      Coronary Angiography in 2008:   Normal coronaries     Assessment:   1. Altered mental status/Aphasia due to CVA   2. PAF currently in NSR   3.. Chronic systolic heart failure due to Non-ischemic Cardiomyopathy S/P ICD. 4. Multiple CVAs with non-compliance with anticoagulation. 5. HTN. 6. Hyperlipidemia. 7. Abnormal stress test       Patient Active Problem List:     Elevated fasting glucose     Nonischemic cardiomyopathy (HCC)     Hyperlipidemia     Anxiety     History of tobacco abuse     Fibrocystic breast disease     Mild mitral regurgitation     Sick sinus syndrome (HCC)     Automatic implantable cardioverter-defibrillator in situ     Cerebral infarction ([address]     Superior mesenteric vein thrombosis     Functional urinary incontinence     Expressive aphasia     Stroke-like symptoms     Acute encephalopathy     History of cerebral infarction     History of implantable cardioverter-defibrillator (ICD) placement     Hiatal hernia     Chronic atrial fibrillation (HCC)     Acute ischemic stroke (HCC)     Aspiration pneumonia of both upper lobes (HCC)     Moderate malnutrition ([address]      Treatment Plan:   1. D.C IVF   2. Start IV lasix 20 mg qd   3. Cont asa and lipitor   4. Wean off midodrine   5. Start low dose BB if BP remains stable   6. Stress test reviewed, sub-optimal study but possible isaak infarct inferior ischemia, will need C but once acute issues are reoslved and she is recovered from CVA.  This can adrien done as OP in 6-8 weeks. 7. Her ICD site looks ok without any issues       Discussed with nursing.        Meera Charles MD  [address] [facility]   [place]

## 2019-04-06 NOTE — CARE COORDINATION
Transition planning:  Met with pt she nods yes that her plan is to transition to [facility] when ready to transition from the hospital.     Review of IM notes from today  plan for repeat swallow study early next week if fails plan for PEG. Cardiology note relays pt will need heart cath as OP in 6-8 weeks.

## 2019-04-06 NOTE — PLAN OF CARE
NONINVASIVE VENTILATION     PROVIDE OPTIMAL VENTILATION/ACCEPTABLE SPO2              IMPLEMENT NONINVASIVE VENTILATION PROTOCOL              MAINTAIN ACCEPTABLE SPO2              ASSESS SKIN INTEGRITY/BREAKDOWN SCORE              PATIENT EDUCATION AS NEEDED              BIPAP AS NEEDED    MOBILIZE SECRETIONS  [x]   ASSESS BREATH SOUNDS  [x]   ASSESS SPUTUM PRODUCTION  [x]   COUGH AND DEEP BREATHING  []  IMPLEMENT SECRETION MANAGEMENT PROTOCOL  [x]   PATIENT EDUCATION AS NEEDED

## 2019-04-06 NOTE — PROGRESS NOTES
Lorraine Buchanan 19    Progress Note    4/6/2019    3:57 PM    Name:   Miguel Panda  MRN:     [id number]     Acct:      [de-identified]   Room:   [address] [place] [address] Day:  9  Admit Date:  3/28/2019  1:05 AM    PCP:   Jerry Mike DO  Code Status:  Full Code    Subjective:     C/C:   Chief Complaint   Patient presents with    Cerebrovascular Accident     Interval History Status: improved. Patient seen and examined at bedside, no acute events overnight. Calm, no agitation, follows commands, had stress test yesterday with asked inferior wall defect with suspicious  reversible inferior wall ischemia   Patient vitals, labs and all providers notes were reviewed,from overnight shift and morning updates were noted and discussed with the nurse    Brief History:     The patient is a 81 y.o. Non-/non  female who presents with Cerebrovascular Accident   and she is admitted to the hospital for the management of  CVA  Per chart  \" Initial Presentation (Admitted 3/28): The patient is B 61 y.o. female presented with presents with concern for CVA.  Transfer from [facility] ED, presented with NIH 21.  Last known well 1200 3/27/18.  Previous records indicate pt found by family w/ AMS, aphasia, Left sided deficits.  PMH of previous CVA, afib as on outpatient Xarelto but pt discontinued a few months.  Previous dysarthria, no reported residual weakness.   [facility] Course:   3/29: Intubated overnight for impending respiratory Failure. Copious secreations, remains on zosyn, zithromax.   3/30: Started on norepinephrine drip. Troponins are trending down.   3/31: remained on pressors, switch to dobutamine from levophed   4/1:   No acute events overnight.  Levophed switched to dobutamine yesterday, titrated up to 5 mcgs overnight for goal systolic blood pressure greater than 100.  Patient remains intubated, sedated.  Patient seen and evaluated at Intravenous 2 times per day    lidocaine   Topical Once     Continuous Infusions:   PRN Meds: sodium chloride flush, sodium chloride flush, diphenoxylate-atropine, potassium chloride, fentanNYL, sodium chloride flush, magnesium hydroxide, ondansetron, acetaminophen, hydrALAZINE    Data:     Past Medical History:   has a past medical history of Anxiety, Blurred vision, bilateral, Bradycardia, Chronic renal insufficiency, Constipation, Depression, Elevated fasting glucose, Encephalomalacia, Fibrocystic breast disease, Frontal headache, Genital atrophy of female, Grief reaction, Hiatal hernia, History of tobacco abuse, Hyperlipidemia, Ileus ([address] Mild mitral regurgitation, Nonischemic cardiomyopathy ([address] Overweight, Paraesophageal hiatal hernia, Sick sinus syndrome ([address] Stroke ([address] Superior mesenteric vein thrombosis, and Tinnitus of right ear. Social History:   reports that she quit smoking about 12 years ago. She has a 13.00 pack-year smoking history. She has never used smokeless tobacco. She reports that she does not drink alcohol or use drugs. Family History:   Family History   Problem Relation Age of Onset    Breast Cancer Mother 62    Cancer Mother         pancreatic    Diabetes Mother         \"borderline\"    High Blood Pressure Mother     Mental Illness Mother     Cancer Father         laryngeal    Diabetes Father     Stroke Maternal Grandmother     Diabetes Maternal Grandmother     Stroke Maternal Grandfather     Diabetes Maternal Grandfather     Stomach Cancer Paternal Grandmother     COPD Other        Vitals:  /68   Pulse 71   Temp 98.8 °F (37.1 °C) (Oral)   Resp 19   Ht 5' 4\" (1.626 m)   Wt 108 lb 7.5 oz (49.2 kg)   SpO2 100%   BMI 18.62 kg/m²   Temp (24hrs), Av.6 °F (37 °C), Min:97.8 °F (36.6 °C), Max:99.1 °F (37.3 °C)    No results for input(s): POCGLU in the last 72 hours. I/O (24Hr):     Intake/Output Summary (Last 24 hours) at 2019 5530  Last data filed at 4/5/2019 1600  Gross per 24 hour   Intake 309 ml   Output --   Net 309 ml       Labs:    Hematology:  Recent Labs     04/04/19  0426 04/05/19  0541 04/06/19  0613   WBC 12.3* 9.2 10.3   RBC 3.82* 4.00 4.24   HGB 11.7* 12.3 12.9   HCT 36.0* 39.0 40.9   MCV 94.2 97.5 96.5   MCH 30.6 30.8 30.4   MCHC 32.5 31.5 31.5   RDW 13.7 13.5 13.6    192 236   MPV 11.2 10.9 10.8     Chemistry:  Recent Labs     04/04/19  0426 04/04/19  1644 04/05/19  0541 04/06/19  0613     --  141 141   K 3.1* 3.6* 3.8 3.6*     --  105 103   CO2 28  --  26 28   GLUCOSE 108*  --  136* 144*   BUN 27*  --  23 18   CREATININE 0.78  --  0.70 0.59   MG 2.0  --  2.0 2.0   ANIONGAP 9  --  10 10   LABGLOM >60  --  >60 >60   GFRAA >60  --  >60 >60   CALCIUM 8.3*  --  8.3* 8.3*   PHOS 2.9  --  2.5* 2.9     No results for input(s): PROT, LABALBU, LABA1C, L2CLDTR, B3SBJCL, FT4, TSH, AST, ALT, LDH, GGT, ALKPHOS, LABGGT, BILITOT, BILIDIR, AMMONIA, AMYLASE, LIPASE, LACTATE, CHOL, HDL, LDLCHOLESTEROL, CHOLHDLRATIO, TRIG, VLDL, BAJ82CJ, PHENYTOIN, PHENYF, URICACID, POCGLU in the last 72 hours. Lab Results   Component Value Date/Time    SPECIAL NOT REPORTED 03/29/2019 03:54 AM     Lab Results   Component Value Date/Time    CULTURE NORMAL RESPIRATORY SHEILA  LIGHT GROWTH   03/29/2019 03:54 AM       Lab Results   Component Value Date    POCPH 7.442 04/02/2019    POCPCO2 38.5 04/02/2019    POCPO2 79.0 04/02/2019    POCHCO3 26.3 04/02/2019    NBEA NOT REPORTED 04/02/2019    PBEA 2 04/02/2019    DCT6JHA 27 04/02/2019    PONG4RLQ 96 04/02/2019    FIO2 30.0 04/02/2019       Radiology:        Physical Examination:        General Appearance:  alert, well appearing, and in no acute distress  Mental status: Unable to assess  Head:  normocephalic, atraumatic.   Eye: no icterus, redness, pupils equal and reactive, extraocular eye movements intact, conjunctiva clear  Ear: normal external ear, no discharge, hearing intact  Nose:  no drainage noted, NGT  Mouth: mucous membranes moist  Neck: supple, no carotid bruits, thyroid not palpable  Lungs: bibasilar crakles,  normal effort  Cardiovascular: normal rate, regular rhythm, no murmur, gallop, rub.   Abdomen: Soft, nontender, nondistended, normal bowel sounds, no hepatomegaly or splenomegaly  Neurologic: muscle strength 1/5 on L side, 4/5 on the right side  normal muscle tone and bulk, aphasia, cant fully assess  Skin: No gross lesions, rashes, bruising or bleeding on exposed skin area  Extremities:  peripheral pulses palpable, no pedal edema or calf pain with palpation      Assessment:        Primary Problem  Acute ischemic stroke [facility])    Active Hospital Problems    Diagnosis Date Noted    Acute ischemic stroke [facility]) [I63.9]      Priority: High    Moderate malnutrition ([facility] [E44.0] 04/03/2019    Aspiration pneumonia of both upper lobes ([facility] [J69.0]     Stroke-like symptoms [R29.90] 03/28/2019    Acute encephalopathy [G93.40]     History of cerebral infarction [Z86.73]     History of implantable cardioverter-defibrillator (ICD) placement [Z95.810]     Hiatal hernia [K44.9]     Chronic atrial fibrillation ([facility] [I48.2]        Plan:        Xarelto resumed, stroke most likely embolic  Continue aspirin and statin   Stress test results reviewed, await further cardiology given the  Inf wall reversible ischemia   DC fluids given her EF   Diuresis, gentle given borderline BP  continue tube feeds   Replace phosphorus & K  DC Azithromycin and monitor off Abx  Titrate off O2   Plan to repeat swallow study earlier next week  G tube if failed swallow study  DVT & GI PPx      Adriane Chew MD  4/6/2019  3:57 PM

## 2019-04-07 NOTE — PLAN OF CARE
Problem: HEMODYNAMIC STATUS  Goal: Patient has stable vital signs and fluid balance  4/7/2019 0530 by Julia Leung RN  Outcome: Met This Shift     Problem: Falls - Risk of:  Goal: Will remain free from falls  Description  Will remain free from falls  4/7/2019 0530 by Julia Leung RN  Outcome: Met This Shift     Problem: Falls - Risk of:  Goal: Absence of physical injury  Description  Absence of physical injury  4/7/2019 0530 by Julia Leung RN  Outcome: Met This Shift     Problem: OXYGENATION/RESPIRATORY FUNCTION  Goal: Patient will maintain patent airway  4/7/2019 0530 by Julia Leung RN  Outcome: Met This Shift     Problem: OXYGENATION/RESPIRATORY FUNCTION  Goal: Patient will achieve/maintain normal respiratory rate/effort  Description  Respiratory rate and effort will be within normal limits for the patient  4/7/2019 0530 by Julia Leung RN  Outcome: Met This Shift     Problem: Risk for Impaired Skin Integrity  Goal: Tissue integrity - skin and mucous membranes  Description  Structural intactness and normal physiological function of skin and  mucous membranes.   4/7/2019 0530 by Julia Leung RN  Outcome: Ongoing     Problem: SKIN INTEGRITY  Goal: Skin integrity is maintained or improved  4/7/2019 0530 by Julia Leung RN  Outcome: Ongoing

## 2019-04-07 NOTE — PROGRESS NOTES
Lorraine Buchanan 19    Progress Note    4/7/2019    11:42 AM    Name:   Silva Mcneil  MRN:     [id number]     Acct:      [de-identified]   Room:   [address] [place] [address] Day:  10  Admit Date:  3/28/2019  1:05 AM    PCP:   Ralph Looney DO  Code Status:  Full Code    Subjective:     C/C:   Chief Complaint   Patient presents with    Cerebrovascular Accident     Interval History Status: improved. Patient seen and examined at bedside, no acute events overnight. Calm, no agitation, follows commands, had stress test yesterday with asked inferior wall defect with suspicious  reversible inferior wall ischemia   Patient vitals, labs and all providers notes were reviewed,from overnight shift and morning updates were noted and discussed with the nurse    Brief History:     The patient is a 81 y.o. Non-/non  female who presents with Cerebrovascular Accident   and she is admitted to the hospital for the management of  CVA  Per chart  \" Initial Presentation (Admitted 3/28): The patient is E 89 y.o. female presented with presents with concern for CVA.  Transfer [facility] ED, presented with NIH 21.  Last known well 1200 3/27/18.  Previous records indicate pt found by family w/ AMS, aphasia, Left sided deficits.  PMH of previous CVA, afib as on outpatient Xarelto but pt discontinued a few months.  Previous dysarthria, no reported residual weakness.   [facility] Course:   3/29: Intubated overnight for impending respiratory Failure. Copious secreations, remains on zosyn, zithromax.   3/30: Started on norepinephrine drip. Troponins are trending down.   3/31: remained on pressors, switch to dobutamine from levophed   4/1:   No acute events overnight.  Levophed switched to dobutamine yesterday, titrated up to 5 mcgs overnight for goal systolic blood pressure greater than 100.  Patient remains intubated, sedated.  Patient seen and evaluated at Intravenous 2 times per day    lidocaine   Topical Once     Continuous Infusions:   PRN Meds: sodium chloride flush, sodium chloride flush, diphenoxylate-atropine, potassium chloride, fentanNYL, sodium chloride flush, magnesium hydroxide, ondansetron, acetaminophen, hydrALAZINE    Data:     Past Medical History:   has a past medical history of Anxiety, Blurred vision, bilateral, Bradycardia, Chronic renal insufficiency, Constipation, Depression, Elevated fasting glucose, Encephalomalacia, Fibrocystic breast disease, Frontal headache, Genital atrophy of female, Grief reaction, Hiatal hernia, History of tobacco abuse, Hyperlipidemia, Ileus ([address] Mild mitral regurgitation, Nonischemic cardiomyopathy ([address] Overweight, Paraesophageal hiatal hernia, Sick sinus syndrome ([address] Stroke ([address] Superior mesenteric vein thrombosis, and Tinnitus of right ear. Social History:   reports that she quit smoking about 12 years ago. She has a 13.00 pack-year smoking history. She has never used smokeless tobacco. She reports that she does not drink alcohol or use drugs. Family History:   Family History   Problem Relation Age of Onset    Breast Cancer Mother 62    Cancer Mother         pancreatic    Diabetes Mother         \"borderline\"    High Blood Pressure Mother     Mental Illness Mother     Cancer Father         laryngeal    Diabetes Father     Stroke Maternal Grandmother     Diabetes Maternal Grandmother     Stroke Maternal Grandfather     Diabetes Maternal Grandfather     Stomach Cancer Paternal Grandmother     COPD Other        Vitals:  BP (!) 112/53   Pulse 73   Temp 97.9 °F (36.6 °C) (Oral)   Resp 20   Ht 5' 4\" (1.626 m)   Wt 108 lb 7.5 oz (49.2 kg)   SpO2 100%   BMI 18.62 kg/m²   Temp (24hrs), Av.8 °F (36.6 °C), Min:97.5 °F (36.4 °C), Max:98.2 °F (36.8 °C)    No results for input(s): POCGLU in the last 72 hours. I/O (24Hr):     Intake/Output Summary (Last 24 hours) at 2019 1142  Last data filed at 4/7/2019 0528  Gross per 24 hour   Intake 952 ml   Output 2600 ml   Net -1648 ml       Labs:    Hematology:  Recent Labs     04/05/19  0541 04/06/19  0613 04/07/19  0521   WBC 9.2 10.3 9.8   RBC 4.00 4.24 4.11   HGB 12.3 12.9 12.6   HCT 39.0 40.9 39.6   MCV 97.5 96.5 96.4   MCH 30.8 30.4 30.7   MCHC 31.5 31.5 31.8   RDW 13.5 13.6 13.4    236 217   MPV 10.9 10.8 11.0     Chemistry:  Recent Labs     04/05/19 0541 04/06/19  0613 04/07/19 0521    141 139   K 3.8 3.6* 3.5*    103 99   CO2 26 28 31   GLUCOSE 136* 144* 131*   BUN 23 18 19   CREATININE 0.70 0.59 0.67   MG 2.0 2.0 2.1   ANIONGAP 10 10 9   LABGLOM >60 >60 >60   GFRAA >60 >60 >60   CALCIUM 8.3* 8.3* 8.6   PHOS 2.5* 2.9 2.7     No results for input(s): PROT, LABALBU, LABA1C, O8GMKAQ, O7RUUHN, FT4, TSH, AST, ALT, LDH, GGT, ALKPHOS, LABGGT, BILITOT, BILIDIR, AMMONIA, AMYLASE, LIPASE, LACTATE, CHOL, HDL, LDLCHOLESTEROL, CHOLHDLRATIO, TRIG, VLDL, PCS68WC, PHENYTOIN, PHENYF, URICACID, POCGLU in the last 72 hours. Lab Results   Component Value Date/Time    SPECIAL NOT REPORTED 03/29/2019 03:54 AM     Lab Results   Component Value Date/Time    CULTURE NORMAL RESPIRATORY SHEILA  LIGHT GROWTH   03/29/2019 03:54 AM       Lab Results   Component Value Date    POCPH 7.442 04/02/2019    POCPCO2 38.5 04/02/2019    POCPO2 79.0 04/02/2019    POCHCO3 26.3 04/02/2019    NBEA NOT REPORTED 04/02/2019    PBEA 2 04/02/2019    YQU4ASF 27 04/02/2019    RFFC9AKW 96 04/02/2019    FIO2 30.0 04/02/2019       Radiology:        Physical Examination:        General Appearance:  alert, well appearing, and in no acute distress  Mental status: Unable to assess  Head:  normocephalic, atraumatic.   Eye: no icterus, redness, pupils equal and reactive, extraocular eye movements intact, conjunctiva clear  Ear: normal external ear, no discharge, hearing intact  Nose:  no drainage noted, NGT  Mouth: mucous membranes moist  Neck: supple, no carotid bruits, thyroid not palpable  Lungs: bibasilar crakles,  normal effort  Cardiovascular: normal rate, regular rhythm, no murmur, gallop, rub.   Abdomen: Soft, nontender, nondistended, normal bowel sounds, no hepatomegaly or splenomegaly  Neurologic: muscle strength 1/5 on L side, 4/5 on the right side  normal muscle tone and bulk, aphasia, cant fully assess  Skin: No gross lesions, rashes, bruising or bleeding on exposed skin area  Extremities:  peripheral pulses palpable, no pedal edema or calf pain with palpation      Assessment:        Primary Problem  Acute ischemic stroke [facility])    Active Hospital Problems    Diagnosis Date Noted    Acute ischemic stroke [facility]) [I63.9]      Priority: High    Moderate malnutrition ([facility] [E44.0] 04/03/2019    [facility] both upper lobes [facility] [J69.0]     Stroke-like symptoms [R29.90] 03/28/2019    Acute encephalopathy [G93.40]     History of cerebral infarction [Z86.73]     History of implantable cardioverter-defibrillator (ICD) placement [Z95.810]     Hiatal hernia [K44.9]     Chronic atrial fibrillation ([facility] [I48.2]        Plan:        Xarelto resumed, stroke most likely embolic  Continue aspirin and statin   Stress test results reviewed,  Inf wall reversible ischemia, need LHC when more stable  DC fluids   Diuresis, gentle given borderline BP  continue tube feeds   Replace phosphorus & K  Switch midodrine to prn and monitor SBP  DC Azithromycin and monitor off Abx  Titrate off O2   Plan to repeat swallow study earlier next week  G tube if failed swallow study  DVT & GI PPx      Stephanie West MD  4/7/2019  11:42 AM

## 2019-04-07 NOTE — PLAN OF CARE
Neuro assessment completed, fall precautions in place, aspirations precautions in place, assess for barriers in communication and mobility, interventions to assist in communication and mobility in place, encouraged to call for assistance, adaptive devices used as needed, assess emotional state and support offered, encouraged patient to communicate by available means, and support systems included in patient care.

## 2019-04-08 PROBLEM — G81.94 LEFT HEMIPARESIS (HCC): Status: ACTIVE | Noted: 2019-01-01

## 2019-04-08 PROBLEM — I69.30 MULTI INFARCT STATE: Status: ACTIVE | Noted: 2019-01-01

## 2019-04-08 PROBLEM — R47.01 APHASIA: Status: ACTIVE | Noted: 2019-01-01

## 2019-04-08 NOTE — PROGRESS NOTES
Physical Therapy  DATE: 2019  NAME: Anderson Goddard  MRN: [id number]   : 1950      Discharge Recommendations Further therapy recommended at discharge. Subjective: RN agreeable to ROM. Pain: Nods no to pain  Patient follows: All commands  Is patient on ventilator: No  Is patient on sedation: Yes  Precautions: General    Therapeutic exercises: for BUE and BLE  AAROM     all planes x  15reps  and . Bilateral gastrocnemius stretching 30\" x3  Stretching to R biceps 30'' x 3. Tightness noted. Goals  Short Term Goals  Short term goal 1: Perform bed mobility with Mod A  Short term goal 2: Perform sit to stand transfer with Max A  Short term goal 3: Demo Poor+ dynamic standing balance with appropriate AD  Short term goal 4: Ambulate 30ft with green lift walker and Mod A  Short term goal 5: Participate in 30 minutes of therapy to demo increased endurance    Plan: Progress functional mobility as medically appropriate.    Time In: 4:00  Time Out:4:13  Time Coded Minutes (treatment minutes): 13 mins  Rehab Potential: Good  Treatments/week: 5-6/wk    Katt Thornton PTA

## 2019-04-08 NOTE — CONSULTS
Department of Neurology                                          Resident Consult Note    Reason for Consult:  Medial right frontal infarct    History Obtained From:  patient, electronic medical record, staff    CHIEF COMPLAINT:       AMS, L sided weakness    HISTORY OF PRESENT ILLNESS:       The patient is a 81 y.o. female who presented on 03/28/19 with reported new onset left-sided weakness and speech deficits. She has past medical history of bilateral ischemic strokes, unclear baseline neuro status. Past medical history of heart disease including CAD, earlier myopathy status post ICD placement, A. Fib(was on xrelto but discontinued it by herself after a few months), CAD, anxiety, depression, or malacia, history of tobacco abuse, hyperlipidemia, stroke, sick sinus syndrome, superior mesenteric vein thrombosis. CT brain 3/28/19 was negative for any acute infarct. 03/29/19 CT head consistent with acute ischemic stroke right medial frontal, no intracranial hemorrhage. Large areas of encephalomalacia in the bilateral cerebral hemispheres which are similar to the prior study and likely related to old bilateral MCA territory infarct. Chronic small vessel ischemic disease. Small left cortical remote lacunar infarct. CTA head was done that was unremarkable. MRI  brain was not done since her ICD was not compatible WITH  MRI. Home medications :aspirin 81 , XARELTO(was on xrelto but discontinued it by herself after a few months), Lipitor 40. And was continued on aspirin and statins for secondary stroke prophylaxis. Echo with EF 35% and grade 1 diastolic dysfunction. Status post ICD placement. And was also intubated for acute hypoxic respiratory failure. She was started on tube feeds during her stay.     Herpetic anticoagulation restarted in 3-4 days from ischemic stroke due to high risk of hemorrhagic conversion in the acute infarct bed.     cardiology was also consulted. And was started on xarelto on  4/1/19. For her diaphragmatic hernia containing stomach, small ball) daily as general surgery was consulted. Patient was not a surgical candidate during this admission due to her condition. Surgery recommended if patient becomes symptomatic recommend discussion with family regarding surgical correction as this is a very large defect and wouldn't require a large operation. Surgery signed off. Patient had L TME done that was abnormal due to disorganized and mild slowing suggestive of mild encephalopathy. Frequent left frontal temporal slowing suggestive of underlying actual defect. LTME was discontinued. Became hypotensive and was started on pressors which was eventually weaned off. Patient was treated with antibiotics for pneumonia. Vision was extubated on 4/to/19. Patient was transferred out of the neuro ICU on 4/4/19. Neurology was consulted on 4/8/19 for further management and evaluation of her stroke . PAST MEDICAL HISTORY :       Past Medical History:        Diagnosis Date    Anxiety     Blurred vision, bilateral     mild.  Bradycardia     intolerant of beta blockers, intolerant of ACE inhibitors.  Chronic renal insufficiency     Constipation     Depression     Elevated fasting glucose     Encephalomalacia     parietal.     Fibrocystic breast disease     Frontal headache     bilateral, mild, transient.  Genital atrophy of female     Grief reaction     death of mother.  Hiatal hernia     extremely large.  History of tobacco abuse     now quit.  Hyperlipidemia     Ileus (HCC)     Mild mitral regurgitation     Nonischemic cardiomyopathy (HCC)     ejection fraction less than 30%, implantation of ICD.      Overweight     Paraesophageal hiatal hernia     Sick sinus syndrome ([address]     Stroke [address] [address] 07/19/2006    acute, history of stroke 07/2001 with no significant neurological deficit, thought to be due to transient atrial fibrillation, right sided hemiparesis, aphasia.  Superior mesenteric vein thrombosis     Tinnitus of right ear     constant, since . Past Surgical History:        Procedure Laterality Date    CARDIAC CATHETERIZATION  01/10/2008    significant cardiomyopathy, ejection fraction 30%, global hypokinesis, sinus bradycardia into the 40s transiently, frequent PVCs, otherwise normal.     CARDIAC DEFIBRILLATOR PLACEMENT  2013    NOT MRI CONDITIONAL-[employer] ICD MODEL [id number] SERIAL [id number] LEAD RA MODEL 531563-20 SERIAL [id number] LEAD RV 377439-59 SERIAL [id number]    GASTROSTOMY TUBE PLACEMENT  2014    attempted peg tube, unsuccessful    HYSTERECTOMY, VAGINAL  1993    total with BSO for benign tumors.  ILEOSTOMY OR JEJUNOSTOMY  2013    jejunostomy    LAPAROSCOPY  1982    with D&C.      STOMACH SURGERY      gastropexy with G-tube holding in place       Social History:   Social History     Socioeconomic History    Marital status:      Spouse name: Not on file    Number of children: Not on file    Years of education: Not on file    Highest education level: Not on file   Occupational History    Not on file   Social Needs    Financial resource strain: Not on file    Food insecurity:     Worry: Not on file     Inability: Not on file    Transportation needs:     Medical: Not on file     Non-medical: Not on file   Tobacco Use    Smoking status: Former Smoker     Packs/day: 0.50     Years: 26.00     Pack years: 13.00     Last attempt to quit: 7/15/2006     Years since quittin.7    Smokeless tobacco: Never Used    Tobacco comment: 1/2 pack a day, started in approximately , quit 2006   Substance and Sexual Activity    Alcohol use: No     Alcohol/week: 0.0 oz    Drug use: No    Sexual activity: Never   Lifestyle    Physical activity:     Days per week: Not on file     Minutes per session: Not on file    Stress: Not on file   Relationships    Social connections:     Talks on phone: Not on file     Gets together: Not on file     Attends Sikhism service: Not on file     Active member of club or organization: Not on file     Attends meetings of clubs or organizations: Not on file     Relationship status: Not on file    Intimate partner violence:     Fear of current or ex partner: Not on file     Emotionally abused: Not on file     Physically abused: Not on file     Forced sexual activity: Not on file   Other Topics Concern    Not on file   Social History Narrative    Not on file       Family History:       Problem Relation Age of Onset    Breast Cancer Mother 62    Cancer Mother         pancreatic    Diabetes Mother         \"borderline\"    High Blood Pressure Mother     Mental Illness Mother     Cancer Father         laryngeal    Diabetes Father     Stroke Maternal Grandmother     Diabetes Maternal Grandmother     Stroke Maternal Grandfather     Diabetes Maternal Grandfather     Stomach Cancer Paternal Grandmother     COPD Other        Allergies: Other; Ace inhibitors; Beta adrenergic blockers; and Adhesive tape    Home Medications:  Prior to Admission medications    Medication Sig Start Date End Date Taking? Authorizing Provider   atorvastatin (LIPITOR) 40 MG tablet take 1 tablet by mouth once daily 2/12/19   Jeferson Hollis DO   XARELTO 20 MG TABS tablet take 1 tablet by mouth once daily 10/12/18   Jeferson Hollis DO   donepezil (ARICEPT) 10 MG tablet Take 10 mg by mouth daily    Historical Provider, MD [address] [place] [address] Take by mouth daily    Historical Provider, MD   meclizine (ANTIVERT) 25 MG tablet Take 1 tablet by mouth 2 times daily as needed for Dizziness 7/2/17   Maura Cheung MD   aspirin 81 MG tablet Take 1 tablet by mouth daily.  Medication per j tube 9/2/14   Trisha Steel       Current Medications:   Current Facility-Administered Medications: furosemide (LASIX) tablet 20 mg, 20 mg, Oral, Daily  dextrose 50 % solution 25 g, 25 g, Intravenous, PRN  midodrine (PROAMATINE) tablet 5 mg, 5 mg, Oral, TID PRN  magic (miracle) mouthwash, 5 mL, Swish & Spit, 4x Daily PRN  sodium chloride flush 0.9 % injection 10 mL, 10 mL, Intravenous, PRN  sodium chloride flush 0.9 % injection 10 mL, 10 mL, Intravenous, PRN  diphenoxylate-atropine (LOMOTIL) liquid 5 mL, 5 mL, Oral, 4x Daily PRN  potassium chloride 10 mEq/100 mL IVPB (Peripheral Line), 10 mEq, Intravenous, PRN  fentaNYL (SUBLIMAZE) injection 25 mcg, 25 mcg, Intravenous, Q1H PRN  rivaroxaban (XARELTO) tablet 20 mg, 20 mg, Oral, Daily  senna (SENOKOT) tablet 8.6 mg, 1 tablet, Oral, Nightly  aspirin chewable tablet 81 mg, 81 mg, Oral, Daily  atorvastatin (LIPITOR) tablet 40 mg, 40 mg, Oral, Nightly  sodium chloride flush 0.9 % injection 10 mL, 10 mL, Intravenous, 2 times per day  sodium chloride flush 0.9 % injection 10 mL, 10 mL, Intravenous, PRN  magnesium hydroxide (MILK OF MAGNESIA) 400 MG/5ML suspension 30 mL, 30 mL, Oral, Daily PRN  ondansetron (ZOFRAN) injection 4 mg, 4 mg, Intravenous, Q6H PRN  acetaminophen (TYLENOL) suppository 650 mg, 650 mg, Rectal, Q4H PRN  hydrALAZINE (APRESOLINE) injection 5 mg, 5 mg, Intravenous, Q6H PRN  lidocaine (XYLOCAINE) 2% uro-jet, , Topical, Once    REVIEW OF SYSTEMS:       CONSTITUTIONAL: negative for fatigue and malaise   EYES: negative for double vision and photophobia    HEENT: negative for tinnitus and sore throat   RESPIRATORY: negative for cough, shortness of breath   CARDIOVASCULAR: negative for chest pain, palpitations   GASTROINTESTINAL: negative for nausea, vomiting   GENITOURINARY: negative for incontinence   MUSCULOSKELETAL: negative for neck or back pain   NEUROLOGICAL: negative for seizures   PSYCHIATRIC: negative for fatigue     Review of systems otherwise negative.     PHYSICAL EXAM:       /60   Pulse 71   Temp 97.4 °F (36.3 °C)   Resp 16   Ht 5' 4\" (1.626 m)   Wt 108 lb 7.5 oz (49.2 kg)   SpO2 100%   BMI 18.62 kg/m²     CONSTITUTIONAL:  Well developed, well nourished, alert and oriented x 3, in no acute distress. GCS 15, nontoxic. + dysarthria,+aphasia. EOMI. HEAD:  normocephalic, atraumatic    EYES:  PERRLA, EOMI.   ENT:  moist mucous membranes   NECK:  supple, symmetric, no midline tenderness to palpation    BACK:  without midline tenderness, step-offs or deformities    LUNGS:  Equal air entry bilaterally   CARDIOVASCULAR:  normal s1 / s2   ABDOMEN:  Soft, no rigidity   NEUROLOGIC:    Mental status   Alert and oriented; intact memory with no confusion, aphasic; no hallucinations or delusions     Cranial nerves   II - visual fields intact to confrontation                                                III, IV, VI - extra-ocular muscles full: no pupillary defect; no ROBERTO, no nystagmus, no ptosis                                                                      V - normal facial sensation                                                               VII - normal facial symmetry                                                             VIII - intact hearing                                                                             IX, X - symmetrical palate                                                                  XI - symmetrical shoulder shrug                                                       XII - midline tongue without atrophy or fasciculation     Motor function  Normal muscle bulk and tone;  RUE 3/5  RLE 2/5  Lue/lle 0/5   Sensory function Intact to touch, pin   Cerebellar  No involuntary movements or tremors     Reflex function Intact 1+ DTR and symmetric with no pathologic reflex or Babinski sign     Gait                  Not tested             INITIAL NIH STROKE SCALE:    Time Performed:  04:50 PM     1a. Level of consciousness:  0 - alert; keenly responsive  1b. Level of consciousness questions:  2 - answers neither question correctly  1c.   Level of consciousness questions:  0 - performs both tasks correctly  2. Best Gaze:  0 - normal  3. Visual:  0 - no visual loss  4. Facial Palsy:  0 - normal symmetric movement  5a. Motor left arm:  4 - no movement  5b. Motor right arm:  2 - some effort against gravity, limb cannot get to or maintain (if cued) 90 (or 45) degrees, drifts down to bed, but has some effort against gravity   6a. Motor left le - no movement  6b. Motor right le - no movement  7. Limb Ataxia:  Unable to assess  8. Sensory:  1 - mild to moderate sensory loss; patient feels pinprick is less sharp or is dull on the affected side; there is a loss of superficial pain with pinprick but patient is aware of being touched   9. Best Language:  3 - mute, global aphasia; no usable speech or auditory comprehension  10. Dysarthria:  2 - severe; patient speech is so slurred as to be unintelligible in the absence of or our of proportion to any dysphagia, or is mute/anarthric   11.   Extinction and Inattention:  0 - no abnormality    TOTAL:  22     SKIN:  no rash      LABS AND IMAGING:     CBC with Differential:    Lab Results   Component Value Date    WBC 4.4 2019    RBC 4.55 2019    HGB 14.1 2019    HCT 42.8 2019    PLT See Reflexed IPF Result 2019    MCV 94.1 2019    MCH 31.0 2019    MCHC 32.9 2019    RDW 13.6 2019    LYMPHOPCT 39 2019    LYMPHOPCT 3.7 2019    MONOPCT 5 2019    MONOPCT 2.6 2019    EOSPCT 0.2 2019    BASOPCT 0 2019    BASOPCT 0.8 2019    MONOSABS 0.20 2019    MONOSABS 0.3 2019    LYMPHSABS 1.71 2019    LYMPHSABS 0.5 2019    EOSABS 0.06 2019    EOSABS 0.0 2019    BASOSABS <0.03 2019    DIFFTYPE NOT REPORTED 2019     BMP:    Lab Results   Component Value Date     2019    K 5.6 2019    CL 98 2019    CO2 29 2019    BUN 19 2019    LABALBU 3.3 04/03/2019    LABALBU 4.1 03/27/2019    CREATININE 0.55 04/08/2019    CALCIUM 8.9 04/08/2019    GFRAA >60 04/08/2019    LABGLOM >60 04/08/2019    GLUCOSE 118 04/08/2019    GLUCOSE 97 03/27/2019       Radiology Review:  As above      ASSESSMENT AND PLAN:       Patient Active Problem List   Diagnosis    Elevated fasting glucose    Nonischemic cardiomyopathy (HCC)    Hyperlipidemia    Anxiety    History of tobacco abuse    Fibrocystic breast disease    Mild mitral regurgitation    Sick sinus syndrome (HCC)    Automatic implantable cardioverter-defibrillator in situ    Cerebral infarction ([address]    Superior mesenteric vein thrombosis    Functional urinary incontinence    Expressive aphasia    Stroke-like symptoms    Acute encephalopathy    History of cerebral infarction    History of implantable cardioverter-defibrillator (ICD) placement    Hiatal hernia    Chronic atrial fibrillation (HCC)    Acute ischemic stroke ([address]    Aspiration pneumonia of both upper lobes ([address]    Moderate malnutrition ([address]       81 y.o. female who presents with AMS, left-sided weakness, secondary  to A. fib. On aspirin 81 mg, Lipitor 40 mg, xarelto 20 mg daily.   - Folic acid 1mg BID   - LDL 40              -a1c pending   - PT, OT, Speech eval               -failed swallow              -aspiration pneumonia-management per primary   - Hydrate with  IVF NS @ 75cc/hr    - Telemetry    - Neuro checks per protocol  - We recommend SBP normotension  - Blood glucose goal less than 180  - Please avoid dextrose containing solutions    Additional recommendations may follow    Please contact Neurology  with any changes in patients neurologic status. Thank you for your consult.        Macy Goodell, MD   4/8/2019  4:24 PM

## 2019-04-08 NOTE — CARE COORDINATION
nHpredict Inpatient Visit    Patient/family seen: Yes, met with Libra Lomas at bedside    Provided patient/family with copy of nHpredict tool: Yes      Informed patient/family that the nHpredict is a tool, to be used as a guide, and should not alter their currently established discharge plan. Current discharge plan: [facility], SNF  Patient is mute, nodded that discharge plan is to go to SNF. Collaboration completed with case management: Yes, spoke with Jose Valentin. Patient failed swallow study today, will likely need PEG tube.

## 2019-04-08 NOTE — PROGRESS NOTES
L[facility] [address]    Progress Note    4/8/2019    6:27 PM    Name:   Ariane Reno  MRN:     [id number]     Acct:      [de-identified]   Room:   [facility] Day:  6  Admit Date:  3/28/2019  1:05 AM    PCP:   Margaret Red DO  Code Status:  Full Code    Subjective:     C/C:   Chief Complaint   Patient presents with    Cerebrovascular Accident     Interval History Status: improved. Patient seen and examined at bedside, no acute events overnight. K elevated  Calm, no agitation, follows commands, had stress test yesterday with asked inferior wall defect with suspicious  reversible inferior wall ischemia   Patient vitals, labs and all providers notes were reviewed,from overnight shift and morning updates were noted and discussed with the nurse    Brief History:      Per chart  The patient is a 81 y.o. Non-/non  female who presents with Cerebrovascular Accident   and she is admitted to the hospital for the management of  CVA  Per chart  \" Initial Presentation (Admitted 3/28): The patient is Q 6 y.o. female presented with presents with concern for CVA.  Transfer from [facility] ED, presented with NIH 21.  Last known well 1200 3/27/18.  Previous records indicate pt found by family w/ AMS, aphasia, Left sided deficits.  PMH of previous CVA, afib as on outpatient Xarelto but pt discontinued a few months.  Previous dysarthria, no reported residual weakness.   [facility] Course:   3/29: Intubated overnight for impending respiratory Failure. Copious secreations, remains on zosyn, zithromax.   3/30: Started on norepinephrine drip. Troponins are trending down.   3/31: remained on pressors, switch to dobutamine from levophed   4/1:   No acute events overnight.  Levophed switched to dobutamine yesterday, titrated up to 5 mcgs overnight for goal systolic blood pressure greater than 100.  Patient remains intubated, sedated.  Patient seen and evaluated at bedside this morning, following simple commands right upper and lower extremity.  Overall patient's condition slowly improving.     4/2:  No acute overnight events, more responsive and follows commnads, MAP above 70 as per a-line measurements, heart rhythm showed a.fib on dobtumaine running at 6 jan, afebrile, hbg 10.4 on 4/1 today 9.6, mg 1.8 and potassium 3.7 , replacement ordered to keep mg above 2 and potassium above 4. Cardiology on board for pvc,had BW today. Patient extubated  today doing well on Nc, will c/w zoysn for another day Zithromax d/c and midodrine increased to 10 mg and to wean off dobutimine  4/3:  Patient desaturate to 91 % was placed on a non breather and later on bipap and saturation improved. however cxr showed worsening opacities R>L,  therefore chest Pt started zithromax resumed and lasix 20 mg x1 given, off dobutamine and doing well neurologically. On tube feeds , swallow eval ordered didn't pass video ordered     Upon my  evaluation patient is nonverbal but quite and follow commands, Lt sided weakness,  vitals are stable, no family at bedside  Tube feeds currently on hold for a stress test scheduled today  Patient had a white medicine on her right hand but doesn't seem aggressive or agitated  Phosphorous is low           Review of Systems:     Unable to obtain as the patient is aphasic, she shakes her head no when questioned about pain    Medications: Allergies: Allergies   Allergen Reactions    Other Shortness Of Breath     Sawdust.       Ace Inhibitors Other (See Comments)     Hypotension, dizziness.  Beta Adrenergic Blockers Other (See Comments)     Hypotension, dizziness.      Adhesive Tape Rash       Current Meds:   Scheduled Meds:    furosemide  20 mg Oral Daily    rivaroxaban  20 mg Oral Daily    senna  1 tablet Oral Nightly    aspirin  81 mg Oral Daily    atorvastatin  40 mg Oral Nightly    sodium chloride flush  10 mL Intravenous 2 times 4/8/2019 1827  Last data filed at 4/7/2019 1848  Gross per 24 hour   Intake --   Output 1100 ml   Net -1100 ml       Labs:    Hematology:  Recent Labs     04/06/19  0613 04/07/19  0521 04/08/19  0809   WBC 10.3 9.8 4.4   RBC 4.24 4.11 4.55   HGB 12.9 12.6 14.1   HCT 40.9 39.6 42.8   MCV 96.5 96.4 94.1   MCH 30.4 30.7 31.0   MCHC 31.5 31.8 32.9   RDW 13.6 13.4 13.6    217 See Reflexed IPF Result   MPV 10.8 11.0 NOT REPORTED     Chemistry:  Recent Labs     04/06/19  0613 04/07/19  0521 04/07/19  1905 04/08/19  0809 04/08/19  1646    139  --  134*  --    K 3.6* 3.5* 4.3 5.6* 4.7    99  --  98  --    CO2 28 31  --  29  --    GLUCOSE 144* 131*  --  118*  --    BUN 18 19  --  19  --    CREATININE 0.59 0.67  --  0.55  --    MG 2.0 2.1  --  2.2  --    ANIONGAP 10 9  --  7*  --    LABGLOM >60 >60  --  >60  --    GFRAA >60 >60  --  >60  --    CALCIUM 8.3* 8.6  --  8.9  --    PHOS 2.9 2.7  --  3.3  --      No results for input(s): PROT, LABALBU, LABA1C, A4MGMUB, P5BMNUG, FT4, TSH, AST, ALT, LDH, GGT, ALKPHOS, LABGGT, BILITOT, BILIDIR, AMMONIA, AMYLASE, LIPASE, LACTATE, CHOL, HDL, LDLCHOLESTEROL, CHOLHDLRATIO, TRIG, VLDL, UPK99DA, PHENYTOIN, PHENYF, URICACID, POCGLU in the last 72 hours. Lab Results   Component Value Date/Time    SPECIAL NOT REPORTED 03/29/2019 03:54 AM     Lab Results   Component Value Date/Time    CULTURE NORMAL RESPIRATORY SHEILA  LIGHT GROWTH   03/29/2019 03:54 AM       Lab Results   Component Value Date    POCPH 7.442 04/02/2019    POCPCO2 38.5 04/02/2019    POCPO2 79.0 04/02/2019    POCHCO3 26.3 04/02/2019    NBEA NOT REPORTED 04/02/2019    PBEA 2 04/02/2019    OEE4COD 27 04/02/2019    SQHF8RMJ 96 04/02/2019    FIO2 30.0 04/02/2019       Radiology:        Physical Examination:        General Appearance:  alert, well appearing, and in no acute distress  Mental status: Unable to assess  Head:  normocephalic, atraumatic.   Eye: no icterus, redness, pupils equal and reactive, extraocular eye movements intact, conjunctiva clear  Ear: normal external ear, no discharge, hearing intact  Nose:  no drainage noted, NGT  Mouth: mucous membranes moist  Neck: supple, no carotid bruits, thyroid not palpable  Lungs: bibasilar crakles,  normal effort  Cardiovascular: normal rate, regular rhythm, no murmur, gallop, rub. Abdomen: Soft, nontender, nondistended, normal bowel sounds, no hepatomegaly or splenomegaly  Neurologic: muscle strength 1/5 on L side, 4/5 on the right side  normal muscle tone and bulk, aphasia, cant fully assess  Skin: No gross lesions, rashes, bruising or bleeding on exposed skin area  Extremities:  peripheral pulses palpable, no pedal edema or calf pain with palpation      Assessment:        Primary Problem  Acute ischemic stroke [facility])    Active Hospital Problems    Diagnosis Date Noted    Acute ischemic stroke [facility]) [I63.9]      Priority: High    Moderate malnutrition ([facility] [address] [E44.0] 04/03/2019    Aspiration pneumonia of both upper lobes ([facility] [J69.0]     Stroke-like symptoms [R29.90] 03/28/2019    Acute encephalopathy [G93.40]     History of cerebral infarction [Z86.73]     History of implantable cardioverter-defibrillator (ICD) placement [Z95.810]     Hiatal hernia [K44.9]     Chronic atrial fibrillation ([facility] [I48.2]        Plan:          Xarelto resumed, stroke most likely embolic  Continue aspirin and statin   Stress test results reviewed,  Inf wall reversible ischemia, need LHC when more stable  Diuresis, gentle given borderline BP.   Not on BB 2/2 her BP  continue tube feeds   DC Azithromycin and monitor off Abx  Titrate off O2   Plan to repeat swallow study today  G tube if failed swallow study  DVT & GI PPx      Moisés Layne MD  4/8/2019  6:27 PM

## 2019-04-08 NOTE — CONSULTS
General Surgery   Consultation        PATIENT NAME: Guillermina Newton OF BIRTH: 1950    ADMISSION DATE: 3/28/2019  1:05 AM     Admitting Provider: Dr. Ishmael Lawrence Physician: Dr. Elisabet Burrows DATE: 4/9/2019    Consult Regarding:  Feeding tube placement evaluation    HISTORY OF PRESENT ILLNESS:  The patient is a 81 y.o. female being consulted for feeding tube placement evaluation. She was admitted on March 28 due to right-sided stroke with left hemiparesis. Patient had NG tube placed for tube feeds. Patient is tolerating tube feeds at this time. Patient had a swallow study done today and failed. Our service was consultation for that reason. Patient is status post jejunostomy tube placement in 2014. This was done open fashion due to patient having large hiatal hernia. PEG tube was attempted at that time but was not able to be done. Status post vaginal hysterectomy. Patient's currently on Xarelto. Today's dose was held. Due to patient's current condition, history was obtained through medical records. Past Medical History:        Diagnosis Date    Anxiety     Blurred vision, bilateral     mild.  Bradycardia     intolerant of beta blockers, intolerant of ACE inhibitors.  Chronic renal insufficiency     Constipation     Depression     Elevated fasting glucose     Encephalomalacia     parietal.     Fibrocystic breast disease     Frontal headache     bilateral, mild, transient.  Genital atrophy of female     Grief reaction     death of mother.  Hiatal hernia     extremely large.  History of tobacco abuse     now quit.  Hyperlipidemia     Ileus (HCC)     Mild mitral regurgitation     Nonischemic cardiomyopathy (HCC)     ejection fraction less than 30%, implantation of ICD.      Overweight     Paraesophageal hiatal hernia     Sick sinus syndrome ([address]     Stroke ([facility] [address] 07/19/2006    acute, history of stroke 07/2001 with no significant neurological deficit, thought to be due to transient atrial fibrillation, right sided hemiparesis, aphasia.  Superior mesenteric vein thrombosis     Tinnitus of right ear     constant, since 1989. Past Surgical History:        Procedure Laterality Date    CARDIAC CATHETERIZATION  01/10/2008    significant cardiomyopathy, ejection fraction 30%, global hypokinesis, sinus bradycardia into the 40s transiently, frequent PVCs, otherwise normal.     CARDIAC DEFIBRILLATOR PLACEMENT  11/25/2013    NOT MRI CONDITIONAL-[employer] ICD MODEL [id number] SERIAL [id number] LEAD RA MODEL 878968-68 SERIAL [id number] LEAD RV [id number] SERIAL [id number]    GASTROSTOMY TUBE PLACEMENT  8-    attempted peg tube, unsuccessful    HYSTERECTOMY, VAGINAL  09/22/1993    total with BSO for benign tumors.  ILEOSTOMY OR JEJUNOSTOMY  08/30/2013    jejunostomy    LAPAROSCOPY  05/14/1982    with D&C.  STOMACH SURGERY  2015    gastropexy with G-tube holding in place       Medications Prior to Admission:   Medications Prior to Admission: atorvastatin (LIPITOR) 40 MG tablet, take 1 tablet by mouth once daily  XARELTO 20 MG TABS tablet, take 1 tablet by mouth once daily  donepezil (ARICEPT) 10 MG tablet, Take 10 mg by mouth daily  FIBER SELECT GUMMIES PO, Take by mouth daily  meclizine (ANTIVERT) 25 MG tablet, Take 1 tablet by mouth 2 times daily as needed for Dizziness  aspirin 81 MG tablet, Take 1 tablet by mouth daily. Medication per j tube    Allergies: Other; Ace inhibitors;  Beta adrenergic blockers; and Adhesive tape    Social History:   Social History     Socioeconomic History    Marital status:      Spouse name: Not on file    Number of children: Not on file    Years of education: Not on file    Highest education level: Not on file   Occupational History    Not on file   Social Needs    Financial resource strain: Not on file    Food insecurity:     Worry: Not on file     Inability: Not on file   [place] needs:      Medical: Not on file     Non-medical: Not on file   Tobacco Use    Smoking status: Former Smoker     Packs/day: 0.50     Years: 26.00     Pack years: 13.00     Last attempt to quit: 7/15/2006     Years since quittin.7    Smokeless tobacco: Never Used    Tobacco comment: 1/2 pack a day, started in approximately , quit 2006   Substance and Sexual Activity    Alcohol use: No     Alcohol/week: 0.0 oz    Drug use: No    Sexual activity: Never   Lifestyle    Physical activity:     Days per week: Not on file     Minutes per session: Not on file    Stress: Not on file   Relationships    Social connections:     Talks on phone: Not on file     Gets together: Not on file     Attends Confucianism service: Not on file     Active member of club or organization: Not on file     Attends meetings of clubs or organizations: Not on file     Relationship status: Not on file    Intimate partner violence:     Fear of current or ex partner: Not on file     Emotionally abused: Not on file     Physically abused: Not on file     Forced sexual activity: Not on file   Other Topics Concern    Not on file   Social History Narrative    Not on file       Family History:       Problem Relation Age of Onset    Breast Cancer Mother 62    Cancer Mother         pancreatic    Diabetes Mother         \"borderline\"    High Blood Pressure Mother     Mental Illness Mother     Cancer Father         laryngeal    Diabetes Father     Stroke Maternal Grandmother     Diabetes Maternal Grandmother     Stroke Maternal Grandfather     Diabetes Maternal Grandfather     Stomach Cancer Paternal Grandmother     COPD Other        REVIEW OF SYSTEMS:      Unable to obtain due to patient's condition        PHYSICAL EXAM:    VITALS:  /66   Pulse 85   Temp 98.2 °F (36.8 °C) (Axillary)   Resp 20   Ht 5' 4\" (1.626 m)   Wt 108 lb 7.5 oz (49.2 kg)   SpO2 100%   BMI 18.62 kg/m²   INTAKE/OUTPUT:   No intake or output data in Aspiration pneumonia of both upper lobes (HCC)    Moderate malnutrition (HCC)    Multi infarct state    Left [address]ip[address] [address]    Aphasia       81 y.o. female with dysphagia. Failed swallow study today. Admitted on 3/28 due to stroke with left sided weakness. Has large diaphragmatic hiatal hernia with most of the stomach in chest cavity. PLAN    1. Continue TF through NGT. 2. Hold Xarelto. If AC needed, will recommend starting heparin drip. 3. Will ask medicine team for risk stratification. 4. If feeding tube placement continues to be indicated on Thursday, will proceed for laparoscopic J-tube or G-tube placement, possible open that day. Electronically signed by Mary Wilson DO  on 4/9/2019 at 3:39 AM     Patient seen and examined. Agree with above assessment and plan. Patient nonverbal but I did discuss the plan with her. Given her large hiatal hernia and her previous surgical history, there are several options. We'll plan on possible reduction of the stomach laparoscopically and placement of a PEG tube. However, if this is not doable then we will proceed with a feeding jejunostomy tube. Xarelto has been stopped and we'll plan on surgery on Thursday.

## 2019-04-08 NOTE — PROCEDURES
Pt inappropriate to complete PHQ-9 at this time. Pt confused and aphasic. Just nods yes to all questions.

## 2019-04-08 NOTE — PROGRESS NOTES
[facility]   Progress Note                   Date:   4/8/2019  Patient name: Baron Simons  Date of admission:  3/28/2019  1:05 AM  MRN:   [id number]  YOB: 1950  PCP: Hubert Reyes DO    Reason for Admission: Stroke-like symptom [R29.90]    Subjective:       Clinical Changes / Abnormalities: Pt seen and examined lying quietly in bed. Patient calm, cooperative, and following commands. NG in place Tele reviewed - Afib with demand pacing. Stress test reviewed. No new cardiac issues/concers. Medications:   Scheduled Meds:   potassium chloride  20 mEq Oral Daily    furosemide  20 mg Intravenous Daily    rivaroxaban  20 mg Oral Daily    senna  1 tablet Oral Nightly    aspirin  81 mg Oral Daily    atorvastatin  40 mg Oral Nightly    sodium chloride flush  10 mL Intravenous 2 times per day    lidocaine   Topical Once     Continuous Infusions:    CBC:   Recent Labs     04/06/19  0613 04/07/19  0521 04/08/19  0809   WBC 10.3 9.8 4.4   HGB 12.9 12.6 14.1    217 See Reflexed IPF Result     BMP:    Recent Labs     04/06/19  0613 04/07/19  0521 04/07/19  1905 04/08/19  0809    139  --  134*   K 3.6* 3.5* 4.3 5.6*    99  --  98   CO2 28 31  --  29   BUN 18 19  --  19   CREATININE 0.59 0.67  --  0.55   GLUCOSE 144* 131*  --  118*     Hepatic:   No results for input(s): AST, ALT, ALB, BILITOT, ALKPHOS in the last 72 hours. Troponin:   No results for input(s): TROPHS in the last 72 hours. BNP: No results for input(s): BNP in the last 72 hours. Lipids:   No results for input(s): CHOL, HDL in the last 72 hours. Invalid input(s): LDLCALCU  INR:   No results for input(s): INR in the last 72 hours. Diagnostics:    Cardiac Testing      ICD generator change 11/25/13: Medtronic AK     CATH 1/10/08: NICM. EF 30%     Dr. Matthias Rodriguez -- 2/28/19   Nonischemic cardiomyopathy, chronic systolic HF. COMPENSATED  AICD IN SITU   HTN  Multiple CVAs.   A/C WITH XARELTO    ECHO 3/28/19  Summary  Left ventricle is normal in size. Global left ventricular systolic function  is moderately reduced. Estimated ejection fraction is 35 % . The apex and apical segments are hypokinetic. Grade I (mild) left ventricular diastolic dysfunction. Pacemaker / ICD lead seen in right atrium and ventricle. Thickened mitral valve leaflets. Mild mitral regurgitation. Mild tricuspid regurgitation. Estimated right ventricular systolic pressure is 25 mmHg. Stress Test 4/5/19  Technically suboptimal exam with numerous artifacts and GI tract activity.       Large inferior wall infarct with isaak-infarct ischemia.  This extends into   the apical region.       Global hypokinesis most pronounced in the inferior wall.       LVEF 28%. Objective:   Vitals: BP (!) 107/50   Pulse 82   Temp 97.8 °F (36.6 °C)   Resp 21   Ht 5' 4\" (1.626 m)   Wt 108 lb 7.5 oz (49.2 kg)   SpO2 93%   BMI 18.62 kg/m²   General appearance: intubated  HEENT: Head: Normocephalic, no lesions, without obvious abnormality. Neck: no JVD, trachea midline, no adenopathy  Lungs: Clear to auscultation right side, LLL with fine rales and ronchi throughout left side. No distress. On NC oxygen  Heart: Regular rate and rhythm, s1/s2 auscultated, no murmurs.  AV demand paced with PVC  Abdomen: soft, non-tender, bowel sounds active  Extremities: no edema  Neurologic: not done      Patient Active Problem List:     Elevated fasting glucose     Nonischemic cardiomyopathy (HCC)     Hyperlipidemia     Anxiety     History of tobacco abuse     Fibrocystic breast disease     Mild mitral regurgitation     Sick sinus syndrome (HCC)     Automatic implantable cardioverter-defibrillator in situ     Cerebral infarction ([facility] [address]     Superior mesenteric vein thrombosis     Functional urinary incontinence     Expressive aphasia     Stroke-like symptoms     Acute encephalopathy     History of cerebral infarction     History of implantable cardioverter-defibrillator (ICD) placement     Hiatal hernia     Chronic atrial fibrillation (HCC)      Assessment / Acute Cardiac Problems:   1. PAF   2. Non-ischemic Cardiomyopathy with no signs of volume overload. 3. S/P ICD. 4. Multiple CVAs with non-compliance with anticoagulation. 5. HTN. 6. Hyperlipidemia. Plan of Treatment:   1. PAF. xarelto Restarted. Remains off any rate control meds d/t prior hypotension. HR stable. 2. Stress test reviewed by Dr. Criselda Stein. Per recommendations will plan for OP cardiac cath in 6-8weeks after further neurological recovery. Discussed with patient and then RN. Continue PO ASA & Statin. NO BB/ACE d/t hypotension  3. Will sign off. Patient to f/u in [facility] clinic in 2-3 weeks post discharge.      Electronically signed by ANASTACIO Whatley CNP on 4/8/2019 at 10:01 AM  [address]  [phone]

## 2019-04-08 NOTE — PROGRESS NOTES
General  Patient assessed for rehabilitation services?: Yes  Family / Caregiver Present: No  Diagnosis: R MCA CVA  Pain Assessment  Pain Assessment: Faces  Pain Level: 0  Reyna-Baker Pain Rating: No hurt  Vital Signs  Patient Currently in Pain: No   Orientation  Orientation  Overall Orientation Status: Impaired  Orientation Level: Oriented to person;Disoriented to place; Disoriented to time;Disoriented to situation  Objective     upon arrival pt was sleeping. Max encouragement to wake up and all lights turned on to assist pt in staying awake- fair return. Pt engaged in a reaching retrieval act on table top w/ RUE. Verbal/tactile cues given to initiate in reaching/grasping the item on table top- good return. Pt able to reach/retreive 5 items and hand them to writer. Pt's L UE not able to be used functionally. Pt drowsy and verbal cues to stay awake -fair return. Writer ended session d/t pt falling asleep. ADL  Grooming: Setup;Verbal cueing; Increased time to complete;Dependent/Total      Attendance  Participation: Active participation    Plan   Plan  Times per week: 3-4x  Current Treatment Recommendations: Balance Training, Functional Mobility Training, Endurance Training, Patient/Caregiver Education & Training, Self-Care / ADL, Equipment Evaluation, Education, & procurement, Safety Education & Training, Pain Management, Neuromuscular Re-education    Goals  Short term goals  Time Frame for Short term goals: Pt will by discharge   Short term goal 1: demo ADL UB dressing/bathing activity supine/seated with max A and max vc's  Short term goal 2: demo static/dynamic sitting on EOB with mod A for 6 min+  Short term goal 3: demo activity tolerance for 15 min  Short term goal 4: demo supine<>sit transfer to EOB with max Ax1       Therapy Time   Individual Concurrent Group Co-treatment   Time In  9:57         Time Out  10:13         Minutes  MATIAS Blue/HAKAN

## 2019-04-09 NOTE — CONSULTS
Department of Neurology                                          Resident Progress Note    Subjective:      HISTORY OF PRESENT ILLNESS:       The patient is a 81 y.o. female who presented on 03/28/19 with reported new onset left-sided weakness and speech deficits. She has past medical history of bilateral ischemic strokes, unclear baseline neuro status. Past medical history of heart disease including CAD, earlier myopathy status post ICD placement, A. Fib(was on xrelto but discontinued it by herself after a few months), CAD, anxiety, depression, or malacia, history of tobacco abuse, hyperlipidemia, stroke, sick sinus syndrome, superior mesenteric vein thrombosis. CT brain 3/28/19 was negative for any acute infarct. 03/29/19 CT head consistent with acute ischemic stroke right medial frontal, no intracranial hemorrhage. Large areas of encephalomalacia in the bilateral cerebral hemispheres which are similar to the prior study and likely related to old bilateral MCA territory infarct. Chronic small vessel ischemic disease. Small left cortical remote lacunar infarct. CTA head was done that was unremarkable. MRI  brain was not done since her ICD was not compatible WITH  MRI. Home medications :aspirin 81 , XARELTO(was on xrelto but discontinued it by herself after a few months), Lipitor 40. And was continued on aspirin and statins for secondary stroke prophylaxis. Echo with EF 35% and grade 1 diastolic dysfunction. Status post ICD placement. And was also intubated for acute hypoxic respiratory failure. She was started on tube feeds during her stay. Herpetic anticoagulation restarted in 3-4 days from ischemic stroke due to high risk of hemorrhagic conversion in the acute infarct bed.     cardiology was also consulted. And was started on xarelto on  4/1/19.     For her diaphragmatic hernia containing stomach, small ball) daily as general surgery since . Past Surgical History:        Procedure Laterality Date    CARDIAC CATHETERIZATION  01/10/2008    significant cardiomyopathy, ejection fraction 30%, global hypokinesis, sinus bradycardia into the 40s transiently, frequent PVCs, otherwise normal.     CARDIAC DEFIBRILLATOR PLACEMENT  2013    NOT MRI CONDITIONAL-[employer] ICD MODEL [id number] SERIAL [id number] LEAD RA MODEL 452763-04 SERIAL [id number] LEAD RV 837150-94 SERIAL [id number]    GASTROSTOMY TUBE PLACEMENT  2014    attempted peg tube, unsuccessful    HYSTERECTOMY, VAGINAL  1993    total with BSO for benign tumors.  ILEOSTOMY OR JEJUNOSTOMY  2013    jejunostomy    LAPAROSCOPY  1982    with D&C.      STOMACH SURGERY      gastropexy with G-tube holding in place       Social History:   Social History     Socioeconomic History    Marital status:      Spouse name: Not on file    Number of children: Not on file    Years of education: Not on file    Highest education level: Not on file   Occupational History    Not on file   Social Needs    Financial resource strain: Not on file    Food insecurity:     Worry: Not on file     Inability: Not on file    Transportation needs:     Medical: Not on file     Non-medical: Not on file   Tobacco Use    Smoking status: Former Smoker     Packs/day: 0.50     Years: 26.00     Pack years: 13.00     Last attempt to quit: 7/15/2006     Years since quittin.7    Smokeless tobacco: Never Used    Tobacco comment: 1/2 pack a day, started in approximately , quit 2006   Substance and Sexual Activity    Alcohol use: No     Alcohol/week: 0.0 oz    Drug use: No    Sexual activity: Never   Lifestyle    Physical activity:     Days per week: Not on file     Minutes per session: Not on file    Stress: Not on file   Relationships    Social connections:     Talks on phone: Not on file     Gets together: Not on file     Attends [employer] service: Not on file     Active member of club or organization: Not on file     Attends meetings of clubs or organizations: Not on file     Relationship status: Not on file    Intimate partner violence:     Fear of current or ex partner: Not on file     Emotionally abused: Not on file     Physically abused: Not on file     Forced sexual activity: Not on file   Other Topics Concern    Not on file   Social History Narrative    Not on file       Family History:       Problem Relation Age of Onset    Breast Cancer Mother 62    Cancer Mother         pancreatic    Diabetes Mother         \"borderline\"    High Blood Pressure Mother     Mental Illness Mother     Cancer Father         laryngeal    Diabetes Father     Stroke Maternal Grandmother     Diabetes Maternal Grandmother     Stroke Maternal Grandfather     Diabetes Maternal Grandfather     Stomach Cancer Paternal Grandmother     COPD Other        Allergies: Other; Ace inhibitors; Beta adrenergic blockers; and Adhesive tape    Home Medications:  Prior to Admission medications    Medication Sig Start Date End Date Taking? Authorizing Provider   atorvastatin (LIPITOR) 40 MG tablet take 1 tablet by mouth once daily 2/12/19   Jeferson Hollis DO   XARELTO 20 MG TABS tablet take 1 tablet by mouth once daily 10/12/18   Jeferson Hollis DO   donepezil (ARICEPT) 10 MG tablet Take 10 mg by mouth daily    Historical Provider, MD   [address] Take by mouth daily    Historical Provider, MD   meclizine (ANTIVERT) 25 MG tablet Take 1 tablet by mouth 2 times daily as needed for Dizziness 7/2/17   Aly Edmonds MD   aspirin 81 MG tablet Take 1 tablet by mouth daily.  Medication per j tube 9/2/14   Clover Tidwell       Current Medications:   Current Facility-Administered Medications: furosemide (LASIX) tablet 20 mg, 20 mg, Oral, Daily  dextrose 50 % solution 25 g, 25 g, Intravenous, PRN  midodrine (PROAMATINE) tablet 5 mg, 5 mg, Oral, TID PRN  magic (miracle) mouthwash, 5 mL, Swish & Spit, 4x Daily PRN  sodium chloride flush 0.9 % injection 10 mL, 10 mL, Intravenous, PRN  sodium chloride flush 0.9 % injection 10 mL, 10 mL, Intravenous, PRN  diphenoxylate-atropine (LOMOTIL) liquid 5 mL, 5 mL, Oral, 4x Daily PRN  potassium chloride 10 mEq/100 mL IVPB (Peripheral Line), 10 mEq, Intravenous, PRN  fentaNYL (SUBLIMAZE) injection 25 mcg, 25 mcg, Intravenous, Q1H PRN  [Held by provider] rivaroxaban (XARELTO) tablet 20 mg, 20 mg, Oral, Daily  senna (SENOKOT) tablet 8.6 mg, 1 tablet, Oral, Nightly  aspirin chewable tablet 81 mg, 81 mg, Oral, Daily  atorvastatin (LIPITOR) tablet 40 mg, 40 mg, Oral, Nightly  sodium chloride flush 0.9 % injection 10 mL, 10 mL, Intravenous, 2 times per day  sodium chloride flush 0.9 % injection 10 mL, 10 mL, Intravenous, PRN  magnesium hydroxide (MILK OF MAGNESIA) 400 MG/5ML suspension 30 mL, 30 mL, Oral, Daily PRN  ondansetron (ZOFRAN) injection 4 mg, 4 mg, Intravenous, Q6H PRN  acetaminophen (TYLENOL) suppository 650 mg, 650 mg, Rectal, Q4H PRN  hydrALAZINE (APRESOLINE) injection 5 mg, 5 mg, Intravenous, Q6H PRN  lidocaine (XYLOCAINE) 2% uro-jet, , Topical, Once    REVIEW OF SYSTEMS:       CONSTITUTIONAL: negative for fatigue and malaise   EYES: negative for double vision and photophobia    HEENT: negative for tinnitus and sore throat   RESPIRATORY: negative for cough, shortness of breath   CARDIOVASCULAR: negative for chest pain, palpitations   GASTROINTESTINAL: negative for nausea, vomiting   GENITOURINARY: negative for incontinence   MUSCULOSKELETAL: negative for neck or back pain   NEUROLOGICAL: negative for seizures   PSYCHIATRIC: negative for fatigue     Review of systems otherwise negative.     PHYSICAL EXAM:       /78   Pulse 72   Temp 97.3 °F (36.3 °C) (Axillary)   Resp 16   Ht 5' 4\" (1.626 m)   Wt 108 lb 7.5 oz (49.2 kg)   SpO2 100%   BMI 18.62 kg/m²     CONSTITUTIONAL:  Well developed, well nourished, alert and visual loss  4. Facial Palsy:  0 - normal symmetric movement  5a. Motor left arm:  4 - no movement  5b. Motor right arm:  2 - some effort against gravity, limb cannot get to or maintain (if cued) 90 (or 45) degrees, drifts down to bed, but has some effort against gravity   6a. Motor left le - no movement  6b. Motor right le - no movement  7. Limb Ataxia:  Unable to assess  8. Sensory:  1 - mild to moderate sensory loss; patient feels pinprick is less sharp or is dull on the affected side; there is a loss of superficial pain with pinprick but patient is aware of being touched   9. Best Language:  3 - mute, global aphasia; no usable speech or auditory comprehension  10. Dysarthria:  2 - severe; patient speech is so slurred as to be unintelligible in the absence of or our of proportion to any dysphagia, or is mute/anarthric   11.   Extinction and Inattention:  0 - no abnormality    TOTAL:  22     SKIN:  no rash      LABS AND IMAGING:     CBC with Differential:    Lab Results   Component Value Date    WBC 10.3 2019    RBC 4.21 2019    HGB 13.0 2019    HCT 41.7 2019     2019    MCV 99.0 2019    MCH 30.9 2019    MCHC 31.2 2019    RDW 13.7 2019    LYMPHOPCT 14 2019    LYMPHOPCT 3.7 2019    MONOPCT 8 2019    MONOPCT 2.6 2019    EOSPCT 0.2 2019    BASOPCT 0 2019    BASOPCT 0.8 2019    MONOSABS 0.78 2019    MONOSABS 0.3 2019    LYMPHSABS 1.44 2019    LYMPHSABS 0.5 2019    EOSABS 0.11 2019    EOSABS 0.0 2019    BASOSABS 0.03 2019    DIFFTYPE NOT REPORTED 2019     BMP:    Lab Results   Component Value Date     2019    K 4.5 2019     2019    CO2 26 2019    BUN 18 2019    LABALBU 3.3 2019    LABALBU 4.1 2019    CREATININE 0.62 2019    CALCIUM 9.0 2019    GFRAA >60 2019    LABGLOM >60 04/09/2019    GLUCOSE 111 04/09/2019    GLUCOSE 97 03/27/2019       Radiology Review:  As above      ASSESSMENT AND PLAN:       Patient Active Problem List   Diagnosis    Elevated fasting glucose    Nonischemic cardiomyopathy (HCC)    Hyperlipidemia    Anxiety    History of tobacco abuse    Fibrocystic breast disease    Mild mitral regurgitation    Sick sinus syndrome (HCC)    Automatic implantable cardioverter-defibrillator in situ    Cerebral infarction ([address]    Superior mesenteric vein thrombosis    Functional urinary incontinence    Expressive aphasia    Stroke-like symptoms    Acute encephalopathy    History of cerebral infarction    History of implantable cardioverter-defibrillator (ICD) placement    Hiatal hernia    Chronic atrial fibrillation (HCC)    Acute ischemic stroke ([address]    Aspiration pneumonia of both upper lobes (HCC)    Moderate malnutrition ([address]    Multi infarct state    Left hemiparesis ([address]    Aphasia       81 y.o. female who presents with AMS, left-sided weakness, secondary  to A. fib. ASA has been stopped due to increased bleeding risk, Lipitor 40 mg, continue xarelto 20 mg daily.            -Gen. surgery consultation for open J-tube with diaphragmatic hernia having failed swallow study. Gen. surgery needs clearance  for risk stratification. Per general surgery feeding tube placement continues to be indicated on Thursday will proceed with laparoscopic J-tube and G-tube placement possible open.             -Echo with EF 35%, apical hypokinesis, grade 1 diastolic dysfunction, mild MR and TR.   - Folic acid 1mg BID   - LDL 40              -a1c pending   - PT, OT, Speech eval               -failed swallow              -Has NG tube on tube feedings.               -aspiration pneumonia-management per primary   - Hydrate with  IVF NS @ 75cc/hr    - Telemetry    - Neuro checks per protocol  - We recommend SBP normotension  - Blood

## 2019-04-09 NOTE — PROGRESS NOTES
Physical Therapy  Facility/Department: [facility] NEURO  Daily Treatment Note  NAME: Gonzales Romano  : 1950  MRN: [id number]    Date of Service: 2019    Discharge Recommendations:    Further therapy recommended at discharge. PT Equipment Recommendations  Equipment Needed: No    Patient Diagnosis(es): The encounter diagnosis was Stroke-like symptoms. 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   has a past surgical history that includes laparoscopy (1982); Hysterectomy, vaginal (1993); Cardiac defibrillator placement (2013); Cardiac catheterization (01/10/2008); Gastrostomy tube placement (2014); ileostomy or jejunostomy (2013); and Stomach surgery (). Restrictions  Restrictions/Precautions  Restrictions/Precautions: Fall Risk, General Precautions  Required Braces or Orthoses?: No  Position Activity Restriction  Other position/activity restrictions: L side deficits, non-verbal at baseline, up with A  Subjective   General  Response To Previous Treatment: Patient with no complaints from previous session. Family / Caregiver Present: No  Subjective  Subjective: Rn and pt agreeable to PT.  Resting in bed upon arrival, nods head yes to PT  Pain Screening  Patient Currently in Pain: No  Vital Signs  Patient Currently in Pain: No       Orientation  Orientation  Overall Orientation Status: Impaired  Orientation Level: Unable to assess(non verbal)  Cognition      Objective   Bed mobility  Rolling to Left: Maximum assistance;2 Person assistance  Rolling to Right: Maximum assistance;2 Person assistance  Supine to Sit: Maximum assistance;2 Person assistance  Sit to Supine: Maximum assistance;2 Person assistance  Transfers  Sit to Stand: Unable to assess(Not appropriate)  Ambulation  Ambulation?: No     Balance  Sitting - Static: Poor  Sitting - Dynamic: Poor(Pt sat EOB ~12 min, requiring MOD A to maintain due to heavy post lean)    Exercise  B LE PROM/AAROM in all planes x 15 reps  L UE PROM in all planes x 10                       Assessment   Body structures, Functions, Activity limitations: Decreased functional mobility ; Decreased endurance;Decreased ROM; Decreased strength;Decreased safe awareness;Decreased balance;Decreased ADL status  Prognosis: Fair  REQUIRES PT FOLLOW UP: Yes  Activity Tolerance  Activity Tolerance: Patient Tolerated treatment well        Goals  Short term goals  Time Frame for Short term goals: 14 visits  Short term goal 1: Perform bed mobility with Mod A  Short term goal 2: Perform sit to stand transfer with Max A  Short term goal 3: Demo Poor+ dynamic standing balance with appropriate AD  Short term goal 4: Ambulate 30ft with green lift walker and Mod A  Short term goal 5: Participate in 30 minutes of therapy to demo increased endurance    Plan    Plan  Times per week: 5-6x/wk  Current Treatment Recommendations: Strengthening, Balance Training, Functional Mobility Training, Transfer Training, Endurance Training, Patient/Caregiver Education & Training, Safety Education & Training, Home Exercise Program, ROM, Neuromuscular Re-education, Gait Training  Plan Comment: attempt rich fernandez if appropriate  Safety Devices  Type of devices: Call light within reach, Nurse notified, Left in bed, Bed alarm in place  Restraints  Initially in place: No(Neuro mitts)     Therapy Time   Individual Concurrent Group Co-treatment   Time In 1054         Time Out 1121         Minutes 27 Tracie Bliss, PTA

## 2019-04-09 NOTE — PLAN OF CARE
Problem: OXYGENATION/RESPIRATORY FUNCTION  Goal: Patient will maintain patent airway  4/9/2019 6079 by Angel Graf RCP  Outcome: Ongoing     Problem: OXYGENATION/RESPIRATORY FUNCTION  Goal: Patient will achieve/maintain normal respiratory rate/effort  Description  Respiratory rate and effort will be within normal limits for the patient  4/9/2019 9218 by Angel Graf RCP  Outcome: Ongoing

## 2019-04-09 NOTE — PROGRESS NOTES
Speech Language Pathology  Speech Language Pathology  [facility]    Speech Language Treatment Note    Date: 4/9/2019  Patients Name: Carli Kim  MRN: [id number]  Diagnosis:   Patient Active Problem List   Diagnosis Code    Elevated fasting glucose R73.01    Nonischemic cardiomyopathy (HCC) I42.8    Hyperlipidemia E78.5    Anxiety F41.9    History of tobacco abuse Z87.891    Fibrocystic breast disease N60.19    Mild mitral regurgitation I34.0    Sick sinus syndrome ([address] I49.5    Automatic implantable cardioverter-defibrillator in situ Z95.810    Cerebral infarction ([address] I63.9    Superior mesenteric vein thrombosis I81    Functional urinary incontinence R39.81    Expressive aphasia R47.01    Stroke-like symptoms R29.90    Acute encephalopathy G93.40    History of cerebral infarction Z86.73    History of implantable cardioverter-defibrillator (ICD) placement Z95.810    Hiatal hernia K44.9    Chronic atrial fibrillation (HCC) I48.2    Acute ischemic stroke ([address] I63.9    Aspiration pneumonia of both upper lobes (HCC) J69.0    Moderate malnutrition ([address] E44.0    Multi infarct state I69.30    Left hemiparesis ([address] G81.94    Aphasia R47.01       Pain: 0/10    Speech and Language Treatment  Treatment time: 1497-8310    Subjective: [x] Alert [x] Cooperative     [] Confused     [] Agitated    [] Lethargic    Objective/Assessment:  Auditory Comprehension: 1 unit commands: 5/6 did not increased despite max verbal cues. Verbal Expression: Biographical information/orientation: Pt. Provided no verbal responses despite multiple verbal cues. Automatic Speech: Pt. Provided no verbal responses despite multiple verbal cues. Yes/No questions: Pt. Provided no verbal responses despite multiple verbal cues. **Pt. Laughing intermittently throughout. No other verbalizations noted throughout session despite max cues. Further therapy recommended at discharge. The patient should be able to tolerate at least three hours of therapy per day over 5 days or 15 hours over 7 days. Plan:  [x] Continue ST services    [] Discharge from ST:      Discharge recommendations: [] Inpatient Rehab   [] [place]   [] Outpatient Therapy  [] Follow up at trauma clinic   [x] Other:  Therapy    Treatment completed by: Completed by Wang Pinzon,  Clinician  Co-signed by:  Adia Alexander M.S., CCC/SLP

## 2019-04-09 NOTE — PROGRESS NOTES
(Careplan) Pt continues to be free of harm, mitt still needed d/t pulling lines and drains. Will continue to monitor.  Jose Carlos Lundberg RN

## 2019-04-09 NOTE — PROGRESS NOTES
Lorraine Buchanan 21    Progress Note    4/9/2019    5:55 PM    Name:   Travis Turcios  MRN:     [id number]     Acct:      [de-identified]   Room:   [address] [place] [address] Day:  12  Admit Date:  3/28/2019  1:05 AM    PCP:   Julio C Pizarro DO  Code Status:  Full Code    Subjective:     C/C:   Chief Complaint   Patient presents with    Cerebrovascular Accident     Interval History Status: improved. Patient seen and examined at bedside, no acute events overnight. K elevated  Calm, no agitation, follows commands, had stress test yesterday with asked inferior wall defect with suspicious  reversible inferior wall ischemia   Patient vitals, labs and all providers notes were reviewed,from overnight shift and morning updates were noted and discussed with the nurse    Brief History:      Per chart  The patient is a 81 y.o. Non-/non  female who presents with Cerebrovascular Accident   and she is admitted to the hospital for the management of  CVA  Per chart  \" Initial Presentation (Admitted 3/28): The patient is J 76 y.o. female presented with presents with concern for CVA.  Transfer from [facility] ED, presented with NIH 21.  Last known well 1200 3/27/18.  Previous records indicate pt found by family w/ AMS, aphasia, Left sided deficits.  PMH of previous CVA, afib as on outpatient Xarelto but pt discontinued a few months.  Previous dysarthria, no reported residual weakness.   [facility] Course:   3/29: Intubated overnight for impending respiratory Failure. Copious secreations, remains on zosyn, zithromax.   3/30: Started on norepinephrine drip. Troponins are trending down.   3/31: remained on pressors, switch to dobutamine from levophed   4/1:   No acute events overnight.  Levophed switched to dobutamine yesterday, titrated up to 5 mcgs overnight for goal systolic blood pressure greater than 100.  Patient remains intubated, sedated.  Patient seen and evaluated at bedside this morning, following simple commands right upper and lower extremity.  Overall patient's condition slowly improving.     4/2:  No acute overnight events, more responsive and follows commnads, MAP above 70 as per a-line measurements, heart rhythm showed a.fib on dobtumaine running at 6 jan, afebrile, hbg 10.4 on 4/1 today 9.6, mg 1.8 and potassium 3.7 , replacement ordered to keep mg above 2 and potassium above 4. Cardiology on board for pvc,had BW today. Patient extubated  today doing well on Nc, will c/w zoysn for another day Zithromax d/c and midodrine increased to 10 mg and to wean off dobutimine  4/3:  Patient desaturate to 91 % was placed on a non breather and later on bipap and saturation improved. however cxr showed worsening opacities R>L,  therefore chest Pt started zithromax resumed and lasix 20 mg x1 given, off dobutamine and doing well neurologically. On tube feeds , swallow eval ordered didn't pass video ordered     Upon my  evaluation patient is nonverbal but quite and follow commands, Lt sided weakness,  vitals are stable, no family at bedside  Tube feeds currently on hold for a stress test scheduled today  Patient had a white medicine on her right hand but doesn't seem aggressive or agitated  Phosphorous is low           Review of Systems:     Unable to obtain as the patient is aphasic, she shakes her head no when questioned about pain    Medications: Allergies: Allergies   Allergen Reactions    Other Shortness Of Breath     Sawdust.       Ace Inhibitors Other (See Comments)     Hypotension, dizziness.  Beta Adrenergic Blockers Other (See Comments)     Hypotension, dizziness.      Adhesive Tape Rash       Current Meds:   Scheduled Meds:    furosemide  20 mg Oral Daily    [Held by provider] rivaroxaban  20 mg Oral Daily    senna  1 tablet Oral Nightly    aspirin  81 mg Oral Daily    atorvastatin  40 mg Oral Nightly    sodium chloride flush  10 mL Intravenous 2 times per day    lidocaine   Topical Once     Continuous Infusions:    heparin (porcine) 12 Units/kg/hr (19 0336)     PRN Meds: dextrose, midodrine, magic (miracle) mouthwash, sodium chloride flush, sodium chloride flush, diphenoxylate-atropine, potassium chloride, fentanNYL, sodium chloride flush, magnesium hydroxide, ondansetron, acetaminophen, hydrALAZINE    Data:     Past Medical History:   has a past medical history of Anxiety, Blurred vision, bilateral, Bradycardia, Chronic renal insufficiency, Constipation, Depression, Elevated fasting glucose, Encephalomalacia, Fibrocystic breast disease, Frontal headache, Genital atrophy of female, Grief reaction, Hiatal hernia, History of tobacco abuse, Hyperlipidemia, Ileus ([address] Mild mitral regurgitation, Nonischemic cardiomyopathy ([address] Overweight, Paraesophageal hiatal hernia, Sick sinus syndrome ([address] Stroke ([address] Superior mesenteric vein thrombosis, and Tinnitus of right ear. Social History:   reports that she quit smoking about 12 years ago. She has a 13.00 pack-year smoking history. She has never used smokeless tobacco. She reports that she does not drink alcohol or use drugs.      Family History:   Family History   Problem Relation Age of Onset    Breast Cancer Mother 62    Cancer Mother         pancreatic    Diabetes Mother         \"borderline\"    High Blood Pressure Mother     Mental Illness Mother     Cancer Father         laryngeal    Diabetes Father     Stroke Maternal Grandmother     Diabetes Maternal Grandmother     Stroke Maternal Grandfather     Diabetes Maternal Grandfather     Stomach Cancer Paternal Grandmother     COPD Other        Vitals:  BP (!) 101/48   Pulse 87   Temp 96.1 °F (35.6 °C) (Axillary)   Resp 20   Ht 5' 4\" (1.626 m)   Wt 108 lb 7.5 oz (49.2 kg)   SpO2 96%   BMI 18.62 kg/m²   Temp (24hrs), Av.2 °F (36.2 °C), Min:96.1 °F (35.6 °C), Max:98.2 °F (36.8 °C)    No results for input(s): POCGLU in the last 72 hours. I/O (24Hr): Intake/Output Summary (Last 24 hours) at 4/9/2019 1755  Last data filed at 4/9/2019 1600  Gross per 24 hour   Intake 100 ml   Output 700 ml   Net -600 ml       Labs:    Hematology:  Recent Labs     04/08/19  0809 04/09/19  0509 04/09/19  1659   WBC 4.4 10.3 8.9   RBC 4.55 4.21 4.23   HGB 14.1 13.0 13.0   HCT 42.8 41.7 41.6   MCV 94.1 99.0 98.3   MCH 31.0 30.9 30.7   MCHC 32.9 31.2 31.3   RDW 13.6 13.7 13.6   PLT See Reflexed IPF Result 240 245   MPV NOT REPORTED 10.7 10.7     Chemistry:  Recent Labs     04/07/19  0521  04/08/19  0809 04/08/19  1646 04/09/19  0509     --  134*  --  137   K 3.5*   < > 5.6* 4.7 4.5   CL 99  --  98  --  101   CO2 31  --  29  --  26   GLUCOSE 131*  --  118*  --  111*   BUN 19  --  19  --  18   CREATININE 0.67  --  0.55  --  0.62   MG 2.1  --  2.2  --  2.3   ANIONGAP 9  --  7*  --  10   LABGLOM >60  --  >60  --  >60   GFRAA >60  --  >60  --  >60   CALCIUM 8.6  --  8.9  --  9.0   PHOS 2.7  --  3.3  --  3.7    < > = values in this interval not displayed. No results for input(s): PROT, LABALBU, LABA1C, I2JTQBS, B8JIPDN, FT4, TSH, AST, ALT, LDH, GGT, ALKPHOS, LABGGT, BILITOT, BILIDIR, AMMONIA, AMYLASE, LIPASE, LACTATE, CHOL, HDL, LDLCHOLESTEROL, CHOLHDLRATIO, TRIG, VLDL, LBL20HH, PHENYTOIN, PHENYF, URICACID, POCGLU in the last 72 hours.       Lab Results   Component Value Date/Time    SPECIAL NOT REPORTED 03/29/2019 03:54 AM     Lab Results   Component Value Date/Time    CULTURE NORMAL RESPIRATORY SHEILA  LIGHT GROWTH   03/29/2019 03:54 AM       Lab Results   Component Value Date    POCPH 7.442 04/02/2019    POCPCO2 38.5 04/02/2019    POCPO2 79.0 04/02/2019    POCHCO3 26.3 04/02/2019    NBEA NOT REPORTED 04/02/2019    PBEA 2 04/02/2019    IAK6UNR 27 04/02/2019    JKRL1XBE 96 04/02/2019    FIO2 30.0 04/02/2019       Radiology:        Physical Examination:        General Appearance:  alert, well appearing, and in no acute distress  Mental status: Unable to assess  Head:  normocephalic, atraumatic. Eye: no icterus, redness, pupils equal and reactive, extraocular eye movements intact, conjunctiva clear  Ear: normal external ear, no discharge, hearing intact  Nose:  no drainage noted, NGT  Mouth: mucous membranes moist  Neck: supple, no carotid bruits, thyroid not palpable  Lungs: bibasilar crakles,  normal effort  Cardiovascular: normal rate, regular rhythm, no murmur, gallop, rub.   Abdomen: Soft, nontender, nondistended, normal bowel sounds, no hepatomegaly or splenomegaly  Neurologic: muscle strength 1/5 on L side, 4/5 on the right side  normal muscle tone and bulk, aphasia, cant fully assess  Skin: No gross lesions, rashes, bruising or bleeding on exposed skin area  Extremities:  peripheral pulses palpable, no pedal edema or calf pain with palpation      Assessment:        Primary Problem  Acute ischemic stroke [facility])    Active Hospital Problems    Diagnosis Date Noted    Acute ischemic stroke [facility]) [I63.9]      Priority: High    Multi infarct state [I69.30] 04/08/2019    Left hemiparesis ([address] [G81.94] 04/08/2019    Aphasia [R47.01] 04/08/2019    Moderate malnutrition ([address] [E44.0] 04/03/2019    Aspiration pneumonia of both upper lobes ([address] [J69.0]     Stroke-like symptoms [R29.90] 03/28/2019    Acute encephalopathy [G93.40]     History of cerebral infarction [Z86.73]     History of implantable cardioverter-defibrillator (ICD) placement [Z95.810]     Hiatal hernia [K44.9]     Chronic atrial fibrillation ([address] [I48.2]        Plan:        -Stroke most likely emboli: Xarelto resumed ( currently on hold for the G tube placement)   On heparin drip  Continue aspirin and statin     - Elevated trops: Stress test results reviewed,  Inf wall reversible ischemia, need LHC when more stable    - Nonischemic cardiomyopathy  S/P ICD: continue  chronic meds, Not on BB 2/2 her BP    - Dysphagia:   Gen. surgery consulted for G-tube placement, patient is a high-risk, will need cardiology clearance  continue tube feeds     Check electrolytes and replace as needed      -DVT & GI PPx      - PT/OT/Placement    Westley Angeles MD  4/9/2019  5:55 PM

## 2019-04-09 NOTE — PROGRESS NOTES
Cardiology consult needed for clearance for surgery on Thursday.  Contacted [facility], spoke with Hemanth Maya, noted she would add pt to list. .Trena Lorenz RN

## 2019-04-10 NOTE — PROGRESS NOTES
above 70 as per a-line measurements, heart rhythm showed a.fib on dobtumaine running at 6 jan, afebrile, hbg 10.4 on 4/1 today 9.6, mg 1.8 and potassium 3.7 , replacement ordered to keep mg above 2 and potassium above 4. Cardiology on board for pvc,had BW today. Patient extubated  today doing well on Nc, will c/w zoysn for another day Zithromax d/c and midodrine increased to 10 mg and to wean off dobutimine  4/3:  Patient desaturate to 91 % was placed on a non breather and later on bipap and saturation improved. however cxr showed worsening opacities R>L,  therefore chest Pt started zithromax resumed and lasix 20 mg x1 given, off dobutamine and doing well neurologically. On tube feeds , swallow eval ordered didn't pass video ordered     After coming out of ICU to general floor, stroke work up with Echo showed low EF. She has stress test done by cardiology with recommendation for possible cardiac cath as outpatient. She also has persistent oropharyngeal dysphasia needing planned G or J tube placement    Review of Systems:     Per staff overnight  No reported fevers, chills   No episodes of vomiting, diarrhea reported  No skin rashes reported    Medications: Allergies: Allergies   Allergen Reactions    Other Shortness Of Breath     Sawdust.       Ace Inhibitors Other (See Comments)     Hypotension, dizziness.  Beta Adrenergic Blockers Other (See Comments)     Hypotension, dizziness.      Adhesive Tape Rash       Current Meds:   Scheduled Meds:    furosemide  20 mg Oral Daily    [Held by provider] rivaroxaban  20 mg Oral Daily    senna  1 tablet Oral Nightly    aspirin  81 mg Oral Daily    atorvastatin  40 mg Oral Nightly    sodium chloride flush  10 mL Intravenous 2 times per day    lidocaine   Topical Once     Continuous Infusions:    heparin (porcine) 20 Units/kg/hr (04/10/19 1046)     PRN Meds: dextrose, midodrine, magic (miracle) mouthwash, sodium chloride flush, sodium chloride flush, Labs:    Hematology:  Recent Labs     04/09/19  0509 04/09/19  1659 04/10/19  0402   WBC 10.3 8.9 9.9   RBC 4.21 4.23 4.07   HGB 13.0 13.0 12.7   HCT 41.7 41.6 39.7   MCV 99.0 98.3 97.5   MCH 30.9 30.7 31.2   MCHC 31.2 31.3 32.0   RDW 13.7 13.6 13.8    245 257   MPV 10.7 10.7 10.8     Chemistry:  Recent Labs     04/08/19  0809 04/08/19  1646 04/09/19  0509 04/10/19  0402   *  --  137 136   K 5.6* 4.7 4.5 4.1   CL 98  --  101 99   CO2 29  --  26 28   GLUCOSE 118*  --  111* 139*   BUN 19  --  18 25*   CREATININE 0.55  --  0.62 0.69   MG 2.2  --  2.3 2.4   ANIONGAP 7*  --  10 9   LABGLOM >60  --  >60 >60   GFRAA >60  --  >60 >60   CALCIUM 8.9  --  9.0 9.0   PHOS 3.3  --  3.7 4.0     Recent Labs     04/10/19  0402   PROT 6.4   LABALBU 3.4*   AST 41*   ALT 36*   ALKPHOS 50   BILITOT 0.95     Lab Results   Component Value Date/Time    SPECIAL NOT REPORTED 03/29/2019 03:54 AM     Lab Results   Component Value Date/Time    CULTURE NORMAL RESPIRATORY SHEILA  LIGHT GROWTH   03/29/2019 03:54 AM     Lab Results   Component Value Date    POCPH 7.442 04/02/2019    POCPCO2 38.5 04/02/2019    POCPO2 79.0 04/02/2019    POCHCO3 26.3 04/02/2019    NBEA NOT REPORTED 04/02/2019    PBEA 2 04/02/2019    VRL7ASQ 27 04/02/2019    ZHPX9AKP 96 04/02/2019    FIO2 30.0 04/02/2019     Physical Examination:        General Appearance:  alert, well appearing, aphasic, seems confused   Mental status: seems confused, able to follow some motor commands though  Head:  normocephalic, atraumatic. Eye: no icterus, redness, pupils equal and reactive, extraocular eye movements intact, conjunctiva clear  Ear: normal external ear, no discharge, hearing intact  Nose:  no drainage noted, NGT  Mouth: mucous membranes moist  Neck: supple, no carotid bruits, thyroid not palpable  Lungs: bibasilar crakles, decreased effort   Cardiovascular: normal rate, regular rhythm, no murmur, gallop, rub.   Abdomen: Soft, nontender, nondistended, normal bowel sounds, no hepatomegaly or splenomegaly  Neurologic: muscle strength 1/5 on L side, 4/5 on the right side  normal muscle tone and bulk, aphasia, cant fully assess  Skin: No gross lesions, rashes, bruising or bleeding on exposed skin area  Extremities:  peripheral pulses palpable, no pedal edema or calf pain with palpation    Assessment:        Primary Problem  Acute ischemic stroke [facility])    Active Hospital Problems    Diagnosis Date Noted    Dysphagia [R13.10]     Hyperkalemia [E87.5]     Multi infarct state [I69.30] 04/08/2019    Left hemiparesis ([address] [G81.94] 04/08/2019    Aphasia [R47.01] 04/08/2019    Moderate malnutrition ([address] [E44.0] 04/03/2019    Acute ischemic stroke ([address] [I63.9]     Aspiration pneumonia of both upper lobes [address] [J69.0]     Stroke-like symptoms [R29.90] 03/28/2019    Acute encephalopathy [G93.40]     History of cerebral infarction [Z86.73]     History of implantable cardioverter-defibrillator (ICD) placement [Z95.810]     Hiatal hernia [K44.9]     Chronic atrial fibrillation ([address] [I48.2]      Plan:        -Stroke most likely emboli: Xarelto resumed ( currently on hold for the G tube placement). On heparin drip     - Elevated trops: Stress test results reviewed,  Inf wall reversible ischemia, need LHC when more stable    - Nonischemic cardiomyopathy  S/P ICD: continue chronic meds, Not on BB 2/2 her BP    - Dysphagia:   Gen. surgery consulted for G-tube placement, patient is a high-risk, cardiology cleared her with moderate to high risk for procedure    - Continued to have difficulty with clearing secretions.  There is a risk for re intubation with G tube procedure, this was discussed with patient at bedside, who agreed for procedure      Krysten Wood MD  4/10/2019  11:08 AM

## 2019-04-10 NOTE — PLAN OF CARE
Problem: HEMODYNAMIC STATUS  Goal: Patient has stable vital signs and fluid balance  Outcome: Met This Shift  Note:   Neuro assessment completed, fall precautions in place, aspirations precautions in place, assess for barriers in communication and mobility, interventions to assist in communication and mobility in place, encouraged to call for assistance, adaptive devices used as needed, assess emotional state and support offered, encouraged patient to communicate by available means, and support systems included in patient care. Problem: Risk for Impaired Skin Integrity  Goal: Tissue integrity - skin and mucous membranes  Description  Structural intactness and normal physiological function of skin and  mucous membranes. Outcome: Met This Shift  Note:   Skin integrity improved/maintained this shift. Janes Score order set followed as needed. See head to toe assessment. Problem: Falls - Risk of:  Goal: Will remain free from falls  Description  Will remain free from falls  Outcome: Met This Shift  Note:   Patient free from falls this shift. Bed in lowest/locked position. 2/4 side rails up. Non-slip socks on. Patient educated to call out for assistance prior to ambulation. Problem: Falls - Risk of:  Goal: Absence of physical injury  Description  Absence of physical injury  Outcome: Met This Shift  Note:   Patient has remained free from accidental/intentional injury this shift. Problem: Restraint Use - Nonviolent/Non-Self-Destructive Behavior:  Goal: Absence of restraint-related injury  Description  Absence of restraint-related injury   4/10/2019 0428 by Vi Blandon RN  Outcome: Met This Shift  Note:   Patient remains free from restraint injury.      Problem: Restraint Use - Nonviolent/Non-Self-Destructive Behavior:  Goal: Absence of restraint indications  Description  Absence of restraint indications   4/10/2019 0428 by Vi Blandon RN  Outcome: Not Met This Shift  Note:   Patient still requires restraints to be used. Patient still attempting to to pull tubes and lines.

## 2019-04-10 NOTE — PLAN OF CARE
NONINVASIVE VENTILATION    PROVIDE OPTIMAL VENTILATION/ACCEPTABLE SPO2   IMPLEMENT NONINVASIVE VENTILATION PROTOCOL   MAINTAIN ACCEPTABLE SPO2   ASSESS SKIN INTEGRITY/BREAKDOWN SCORE   PATIENT EDUCATION AS NEEDED   BIPAP AS NEEDED        BRONCHOSPASM/BRONCHOCONSTRICTION     [x]         IMPROVE AERATION/BREATH SOUNDS  [x]   ADMINISTER BRONCHODILATOR THERAPY AS APPROPRIATE  [x]   ASSESS BREATH SOUNDS  []   IMPLEMENT AEROSOL/MDI PROTOCOL  [x]   PATIENT EDUCATION AS NEEDED

## 2019-04-10 NOTE — PROGRESS NOTES
Reviewed case and patient data with Dr. Paulina Spring. Echo, stress test, and prior office note discussed. Patient will bed moderate to high surgical risk based on echo results. Does not need a cardiac cath prior.     Thank you,  Wolfgang CHAVES/NP-C  4/10/2019 9:11 AM

## 2019-04-10 NOTE — ANESTHESIA PRE PROCEDURE
mL  10 mL Intravenous PRN Severo Govern, APRN - CNP   10 mL at 04/05/19 1215    diphenoxylate-atropine (LOMOTIL) liquid 5 mL  5 mL Oral 4x Daily PRN Linnette Malave MD   5 mL at 04/08/19 2059    potassium chloride 10 mEq/100 mL IVPB (Peripheral Line)  10 mEq Intravenous PRN Margarita Gosselin,  mL/hr at 04/07/19 1604 10 mEq at 04/07/19 1604    fentaNYL (SUBLIMAZE) injection 25 mcg  25 mcg Intravenous Q1H PRN Justine Seaman, DO   25 mcg at 04/02/19 0534    senna (SENOKOT) tablet 8.6 mg  1 tablet Oral Nightly Ayesha Wiggins MD   8.6 mg at 04/09/19 2034    aspirin chewable tablet 81 mg  81 mg Oral Daily Justine Seaman, DO   81 mg at 04/10/19 1660    atorvastatin (LIPITOR) tablet 40 mg  40 mg Oral Nightly Justine Seaman, DO   40 mg at 04/09/19 2034    sodium chloride flush 0.9 % injection 10 mL  10 mL Intravenous 2 times per day Little Hunt MD   10 mL at 04/09/19 1038    sodium chloride flush 0.9 % injection 10 mL  10 mL Intravenous PRN Little Hunt MD        magnesium hydroxide (MILK OF MAGNESIA) 400 MG/5ML suspension 30 mL  30 mL Oral Daily PRN Little Hunt MD        [facility]ron [facility]) injection 4 mg  4 mg Intravenous Q6H PRN Little Hunt MD        acetaminophen (TYLENOL) suppository 650 mg  650 mg Rectal Q4H PRN Little Hunt MD        hydrALAZINE (APRESOLINE) injection 5 mg  5 mg Intravenous Q6H PRN Little Hunt MD        lidocaine (XYLOCAINE) 2% uro-jet   Topical Once Austin Reyes MD           Allergies: Allergies   Allergen Reactions    Other Shortness Of Breath     Sawdust.       Ace Inhibitors Other (See Comments)     Hypotension, dizziness.  Beta Adrenergic Blockers Other (See Comments)     Hypotension, dizziness.      Adhesive Tape Rash       Problem List:    Patient Active Problem List   Diagnosis Code    Elevated fasting glucose R73.01    Nonischemic cardiomyopathy (HCC) I42.8    Hyperlipidemia E78.5    Anxiety F41.9    History of tobacco abuse Z87.891    Fibrocystic breast disease N60.19    Mild mitral regurgitation I34.0    Sick sinus syndrome (HCC) I49.5    Automatic implantable cardioverter-defibrillator in situ Z95.810    Cerebral infarction ([address] I63.9    Superior mesenteric vein thrombosis I81    Functional urinary incontinence R39.81    Expressive aphasia R47.01    Stroke-like symptoms R29.90    Acute encephalopathy G93.40    History of cerebral infarction Z86.73    History of implantable cardioverter-defibrillator (ICD) placement Z95.810    Hiatal hernia K44.9    Chronic atrial fibrillation (HCC) I48.2    Acute ischemic stroke (HCC) I63.9    Aspiration pneumonia of both upper lobes (HCC) J69.0    Moderate malnutrition (HCC) E44.0    Multi infarct state I69.30    Left hemiparesis ([address] G81.94    Aphasia R47.01    Dysphagia R13.10    Hyperkalemia E87.5       Past Medical History:        Diagnosis Date    Anxiety     Blurred vision, bilateral     mild.  Bradycardia     intolerant of beta blockers, intolerant of ACE inhibitors.  Chronic renal insufficiency     Constipation     Depression     Elevated fasting glucose     Encephalomalacia     parietal.     Fibrocystic breast disease     Frontal headache     bilateral, mild, transient.  Genital atrophy of female     Grief reaction     death of mother.  Hiatal hernia     extremely large.  History of tobacco abuse     now quit.  Hyperlipidemia     Ileus (HCC)     Mild mitral regurgitation     Nonischemic cardiomyopathy (HCC)     ejection fraction less than 30%, implantation of ICD.  Overweight     Paraesophageal hiatal hernia     Sick sinus syndrome ([address]     Stroke ([address] 07/19/2006    acute, history of stroke 07/2001 with no significant neurological deficit, thought to be due to transient atrial fibrillation, right sided hemiparesis, aphasia.      Superior mesenteric vein thrombosis     Tinnitus of right ear     constant, since . Past Surgical History:        Procedure Laterality Date    CARDIAC CATHETERIZATION  01/10/2008    significant cardiomyopathy, ejection fraction 30%, global hypokinesis, sinus bradycardia into the 40s transiently, frequent PVCs, otherwise normal.     CARDIAC DEFIBRILLATOR PLACEMENT  2013    NOT MRI CONDITIONAL-[employer] ICD MODEL [id number] SERIAL [id number] LEAD RA MODEL 926193-92 SERIAL [id number] LEAD RV 360264-83 SERIAL [id number]    GASTROSTOMY TUBE PLACEMENT  2014    attempted peg tube, unsuccessful    HYSTERECTOMY, VAGINAL  1993    total with BSO for benign tumors.  ILEOSTOMY OR JEJUNOSTOMY  2013    jejunostomy    LAPAROSCOPY  1982    with D&C.      STOMACH SURGERY      gastropexy with G-tube holding in place       Social History:    Social History     Tobacco Use    Smoking status: Former Smoker     Packs/day: 0.50     Years: 26.00     Pack years: 13.00     Last attempt to quit: 7/15/2006     Years since quittin.7    Smokeless tobacco: Never Used    Tobacco comment: 1/2 pack a day, started in approximately , quit 2006   Substance Use Topics    Alcohol use: No     Alcohol/week: 0.0 oz                                Counseling given: Not Answered  Comment: 1/2 pack a day, started in approximately , quit 2006      Vital Signs (Current):   Vitals:    04/10/19 0600 04/10/19 0700 04/10/19 0849 04/10/19 1100   BP: 130/64 (!) 114/57  (!) 100/57   Pulse: 85 85  78   Resp:    Temp:  36.8 °C (98.2 °F)  36.3 °C (97.3 °F)   TempSrc:  Axillary  Axillary   SpO2:  96% 99% 100%   Weight:       Height:                                                  BP Readings from Last 3 Encounters:   04/10/19 (!) 100/57   19 (!) 104/58   10/18/18 120/78       NPO Status:                                                                                 BMI:   Wt Readings from Last 3 Encounters:   19 108 lb 7.5 oz (49.2 kg)   19 110 4/10/2019

## 2019-04-10 NOTE — PROGRESS NOTES
Active problem Multi infarct state . Atrial fibrillation . Left hemiparesis . Aphasia . The condition is she is to undergo J tube on Thursady on low intensity heparin and aspirin with xarelto being put on hold . She is alert following simple commands being anomic . She is lifting right arm and right leg with decreased right fine motor with only mild withdrawal of left arm and leg . She does have bilateral visual threat . She continues with NG tube feedings having failed swallow study to have open J tube with diaphragmatic hernia . She was admitted on March 28 with left hemiparesis and speech complaints . She has prior multi infarct state from atrial fibrillation having stopped xarelto presenting with new left side weakness and speech complaints . Head CT with attenuation right frontal parietal lobe with old large right parietal and left frontal infarction with bilateral chronic periventricular small vessel ischemia . She does have AICD unable to have MRI of Head . She has retained weakness of left arm and left leg with only mild withdrawal of these limbs with aphasia  . Cardiac 2 D echo EF 35 % . Apical hypokinesis . Grade 1 diastolic dysfunction . Mild MR and TR . CTA head and neck unremarkable. She was intubated in neuro ICU with respiratory failure along with aspiration pneumonia . Past Medical History:   Diagnosis Date    Anxiety     Blurred vision, bilateral     mild.  Bradycardia     intolerant of beta blockers, intolerant of ACE inhibitors.  Chronic renal insufficiency     Constipation     Depression     Elevated fasting glucose     Encephalomalacia     parietal.     Fibrocystic breast disease     Frontal headache     bilateral, mild, transient.  Genital atrophy of female     Grief reaction     death of mother.  Hiatal hernia     extremely large.  History of tobacco abuse     now quit.      Hyperlipidemia     Ileus (HCC)     Mild mitral regurgitation     Nonischemic cardiomyopathy ([address]     ejection fraction less than 30%, implantation of ICD.  Overweight     Paraesophageal hiatal hernia     Sick sinus syndrome ([address]     [address] 07/19/2006    acute, history of stroke 07/2001 with no significant neurological deficit, thought to be due to transient atrial fibrillation, right sided hemiparesis, aphasia.  Superior mesenteric vein thrombosis     Tinnitus of right ear     constant, since 1989. Past Surgical History:   Procedure Laterality Date    CARDIAC CATHETERIZATION  01/10/2008    significant cardiomyopathy, ejection fraction 30%, global hypokinesis, sinus bradycardia into the 40s transiently, frequent PVCs, otherwise normal.     CARDIAC DEFIBRILLATOR PLACEMENT  11/25/2013    NOT MRI CONDITIONAL-[employer] ICD MODEL [id number] SERIAL [id number] LEAD RA MODEL 698837-22 SERIAL [id number] LEAD RV 611544-34 SERIAL [id number]    GASTROSTOMY TUBE PLACEMENT  8-    attempted peg tube, unsuccessful    HYSTERECTOMY, VAGINAL  09/22/1993    total with BSO for benign tumors.  ILEOSTOMY OR JEJUNOSTOMY  08/30/2013    jejunostomy    LAPAROSCOPY  05/14/1982    with D&C.      STOMACH SURGERY  2015    gastropexy with G-tube holding in place       Family History   Problem Relation Age of Onset    Breast Cancer Mother 62    Cancer Mother         pancreatic    Diabetes Mother         \"borderline\"    High Blood Pressure Mother     Mental Illness Mother     Cancer Father         laryngeal    Diabetes Father     Stroke Maternal Grandmother     Diabetes Maternal Grandmother     Stroke Maternal Grandfather     Diabetes Maternal Grandfather     Stomach Cancer Paternal Grandmother     COPD Other        Social History     Socioeconomic History    Marital status:      Spouse name: None    Number of children: None    Years of education: None    Highest education level: None   Occupational History    None   Social Needs    Financial resource strain: None    Food insecurity:     Worry: None     Inability: None    Transportation needs:     Medical: None     Non-medical: None   Tobacco Use    Smoking status: Former Smoker     Packs/day: 0.50     Years: 26.00     Pack years: 13.00     Last attempt to quit: 7/15/2006     Years since quittin.7    Smokeless tobacco: Never Used    Tobacco comment: 1/2 pack a day, started in approximately , quit 2006   Substance and Sexual Activity    Alcohol use: No     Alcohol/week: 0.0 oz    Drug use: No    Sexual activity: Never   Lifestyle    Physical activity:     Days per week: None     Minutes per session: None    Stress: None   Relationships    Social connections:     Talks on phone: None     Gets together: None     Attends [employer] service: None     Active member of club or organization: None     Attends meetings of clubs or organizations: None     Relationship status: None    Intimate partner violence:     Fear of current or ex partner: None     Emotionally abused: None     Physically abused: None     Forced sexual activity: None   Other Topics Concern    None   Social History Narrative    None       Current Facility-Administered Medications   Medication Dose Route Frequency Provider Last Rate Last Dose    heparin 25,000 units in dextrose 5% 250 mL infusion  12 Units/kg/hr Intravenous Continuous Chema Stewart MD 9.8 mL/hr at 04/10/19 1046 20 Units/kg/hr at 04/10/19 1046    furosemide (LASIX) tablet 20 mg  20 mg Oral Daily ANASTACIO Hammer - CNP   20 mg at 04/10/19 0946    dextrose 50 % solution 25 g  25 g Intravenous PRN Stephanie West MD   25 g at 19 1220    midodrine (PROAMATINE) tablet 5 mg  5 mg Oral TID PRN Stephanie West MD        magic (miracle) mouthwash  5 mL Swish & Spit 4x Daily PRN Stephanie West MD   5 mL at 19 1818    sodium chloride flush 0.9 % injection 10 mL  10 mL Intravenous PRN ANASTACIO Hammer CNP   10 mL at 19 0805    sodium chloride flush 0.9 % injection 10 mL  10 mL Intravenous PRN Will Villagomez, APRN - CNP   10 mL at 04/05/19 1215    diphenoxylate-atropine (LOMOTIL) liquid 5 mL  5 mL Oral 4x Daily PRN Ginger Randhawa MD   5 mL at 04/08/19 2059    potassium chloride 10 mEq/100 mL IVPB (Peripheral Line)  10 mEq Intravenous PRN Krystin Ryan  mL/hr at 04/07/19 1604 10 mEq at 04/07/19 1604    fentaNYL (SUBLIMAZE) injection 25 mcg  25 mcg Intravenous Q1H PRN Glory Tovar, DO   25 mcg at 04/02/19 0534    [Held by provider] rivaroxaban (XARELTO) tablet 20 mg  20 mg Oral Daily Bobock Pages, DO   20 mg at 04/07/19 1818    senna (SENOKOT) tablet 8.6 mg  1 tablet Oral Nightly Kayli Saucedo MD   8.6 mg at 04/09/19 2034    aspirin chewable tablet 81 mg  81 mg Oral Daily Bobock La, DO   81 mg at 04/10/19 9655    atorvastatin (LIPITOR) tablet 40 mg  40 mg Oral Nightly Glory Tovar, DO   40 mg at 04/09/19 2034    sodium chloride flush 0.9 % injection 10 mL  10 mL Intravenous 2 times per day Preston Landrum MD   10 mL at 04/09/19 1038    sodium chloride flush 0.9 % injection 10 mL  10 mL Intravenous PRN Preston Landrum MD        magnesium hydroxide (MILK OF MAGNESIA) 400 MG/5ML suspension 30 mL  30 mL Oral Daily PRN Preston Landrum MD        ondansetron [facility]) injection 4 mg  4 mg Intravenous Q6H PRN Preston Landrum MD        acetaminophen (TYLENOL) suppository 650 mg  650 mg Rectal Q4H PRN Preston Landrum MD        hydrALAZINE (APRESOLINE) injection 5 mg  5 mg Intravenous Q6H PRN Preston Landrum MD        lidocaine (XYLOCAINE) 2% uro-jet   Topical Once Nga Wiley MD           Allergies   Allergen Reactions    Other Shortness Of Breath     Sawdust.       Ace Inhibitors Other (See Comments)     Hypotension, dizziness.  Beta Adrenergic Blockers Other (See Comments)     Hypotension, dizziness.      Adhesive Tape Rash       ROS:   Constitutional                  Negative for fever and chills   HEENT                            Negative for ear discharge, ear pain, nosebleed  Eyes                                Negative for photophobia, pain and discharge  Respiratory                      Negative for hemoptysis and sputum  Cardiovascular                Negative for orthopnea, claudication and PND  Gastrointestinal               Negative for abdominal pain, diarrhea, blood in stool  Musculoskeletal               Negative for joint pain, negative for myalgia  Skin                                 Negative for rash or itching  Endo/heme/allergies       Negative for polydipsia, environmental allergy  Psychiatric                       Negative for suicidal ideation. Patient is not anxious    Vitals:    04/10/19 1100   BP: (!) 100/57   Pulse: 78   Resp: 18   Temp: 97.3 °F (36.3 °C)   SpO2: 100%     Admission weight: 110 lb (49.9 kg)    Neurological Examination  Constitutional .General exam well groomed   Head/ Ears /Nose/Throat/external ear . Normal exam  Neck and thyroid . Normal size. No bruits  Cardiovascular: Auscultation of heart with regular rate and rhythm   Musculoskeletal. Muscle tone increased left arm and leg                                                          Muscle strength Mild withdrawal left arm and leg . Lifting right arm and right leg with decreased right hand fine motor  No dysmetria or dysdiadokinesis  No tremor   Orientation Alert and oriented x 3   Attention and concentration reduced  Short term memory reduced  Language process anomic . Following simple command and speech normal . No aphasia   Cranial nerve 2 normal visual threat  Cranial nerve 3, 4 and 6 . Extraocular muscles are intact . Pupils are equal and reactive   Cranial nerve 5 .. Intact corneal reflex. Normal facial sensation  Cranial nerve 7  Decreased left NLF   Cranial nerve 8. Grossly intact hearing   Cranial nerve 9 and 10.  Symmetric palate elevation   Cranial nerve 11 , 5 out of 5 strength   Cranial Nerve 12

## 2019-04-10 NOTE — CARE COORDINATION
Updated Dorys Palma. Care coordinator at [facility]. Faxed updated notes and notified her to clarify that patient will be ready on Friday. Will be going for a J-Tube tomorrow.

## 2019-04-10 NOTE — PROGRESS NOTES
[address] [facility]  Speech Language Pathology    Date: 4/10/2019  Patient Name: Petty Herrera  YOB: 1950   AGE: 81 y.o. MRN: [id number]        Patient Not Available for Speech Therapy     Due to:  [] Testing  [] Hemodialysis  [] Cancelled by RN  [] Surgery   [] Intubation/Sedation/Pain Medication  [] Medical instability  [x] Other: Att. X2. Pt. with RN and lethargic. Will att. tx. on 4/11     Next scheduled treatment: 4/11    Completed by: Completed by Tanya Flaherty,  Clinician  Co-signed by:  Tin Salgado M.S., CCC/SLP

## 2019-04-10 NOTE — PROGRESS NOTES
Physical Therapy  Facility/Department: [facility] NEURO  Daily Treatment Note  NAME: Sangeeta Jung  : 1950  MRN: [id number]    Date of Service: 4/10/2019    Discharge Recommendations:    Further therapy recommended at discharge. PT Equipment Recommendations  Equipment Needed: No    Patient Diagnosis(es): The encounter diagnosis was Stroke-like symptoms. 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   has a past surgical history that includes laparoscopy (1982); Hysterectomy, vaginal (1993); Cardiac defibrillator placement (2013); Cardiac catheterization (01/10/2008); Gastrostomy tube placement (2014); ileostomy or jejunostomy (2013); and Stomach surgery (). Restrictions  Restrictions/Precautions  Restrictions/Precautions: Fall Risk, General Precautions  Required Braces or Orthoses?: No  Position Activity Restriction  Other position/activity restrictions: L side deficits, non-verbal at baseline, up with A  Subjective   General  Response To Previous Treatment: Patient with no complaints from previous session. Family / Caregiver Present: No  Subjective  Subjective: Rn and pt agreeable to PT.  Resting in bed upon arrival, nods head yes to PT  Pain Screening  Patient Currently in Pain: No  Vital Signs  Patient Currently in Pain: No       Orientation  Orientation  Overall Orientation Status: Impaired  Orientation Level: Unable to assess(Unable to fully assess)  Cognition      Objective Bed mobility  Rolling to Left: Dependent/Total  Rolling to Right: Dependent/Total  Supine to Sit: Dependent/Total  Sit to Supine: Dependent/Total  Comment: Supine to sit with HOB greatly elevated, as pt offers no assistance  Transfers  Sit to Stand: Unable to assess(Not appropriate at this time)  Ambulation  Ambulation?: No     Balance  Posture: Poor  Sitting - Static: Poor  Sitting - Dynamic: Poor(Pt sat EOb x ~8  min, with max A to maintain due to post and L lateral lean throughout)    Exercise  B LE PROM/AAROM in all planes                       Assessment   Body structures, Functions, Activity limitations: Decreased functional mobility ; Decreased endurance;Decreased ROM; Decreased strength;Decreased safe awareness;Decreased balance;Decreased ADL status  Prognosis: Fair  REQUIRES PT FOLLOW UP: Yes  Activity Tolerance  Activity Tolerance: Patient Tolerated treatment well       Goals  Short term goals  Time Frame for Short term goals: 14 visits  Short term goal 1: Perform bed mobility with Mod A  Short term goal 2: Perform sit to stand transfer with Max A  Short term goal 3: Demo Poor+ dynamic standing balance with appropriate AD  Short term goal 4: Ambulate 30ft with green lift walker and Mod A  Short term goal 5: Participate in 30 minutes of therapy to demo increased endurance    Plan    Plan  Times per week: 5-6x/wk  Current Treatment Recommendations: Strengthening, Balance Training, Functional Mobility Training, Transfer Training, Endurance Training, Patient/Caregiver Education & Training, Safety Education & Training, Home Exercise Program, ROM, Neuromuscular Re-education, Gait Training  Plan Comment: attempt rich fernandez if appropriate  Safety Devices  Type of devices: Call light within reach, Nurse notified, Left in bed, Bed alarm in place  Restraints  Initially in place: No(Neuro mitt)     Therapy Time   Individual Concurrent Group Co-treatment   Time In 1407         Time Out 1431         Minutes 24 Mae Leslie, PTA

## 2019-04-10 NOTE — PROGRESS NOTES
Surgery Progress Note            PATIENT NAME: Matilde Justin     TODAY'S DATE: 4/10/2019, 6:19 AM    CC: dysphagia    SUBJECTIVE:    Pt seen and examined at bedside. Tolerating TF through NGT. No acute events overnight per nurse. ROS:  Unable to obtain due to patient's condition. OBJECTIVE:   VITALS:  BP (!) 124/57   Pulse 91   Temp 98.9 °F (37.2 °C) (Axillary)   Resp 20   Ht 5' 4\" (1.626 m)   Wt 108 lb 7.5 oz (49.2 kg)   SpO2 97%   BMI 18.62 kg/m²      INTAKE/OUTPUT:      Intake/Output Summary (Last 24 hours) at 4/10/2019 4992  Last data filed at 4/9/2019 1856  Gross per 24 hour   Intake 971 ml   Output 700 ml   Net 271 ml                   CONSTITUTIONAL:  no acute distress  HEAD: normocephalic, atraumatic  EYES: Pupils equal and reactive to light, Extraocular muscles intact, sclera non icteric  ENT: NGT in place  NECK:  supple, symmetrical, trachea midline   LUNGS:  Good air movement bilaterally, unlabored respirations, no wheezes or rhonchi  CARDIOVASCULAR: +S1, S2  ABDOMEN: soft, non tender, non distended, no rebound or guarding, scars consistent with previous open jejunostomy  MUSCULOSKELETAL:  No strength on LUE and LLE.   SKIN: No abscess or rash        Data:  CBC:   Lab Results   Component Value Date    WBC 9.9 04/10/2019    RBC 4.07 04/10/2019    HGB 12.7 04/10/2019    HCT 39.7 04/10/2019    MCV 97.5 04/10/2019    MCH 31.2 04/10/2019    MCHC 32.0 04/10/2019    RDW 13.8 04/10/2019     04/10/2019    MPV 10.8 04/10/2019     BMP:    Lab Results   Component Value Date     04/10/2019    K 4.1 04/10/2019    CL 99 04/10/2019    CO2 28 04/10/2019    BUN 25 04/10/2019    LABALBU 3.4 04/10/2019    LABALBU 4.1 03/27/2019    CREATININE 0.69 04/10/2019    CALCIUM 9.0 04/10/2019    GFRAA >60 04/10/2019    LABGLOM >60 04/10/2019    GLUCOSE 139 04/10/2019    GLUCOSE 97 03/27/2019             ASSESSMENT   Patient Active Problem List   Diagnosis    Elevated fasting glucose    Nonischemic cardiomyopathy ([address]    Hyperlipidemia    Anxiety    History of tobacco abuse    Fibrocystic breast disease    Mild mitral regurgitation    Sick sinus syndrome (HCC)    Automatic implantable cardioverter-defibrillator in situ    Cerebral infarction (HCC)    Superior mesenteric vein thrombosis    Functional urinary incontinence    Expressive aphasia    Stroke-like symptoms    Acute encephalopathy    History of cerebral infarction    History of implantable cardioverter-defibrillator (ICD) placement    Hiatal hernia    Chronic atrial fibrillation (HCC)    Acute ischemic stroke ([address]    Aspiration pneumonia of both upper lobes (HCC)    Moderate malnutrition (HCC)    Multi infarct state    Left hemiparesis ([address]    Aphasia    Dysphagia    Hyperkalemia       81 y.o. female with dysphagia. Failed swallow study on 4/8  Admitted on 3/28 due to stroke with left sided weakness. Has large diaphragmatic hiatal hernia with most of the stomach in chest cavity.     PLAN     1. Continue TF through NGT. Hold TF at [facility] for planned surgery tomorrow. 2. Hold Xarelto. Stop heparin at 5 AM on 4/11. 3. Cardiology to see patient today for risk stratification. Will f/u their recs. 4. If feeding tube placement continues to be indicated on Thursday, will proceed for laparoscopic J-tube or G-tube placement, possible open. Scheduled for tomorrow at 11 AM.  This procedure, including risks, benefits, alternatives and complications have been fully reviewed with patient's POA and informed consent has been obtained. All questions were answered appropriately.       Electronically signed by Bernadette Plummer DO  on 4/10/2019 at 6:19 AM

## 2019-04-11 NOTE — PROGRESS NOTES
Active problem Multi infarct state . Atrial fibrillation . Left hemiparesis . Aphasia . The condition is she underwent diagnostic laparoscopy today with lysis of adhesions with jejunostomy tube insertion to undergo J tube . She is stuporous post procedure not tracking visually with decreased withdrawal of left arm and leg . She was admitted on March 28 with left hemiparesis and speech complaints . She has prior multi infarct state from atrial fibrillation having stopped xarelto presenting with new left side weakness and speech complaints . Head CT with attenuation right frontal parietal lobe with old large right parietal and left frontal infarction with bilateral chronic periventricular small vessel ischemia . She does have AICD unable to have MRI of Head . She has retained weakness of left arm and left leg with only mild withdrawal of these limbs with aphasia  . Cardiac 2 D echo EF 35 % . Apical hypokinesis . Grade 1 diastolic dysfunction . Mild MR and TR . CTA head and neck unremarkable. She was intubated in neuro ICU with respiratory failure along with aspiration pneumonia . Significant medications aspirin 81 mg po qd , lipitor 40 mg po qd . Testing Head CT with attenuation right frontal parietal lobe with old large right parietal and left frontal infarction with bilateral chronic periventricular small vessel ischemia Cardiac 2 D echo EF 35 % . Apical hypokinesis . Grade 1 diastolic dysfunction . Mild MR and TR . CTA head and neck unremarkable       Past Medical History:   Diagnosis Date    Anxiety     Blurred vision, bilateral     mild.  Bradycardia     intolerant of beta blockers, intolerant of ACE inhibitors.  Chronic renal insufficiency     Constipation     Depression     Elevated fasting glucose     Encephalomalacia     parietal.     Fibrocystic breast disease     Frontal headache     bilateral, mild, transient.  Genital atrophy of female     Grief reaction     death of mother.      Hiatal hernia     extremely large.  History of tobacco abuse     now quit.  Hyperlipidemia     Ileus (HCC)     Mild mitral regurgitation     Nonischemic cardiomyopathy (HCC)     ejection fraction less than 30%, implantation of ICD.  Overweight     Paraesophageal hiatal hernia     Sick sinus syndrome ([address]     Stroke ([facility] 07/19/2006    acute, history of stroke 07/2001 with no significant neurological deficit, thought to be due to transient atrial fibrillation, right sided hemiparesis, aphasia.  Superior mesenteric vein thrombosis     Tinnitus of right ear     constant, since 1989. Past Surgical History:   Procedure Laterality Date    CARDIAC CATHETERIZATION  01/10/2008    significant cardiomyopathy, ejection fraction 30%, global hypokinesis, sinus bradycardia into the 40s transiently, frequent PVCs, otherwise normal.     CARDIAC DEFIBRILLATOR PLACEMENT  11/25/2013    NOT MRI CONDITIONAL-[employer] ICD MODEL OQXF7P0 SERIAL [id number] LEAD RA MODEL 769844-18 SERIAL [id number] LEAD RV 264816-87 SERIAL [id number]    ESOPHAGOSCOPY  04/11/2019    GASTROSTOMY TUBE PLACEMENT  8-    attempted peg tube, unsuccessful    HYSTERECTOMY, VAGINAL  09/22/1993    total with BSO for benign tumors.  ILEOSTOMY OR JEJUNOSTOMY  08/30/2013    jejunostomy    ILEOSTOMY OR JEJUNOSTOMY  04/11/2019     DIAGNOSTIC LAPAROSCOPY, LYSIS OF ADHESIONS, LAPARASCOPIC JEJUNOSTOMY TUBE INSERTION, ESOPGASCOPY    LAPAROSCOPY  05/14/1982    with D&C.      LAPAROSCOPY  04/11/2019     DIAGNOSTIC LAPAROSCOPY, LYSIS OF ADHESIONS, LAPARASCOPIC JEJUNOSTOMY TUBE INSERTION, ESOPGASCOPY    STOMACH SURGERY  2015    gastropexy with G-tube holding in place       Family History   Problem Relation Age of Onset    Breast Cancer Mother 62    Cancer Mother         pancreatic    Diabetes Mother         \"borderline\"    High Blood Pressure Mother     Mental Illness Mother     Cancer Father         laryngeal    Diabetes PRN Nica Adán, DO        magic (miracle) mouthwash  5 mL Swish & Spit 4x Daily PRN Nica Adán, DO   5 mL at 04/07/19 1818    sodium chloride flush 0.9 % injection 10 mL  10 mL Intravenous PRN Nica Adán, DO   10 mL at 04/05/19 0805    sodium chloride flush 0.9 % injection 10 mL  10 mL Intravenous PRN Nica [place], DO   10 mL at 04/05/19 1215    diphenoxylate-atropine (LOMOTIL) liquid 5 mL  5 mL Oral 4x Daily PRN Nica [place], DO   5 mL at 04/08/19 2059    potassium chloride 10 mEq/100 mL IVPB (Peripheral Line)  10 mEq Intravenous PRN Nica [place],  mL/hr at 04/07/19 1604 10 mEq at 04/07/19 1604    fentaNYL (SUBLIMAZE) injection 25 mcg  25 mcg Intravenous Q1H PRN Nica [place], DO   25 mcg at 04/02/19 0534    senna (SENOKOT) tablet 8.6 mg  1 tablet Oral Nightly Nica Adán, DO   Stopped at 04/11/19 9088    aspirin chewable tablet 81 mg  81 mg Oral Daily Nica [place], DO   81 mg at 04/10/19 0946    atorvastatin (LIPITOR) tablet 40 mg  40 mg Oral Nightly Nica Adán, DO   40 mg at 04/10/19 2254    sodium chloride flush 0.9 % injection 10 mL  10 mL Intravenous 2 times per day Nica [place], DO   10 mL at 04/09/19 1038    sodium chloride flush 0.9 % injection 10 mL  10 mL Intravenous PRN Nica [place], DO        magnesium hydroxide (MILK OF MAGNESIA) 400 MG/5ML suspension 30 mL  30 mL Oral Daily PRN Nica [place], DO        ondansetron [facility]) injection 4 mg  4 mg Intravenous Q6H PRN Nica [place], DO        acetaminophen (TYLENOL) suppository 650 mg  650 mg Rectal Q4H PRN Nica Adán, DO        hydrALAZINE (APRESOLINE) injection 5 mg  5 mg Intravenous Q6H PRN Nica [place], DO        lidocaine (XYLOCAINE) 2% uro-jet   Topical Once Nica [place], DO           Allergies   Allergen Reactions    Other Shortness Of Breath     Sawdust.       Ace Inhibitors Other (See Comments)     Hypotension, dizziness.  Beta Adrenergic Blockers Other (See Comments)     Hypotension, dizziness.      Adhesive Tape Rash       ROS: Constitutional                  Negative for fever and chills   HEENT                            Negative for ear discharge, ear pain, nosebleed  Eyes                                Negative for photophobia, pain and discharge  Respiratory                      Negative for hemoptysis and sputum  Cardiovascular                Negative for orthopnea, claudication and PND  Gastrointestinal               Negative for abdominal pain, diarrhea, blood in stool  Musculoskeletal               Negative for joint pain, negative for myalgia  Skin                                 Negative for rash or itching  Endo/heme/allergies       Negative for polydipsia, environmental allergy  Psychiatric                       Negative for suicidal ideation. Patient is not anxious    Vitals:    04/11/19 1332   BP: (!) 101/48   Pulse: 70   Resp: 16   Temp:    SpO2: 100%     Admission weight: 110 lb (49.9 kg)    Neurological Examination  Constitutional .General exam well groomed   Head/ Ears /Nose/Throat/external ear . Normal exam  Neck and thyroid . Normal size. No bruits  Cardiovascular: Auscultation of heart with regular rate and rhythm   Musculoskeletal. Muscle tone increased left arm and leg                                                          Muscle strength withdrawing all limbs mild left arm and left leg   No dysmetria or dysdiadokinesis  No tremor   Orientation Stuporous opening eyes to stimulation   Attention and concentration reduced  Short term memory reduced  Language process anomic . Following simple command and speech normal . No aphasia   Cranial nerve 2 normal visual threat  Cranial nerve 3, 4 and 6 . Extraocular muscles are intact . Pupils are equal and reactive   Cranial nerve 5 . Intact corneal reflex. Normal facial sensation  Cranial nerve 7  Decreased left NLF   Cranial nerve 8. Grossly intact hearing   Cranial nerve 9 and 10.  Symmetric palate elevation   Cranial nerve 11 , 5 out of 5 strength   Cranial Nerve 12 midline tongue . No atrophy  Sensation . Withdrawing all limbs mild left arm and left leg  Deep Tendon Reflexes brisker on left   Plantar response extensor on left     Assessment :    Multi infarct state . Atrial fibrillation . Left hemiparesis . Aphasia    Plan:    Resume xarelto when cleared by surgery .  ECF once begins G tube feedings

## 2019-04-11 NOTE — PROGRESS NOTES
[address] [facility]  Speech Language Pathology    Date: 4/11/2019  Patient Name: Vee Cancino  YOB: 1950   AGE: 81 y.o. MRN: [id number]        Patient Not Available for Speech Therapy     Due to:  [] Testing  [] Hemodialysis  [] Cancelled by RN  [x] Surgery: PEG tube  [] Intubation/Sedation/Pain Medication  [] Medical instability  [x] Other:    Next scheduled treatment: 4/12    Completed by: Completed by Kenya Clark,  Clinician  Co-signed by:  Shelby Moore M.S., CCC/SLP

## 2019-04-11 NOTE — ANESTHESIA PRE PROCEDURE
Department of Anesthesiology  Preprocedure Note       Name:  Travis Turcios   Age:  81 y.o.  :  1950                                          MRN:  [id number]         Date:  2019      Surgeon: Charlotte Buckner):  Jeanne Upotn MD    Procedure: LAPAROSCOPIC JEJUNOSTOMY TUBE INSERTION, POSSIBLE PEG, POSSIBLE OPEN  - GI UNIT SCHEDULED. (N/A )    Medications prior to admission:   Prior to Admission medications    Medication Sig Start Date End Date Taking? Authorizing Provider   atorvastatin (LIPITOR) 40 MG tablet take 1 tablet by mouth once daily 19   Jeferson ENRICO Hollis, DO   XARELTO 20 MG TABS tablet take 1 tablet by mouth once daily 10/12/18   Jeferson R Karatsoridis, DO   donepezil (ARICEPT) 10 MG tablet Take 10 mg by mouth daily    Historical Provider, MD [place] [address] [place] [address] Take by mouth daily    Historical Provider, MD   meclizine (ANTIVERT) 25 MG tablet Take 1 tablet by mouth 2 times daily as needed for Dizziness 17   Demterio Hughes MD   aspirin 81 MG tablet Take 1 tablet by mouth daily.  Medication per j tube 14   Elke Siegel       Current medications:    Current Facility-Administered Medications   Medication Dose Route Frequency Provider Last Rate Last Dose    [MAR Hold] furosemide (LASIX) tablet 20 mg  20 mg Oral Daily Aury Raleigh, APRN - CNP   20 mg at 04/10/19 0946    [MAR Hold] dextrose 50 % solution 25 g  25 g Intravenous PRN Westley Angeles MD   25 g at 19 1220    [MAR Hold] midodrine (PROAMATINE) tablet 5 mg  5 mg Oral TID PRN Westley Angeles MD        [facility] Hold] magic (miracle) mouthwash  5 mL Swish & Spit 4x Daily PRN Westley Angeles MD   5 mL at 19 1818    [MAR Hold] sodium chloride flush 0.9 % injection 10 mL  10 mL Intravenous PRN Aury Raleigh, APRN - CNP   10 mL at 19 0805    [MAR Hold] sodium chloride flush 0.9 % injection 10 mL  10 mL Intravenous PRN Aury Raleigh, APRN - CNP   10 mL at 19 1215    [MAR Hold] Hyperlipidemia E78.5    Anxiety F41.9    History of tobacco abuse Z87.891    Fibrocystic breast disease N60.19    Mild mitral regurgitation I34.0    Sick sinus syndrome (HCC) I49.5    Automatic implantable cardioverter-defibrillator in situ Z95.810    Cerebral infarction ([address] I63.9    Superior mesenteric vein thrombosis I81    Functional urinary incontinence R39.81    Expressive aphasia R47.01    Stroke-like symptoms R29.90    Acute encephalopathy G93.40    History of cerebral infarction Z86.73    History of implantable cardioverter-defibrillator (ICD) placement Z95.810    Hiatal hernia K44.9    Chronic atrial fibrillation (HCC) I48.2    Acute ischemic stroke (HCC) I63.9    Aspiration pneumonia of both upper lobes (HCC) J69.0    Moderate malnutrition (HCC) E44.0    Multi infarct state I69.30    Left hemiparesis ([address] G81.94    Aphasia R47.01    Dysphagia R13.10    Hyperkalemia E87.5       Past Medical History:        Diagnosis Date    Anxiety     Blurred vision, bilateral     mild.  Bradycardia     intolerant of beta blockers, intolerant of ACE inhibitors.  Chronic renal insufficiency     Constipation     Depression     Elevated fasting glucose     Encephalomalacia     parietal.     Fibrocystic breast disease     Frontal headache     bilateral, mild, transient.  Genital atrophy of female     Grief reaction     death of mother.  Hiatal hernia     extremely large.  History of tobacco abuse     now quit.  Hyperlipidemia     Ileus (HCC)     Mild mitral regurgitation     Nonischemic cardiomyopathy (HCC)     ejection fraction less than 30%, implantation of ICD.  Overweight     Paraesophageal hiatal hernia     Sick sinus syndrome ([address]     Stroke ([address] 07/19/2006    acute, history of stroke 07/2001 with no significant neurological deficit, thought to be due to transient atrial fibrillation, right sided hemiparesis, aphasia.      Superior mesenteric vein thrombosis     Tinnitus of right ear     constant, since . Past Surgical History:        Procedure Laterality Date    CARDIAC CATHETERIZATION  01/10/2008    significant cardiomyopathy, ejection fraction 30%, global hypokinesis, sinus bradycardia into the 40s transiently, frequent PVCs, otherwise normal.     CARDIAC DEFIBRILLATOR PLACEMENT  2013    NOT MRI CONDITIONAL-[employer] ICD MODEL [id number] SERIAL [id number] LEAD RA MODEL 026677-60 SERIAL [id number] LEAD RV 018441-57 SERIAL [id number]    GASTROSTOMY TUBE PLACEMENT  2014    attempted peg tube, unsuccessful    HYSTERECTOMY, VAGINAL  1993    total with BSO for benign tumors.  ILEOSTOMY OR JEJUNOSTOMY  2013    jejunostomy    LAPAROSCOPY  1982    with D&C.      STOMACH SURGERY      gastropexy with G-tube holding in place       Social History:    Social History     Tobacco Use    Smoking status: Former Smoker     Packs/day: 0.50     Years: 26.00     Pack years: 13.00     Last attempt to quit: 7/15/2006     Years since quittin.7    Smokeless tobacco: Never Used    Tobacco comment: 1/2 pack a day, started in approximately , quit 2006   Substance Use Topics    Alcohol use: No     Alcohol/week: 0.0 oz                                Counseling given: Not Answered  Comment: 1/2 pack a day, started in approximately , quit 2006      Vital Signs (Current):   Vitals:    19 0200 19 0400 19 0709 19 1032   BP: (!) 99/55 120/64 (!) 106/40 107/62   Pulse: 78 84 72 79   Resp: 18 21 15 16   Temp:   97 °F (36.1 °C) 97.5 °F (36.4 °C)   TempSrc:   Axillary Temporal   SpO2:   99% 99%   Weight:       Height:                                                  BP Readings from Last 3 Encounters:   19 107/62   19 (!) 104/58   10/18/18 120/78       NPO Status: Time of last liquid consumption: 15                        Time of last solid consumption: 15                        Date of last liquid consumption: 04/11/19                        Date of last solid food consumption: 04/11/19    BMI:   Wt Readings from Last 3 Encounters:   03/29/19 108 lb 7.5 oz (49.2 kg)   02/28/19 110 lb (49.9 kg)   10/18/18 121 lb 3.2 oz (55 kg)     Body mass index is 18.62 kg/m². CBC:   Lab Results   Component Value Date    WBC 7.9 04/11/2019    RBC 3.80 04/11/2019    HGB 11.6 04/11/2019    HCT 37.7 04/11/2019    MCV 99.2 04/11/2019    RDW 14.0 04/11/2019     04/11/2019       CMP:   Lab Results   Component Value Date     04/11/2019    K 4.1 04/11/2019     04/11/2019    CO2 31 04/11/2019    BUN 22 04/11/2019    CREATININE 0.66 04/11/2019    GFRAA >60 04/11/2019    LABGLOM >60 04/11/2019    GLUCOSE 104 04/11/2019    GLUCOSE 97 03/27/2019    PROT 6.4 04/10/2019    CALCIUM 8.7 04/11/2019    BILITOT 0.95 04/10/2019    ALKPHOS 50 04/10/2019    AST 41 04/10/2019    ALT 36 04/10/2019       POC Tests: No results for input(s): POCGLU, POCNA, POCK, POCCL, POCBUN, POCHEMO, POCHCT in the last 72 hours.     Coags:   Lab Results   Component Value Date    PROTIME 10.5 03/28/2019    INR 1.0 03/28/2019    APTT 22.9 04/11/2019       HCG (If Applicable): No results found for: PREGTESTUR, PREGSERUM, HCG, HCGQUANT     ABGs: No results found for: PHART, PO2ART, IWK3KVF, NPD1OFK, BEART, I6MWDFLD     Type & Screen (If Applicable):  No results found for: Marie Mendoza    Anesthesia Evaluation     Anesthesia Plan        Bill Owen MD   4/11/2019

## 2019-04-11 NOTE — PROGRESS NOTES
Physical Therapy  DATE: 2019    NAME: Katia Morris  MRN: [id number]   : 1950    Patient not seen this date for Physical Therapy due to:  [] Blood transfusion in progress  [] Hemodialysis  []  Patient Declined  [] Spine Precautions   [] Strict Bedrest  [x] Surgery/ Procedure---PEG, J or G tube  [] Testing     [] Other        [] PT being discontinued at this time. Patient independent. No further needs. [] PT being discontinued at this time as the patient has been transferred to palliative care. No further needs.     Anoop Swanson, PTA

## 2019-04-11 NOTE — PLAN OF CARE
Patient's rights, dignity, safety, and comfort maintained. Physiological integrity of all systems maintained. Right Mitt noninvasive restraint discontinued, as patient is no longer confused, and has stopped pulling at her lines, and tubes.   Electronically signed by Duong Aaron RN on 4/11/2019 at 6:58 AM

## 2019-04-11 NOTE — PROGRESS NOTES
Nutrition Assessment (Enteral Nutrition)    Type and Reason for Visit: Reassess    Nutrition Recommendations/Plan: Noted plan to restart tube feeding tomorrow morning. Will monitor TF tolerance/adequacy-modify TF as needed. Nutrition Assessment: Pt nutritionally compromised AEB swallowing difficulty, need for tube feeding. S/p J-tube placement today. TF currently on hold- plan to resume at 0600 tomorrow morning. Nutrition Risk Level: Moderate    Nutrition Needs:  · Estimated Daily Total Kcal: 8169-1745 kcal/day  · Estimated Daily Protein (g): 65-75 g/day    Nutrition Diagnosis:   · Problem: Inadequate oral intake  · Etiology: related to Difficulty swallowing     Signs and symptoms:  as evidenced by NPO status due to medical condition, Nutrition support - EN    Objective Information:  · Wound Type: Stage I, Pressure Ulcer (coccyx)  · Current Nutrition Therapies:  · Oral Diet Orders: NPO   · Tube Feeding (TF) Orders:   · Formula: Standard w/Fiber  · Rate (ml/hr):Goal 50 mL/hr - currently on hold   · Goal TF & Flush Orders Provides:1440 kcal and 67 g pro/day  · Anthropometric Measures:  · Ht: 5' 4\" (162.6 cm)   · Current Body Wt: 108 lb 7.5 oz (49.2 kg)  · Admission Body Wt: 108 lb (49 kg)  · Ideal Body Wt: 120 lb (54.4 kg), % Ideal Body 90%  · BMI Classification: BMI 18.5 - 24.9 Normal Weight    Nutrition Interventions:   Restart TF as able. Monitor tolerance/adequacy.    Continued Inpatient Monitoring, Education Not Indicated    Nutrition Evaluation:   · Evaluation: Progressing toward goals   · Goals: meet more than 75% of nutrition needs   · Monitoring: TF Intake, TF Tolerance, Weight, Pertinent Labs      Electronically signed by Jeff De Los Santos RD, LD on 4/11/19 at 4:17 PM    Contact Number: [phone]

## 2019-04-11 NOTE — BRIEF OP NOTE
Brief Postoperative Note  ______________________________________________________________    Patient: Sharla Farley  YOB: 1950  MRN: [id number]  Date of Procedure: 4/11/2019    Pre-Op Diagnosis: DYSPHAGIA    Post-Op Diagnosis: Same       Procedure(s):  DIAGNOSTIC LAPAROSCOPY, LYSIS OF ADHESIONS, LAPARASCOPIC JEJUNOSTOMY TUBE INSERTION, ESOPGASCOPY - GI UNIT SCHEDULED. Anesthesia: General    Surgeon(s):  Baljinder Hardy MD    Assistant: Daron Quiñones DO, PGY1    Estimated Blood Loss (mL): less than 50     Complications: None    Specimens:   * No specimens in log *    Implants:  Implant Name Type Inv. Item Serial No.  Lot No. LRB No. Used   JEJUNAL FEEDING TUBE   [id number] [facility] [id number] N/A 1         Drains:   NG/OG/NJ/NE Tube Nasogastric 18 fr Left nostril (Active)   Surrounding Skin Dry; Intact 4/11/2019  8:00 AM   Securement device Yes 4/11/2019  8:00 AM   Status Clamped 4/11/2019  8:00 AM   Placement Verified by Respiratory Status 4/11/2019  8:00 AM   Drainage Appearance None 4/11/2019  8:00 AM   Tube Feeding Standard with Fiber 4/10/2019  8:00 PM   Tube Feeding Status Stopped 4/11/2019  4:00 AM   Rate/Schedule 50 mL/hr 4/10/2019  8:00 PM   Tube Feeding Intake (mL) 509 ml 4/10/2019  6:25 PM   Free Water Flush (mL) 30 mL 4/11/2019  4:00 AM   Free Water Rate 100 4/8/2019  8:00 AM   Residual Volume (ml) 0 ml 4/11/2019  8:00 AM       Gastrostomy/Enterostomy/Jejunostomy Feeding Jejunostomy LUQ 1 14 fr (Active)       External Urinary Catheter (Active)   Urine Color Yellow 4/11/2019  8:00 AM   Urine Appearance Clear 4/11/2019  8:00 AM   Placement Replaced 4/10/2019  8:00 AM   Skin Assessment No Injury 4/11/2019  8:00 AM       [REMOVED] External Urinary Catheter (Removed)   Urine Color Tete 3/30/2019 12:00 AM   Urine Appearance Clear 3/30/2019 12:00 AM   Output (mL) 300 mL 3/29/2019  5:32 PM   Suction- Female Only 40 mmgHg continuous 3/30/2019 12:00 AM   Placement Replaced 3/29/2019  5:32 PM Skin Assessment No Injury 3/30/2019 12:00 AM       [REMOVED] External Urinary Catheter (Removed)   Urine Color Tete 3/31/2019  4:00 AM   Urine Appearance Clear 3/31/2019  4:00 AM   Output (mL) 200 mL 3/30/2019  4:00 PM   Suction- Female Only 40 mmgHg continuous 3/31/2019  4:00 AM   Placement Replaced 3/30/2019  8:00 PM       [REMOVED] External Urinary Catheter (Removed)   Urine Color Yellow 4/4/2019 10:00 AM   Urine Appearance Clear 4/4/2019 10:00 AM   Suction- Female Only 40 mmgHg continuous 4/4/2019 10:00 AM   Placement Initiated 4/4/2019 10:00 AM       [REMOVED] External Urinary Catheter (Removed)   Urine Color Yellow 4/10/2019 11:01 AM   Urine Appearance Clear 4/10/2019 11:01 AM   Urine Odor Other (Comment) 4/10/2019 11:01 AM   Output (mL) 175 mL 4/10/2019 11:01 AM   Suction- Female Only 40 mmgHg continuous 4/10/2019 11:01 AM   Placement Replaced 4/10/2019  6:28 AM   Skin Assessment No Injury 4/10/2019 11:01 AM       Findings: diagnostic laparoscopy with lysis of adhesions, esophagoscopy and unable to advance to gastric lumen. Placement of jejunostomy tube. OK to start tube feeds on 4/12/19. Keep abdominal binder in place.      Solon Closs, DO  Date: 4/11/2019  Time: 12:50 PM

## 2019-04-11 NOTE — PROGRESS NOTES
Lorraine Buchanan 21    Progress Note    4/11/2019    2:07 PM    Name:   Tramaine Damico  MRN:     [id number]     Acct:      [de-identified]   Room:   [address] [place] [address] Day:  15  Admit Date:  3/28/2019  1:05 AM    PCP:   Tennille Mendez DO  Code Status:  Full Code    Subjective:     C/C:   Chief Complaint   Patient presents with    Cerebrovascular Accident     Interval History Status: improved  No new issues reported overnight  Had J tube places given in ability to advance scope into stomach, laparoscopic procedure  No new weakness or numbness reported  No fevers, chills reported overnight    Brief History:      Per chart  The patient is a 81 y.o. Non-/non  female who presents with Cerebrovascular Accident   and she is admitted to the hospital for the management of  CVA  \" Initial Presentation (Admitted 3/28): The patient is S 66 y.o. female presented with presents with concern for CVA.  Transfer from [facility] ED, presented with NIH 21.  Last known well 1200 3/27/18.  Previous records indicate pt found by family w/ AMS, aphasia, Left sided deficits.  PMH of previous CVA, afib as on outpatient Xarelto but pt discontinued a few months.  Previous dysarthria, no reported residual weakness.   [facility] Course:   3/29: Intubated overnight for impending respiratory Failure. Copious secreations, remains on zosyn, zithromax.   3/30: Started on norepinephrine drip. Troponins are trending down.   3/31: remained on pressors, switch to dobutamine from levophed   4/1:   No acute events overnight.  Levophed switched to dobutamine yesterday, titrated up to 5 mcgs overnight for goal systolic blood pressure greater than 100.  Patient remains intubated, sedated.  Patient seen and evaluated at bedside this morning, following simple commands right upper and lower extremity.  Overall patient's condition slowly improving.     4/2:  No acute overnight events, more responsive and follows commnads, MAP above 70 as per a-line measurements, heart rhythm showed a.fib on dobtumaine running at 6 jan, afebrile, hbg 10.4 on 4/1 today 9.6, mg 1.8 and potassium 3.7 , replacement ordered to keep mg above 2 and potassium above 4. Cardiology on board for pvc,had BW today. Patient extubated  today doing well on Nc, will c/w zoysn for another day Zithromax d/c and midodrine increased to 10 mg and to wean off dobutimine  4/3:  Patient desaturate to 91 % was placed on a non breather and later on bipap and saturation improved. however cxr showed worsening opacities R>L,  therefore chest Pt started zithromax resumed and lasix 20 mg x1 given, off dobutamine and doing well neurologically. On tube feeds , swallow eval ordered didn't pass video ordered     After coming out of ICU to general floor, stroke work up with Echo showed low EF. She has stress test done by cardiology with recommendation for possible cardiac cath as outpatient. She also has persistent oropharyngeal dysphasia needing planned G or J tube placement    Review of Systems:     Per staff overnight  No reported fevers, chills   No episodes of vomiting, diarrhea reported  No skin rashes reported    Medications: Allergies: Allergies   Allergen Reactions    Other Shortness Of Breath     Sawdust.       Ace Inhibitors Other (See Comments)     Hypotension, dizziness.  Beta Adrenergic Blockers Other (See Comments)     Hypotension, dizziness.      Adhesive Tape Rash       Current Meds:   Scheduled Meds:    furosemide  20 mg Oral Daily    senna  1 tablet Oral Nightly    aspirin  81 mg Oral Daily    atorvastatin  40 mg Oral Nightly    sodium chloride flush  10 mL Intravenous 2 times per day    lidocaine   Topical Once     Continuous Infusions:     PRN Meds: dextrose, midodrine, magic (miracle) mouthwash, sodium chloride flush, sodium chloride flush, diphenoxylate-atropine, potassium chloride, fentanNYL, sodium RBC 4.23 4.07 3.80*   HGB 13.0 12.7 11.6*   HCT 41.6 39.7 37.7   MCV 98.3 97.5 99.2   MCH 30.7 31.2 30.5   MCHC 31.3 32.0 30.8   RDW 13.6 13.8 14.0    257 208   MPV 10.7 10.8 10.4     Chemistry:  Recent Labs     04/09/19  0509 04/10/19  0402 04/11/19  0345    136 139   K 4.5 4.1 4.1    99 101   CO2 26 28 31   GLUCOSE 111* 139* 104*   BUN 18 25* 22   CREATININE 0.62 0.69 0.66   MG 2.3 2.4 2.3   ANIONGAP 10 9 7*   LABGLOM >60 >60 >60   GFRAA >60 >60 >60   CALCIUM 9.0 9.0 8.7   PHOS 3.7 4.0 3.4     Recent Labs     04/10/19  0402   PROT 6.4   LABALBU 3.4*   AST 41*   ALT 36*   ALKPHOS 50   BILITOT 0.95     Lab Results   Component Value Date/Time    SPECIAL NOT REPORTED 03/29/2019 03:54 AM     Lab Results   Component Value Date/Time    CULTURE NORMAL RESPIRATORY SHEILA  LIGHT GROWTH   03/29/2019 03:54 AM     Lab Results   Component Value Date    POCPH 7.442 04/02/2019    POCPCO2 38.5 04/02/2019    POCPO2 79.0 04/02/2019    POCHCO3 26.3 04/02/2019    NBEA NOT REPORTED 04/02/2019    PBEA 2 04/02/2019    HJV5WIN 27 04/02/2019    TKOZ3BAA 96 04/02/2019    FIO2 30.0 04/02/2019     Physical Examination:        General Appearance:  alert, well appearing, aphasic, seems confused   Mental status: follows commands intermittently  Head:  normocephalic, atraumatic. Eye: no icterus, redness, pupils equal and reactive, extraocular eye movements intact, conjunctiva clear  Ear: normal external ear, no discharge, hearing intact  Nose:  no drainage noted, NGT  Mouth: mucous membranes moist  Neck: supple, no carotid bruits, thyroid not palpable  Lungs: bibasilar crakles, decreased effort   Cardiovascular: normal rate, regular rhythm, no murmur, gallop, rub.   Abdomen: Soft, nontender, nondistended, normal bowel sounds, no hepatomegaly or splenomegaly  Neurologic: muscle strength 1/5 on L side, 4/5 on the right side  normal muscle tone and bulk, aphasia, cant fully assess  Skin: No gross lesions, rashes, bruising or bleeding on exposed skin area  Extremities:  peripheral pulses palpable, no pedal edema or calf pain with palpation    Assessment:        Primary Problem  Acute ischemic stroke [facility])    Active Hospital Problems    Diagnosis Date Noted    Dysphagia [R13.10]     Hyperkalemia [E87.5]     Multi infarct state [I69.30] 04/08/2019    Left hemiparesis ([address] [G81.94] 04/08/2019    Aphasia [R47.01] 04/08/2019    Moderate malnutrition ([address] [E44.0] 04/03/2019    Acute ischemic stroke ([address] [I63.9]     Aspiration pneumonia of both upper lobes ([facility] [J69.0]     Stroke-like symptoms [R29.90] 03/28/2019    Acute encephalopathy [G93.40]     History of cerebral infarction [Z86.73]     History of implantable cardioverter-defibrillator (ICD) placement [Z95.810]     Hiatal hernia [K44.9]     Chronic atrial fibrillation ([address] [I48.2]      Plan:        -Stroke most likely emboli: Xarelto resumed ( currently on hold for the G tube placement).  On heparin drip     - Elevated trops: Stress test results reviewed,  Inf wall reversible ischemia, need LHC when more stable    - Nonischemic cardiomyopathy  S/P ICD: continue chronic meds, Not on BB 2/2 her BP    - Dysphagia: Status post J tube placed now    - Continued close follow up overnight, possible discharge next 48 hours if tube feeds are tolerated well      Glo Griggs MD  4/11/2019  2:07 PM

## 2019-04-11 NOTE — PLAN OF CARE
Patient remained free from injury. Patient verbalized understanding of need for the safety precautions. Demonstrates proper use of assistive devices. Bed remains in the lowest position. Call light remains within reach. [place] [facility] in use. Patient maintained a patient airway. Patients systolic BP dropped bellow 100, Internal Medicine contacted, 500 mL fluid bolus ordered and administered, BP stabilized to 120/64.   Electronically signed by Kathie Nevarez RN on 4/11/2019 at 6:56 AM

## 2019-04-11 NOTE — PROGRESS NOTES
Predicted       []  NA []  Above 69%  []  Unable []  Above 60-69%  []  Unable []  Above 50-59%  []  Unable []  Above 35-49%  []  Unable []  Less than 35%  []  Unable                 []  Hyperinflation Assessment  Score 1 2 3   CXR and Breath Sounds   [x]  Clear []  No atelectasis  Basilar aeration []  Atelectasis or absent basilar breath sounds   Incentive Spirometry Volume  (Per IBW)   []  Greater than or equal to 15ml/Kg []  less than 15ml/Kg []  less than 15ml/Kg   Surgery within last 2 weeks [x]  None or general   []  Abdominal or thoracic surgery  []  Abdominal or thoracic   Chronic Pulmonary Historyre [x]  No []  Yes []  Yes     []  Secretion Management Assessment  Score 1 2 3   Bilateral Breath Sounds   []  Occasional Rhonchi []  Scattered Rhonchi []  Course Rhonchi and/or poor aeration   Sputum    []  Small amount of thin secretions []  Moderate amount of viscous secretions []  Copius, Viscious Yellow/ Secretions   CXR as reported by physician [x]  clear  []  Unavailable []  Infiltrates and/or consolidation  []  Unavailable []  Mucus Plugging and or lobar consolidation  []  Unavailable   Cough []  Strong, productive cough []  Weak productive cough []  No cough or weak non-productive cough   URIEL CERDA  3:21 PM                            FEMALE                                  MALE                            FEV1 Predicted Normal Values                        FEV1 Predicted Normal Values          Age                                     Height in Feet and Inches       Age                                     Height in Feet and Inches       4' 11\" 5' 1\" 5' 3\" 5' 5\" 5' 7\" 5' 9\" 5' 11\" 6' 1\"  4' 11\" 5' 1\" 5' 3\" 5' 5\" 5' 7\" 5' 9\" 5' 11\" 6' 1\"   42 - 45 2.49 2.66 2.84 3.03 3.22 3.42 3.62 3.83 42 - 45 2.82 3.03 3.26 3.49 3.72 3.96 4.22 4.47   46 - 49 2.40 2.57 2.76 2.94 3.14 3.33 3.54 3.75 46 - 49 2.70 2.92 3.14 3.37 3.61 3.85 4.10 4.36   50 - 53 2.31 2.48 2.66 2.85 3.04 3.24 3.45 3.66 50 - 53 2.58 2.80 3.02 3.25 3. 49 3.73 3.98 4.24   54 - 57 2.21 2.38 2.57 2.75 2.95 3.14 3.35 3.56 54 - 57 2.46 2.67 2.89 3.12 3.36 3.60 3.85 4.11   58 - 61 2.10 2.28 2.46 2.65 2.84 3.04 3.24 3.45 58 - 61 2.32 2.54 2.76 2.99 3.23 3.47 3.72 3.98   62 - 65 1.99 2.17 2.35 2.54 2.73 2.93 3.13 3.34 62 - 65 2.19 2.40 2.62 2.85 3.09 3.33 3.58 3.84   66 - 69 1.88 2.05 2.23 2.42 2.61 2.81 3.02 3.23 66 - 69 2.04 2.26 2.48 2.71 2.95 3.19 3.44 3.70   70+ 1.82 1.99 2.17 2.36 2.55 2.75 2.95 3.16 70+ 1.97 2.19 2.41 2.64 2.87 3.12 3.37 3.62             Predicted Peak Expiratory Flow Rate                                       Height (in)  Female       Height (in) Male           Age 64 63 56 57 57 66 78 70 Age            21 344 357 372 387 402 417 432 446  60 62 64 66 68 70 72 74 76   25 337 352 366 381 396 411 426 441 25 447 476 505 533 562 [phone]   30 329 344 359 374 389 404 419 434 30 437 466 494 523 552 580 609 638 667   35 322 337 351 366 381 396 411 426 35 426 455 484 [phone] 657   40 314 329 344 359 374 389 404 419 40 416 445 473 502 531 559 588 617 647   45 307 322 336 351 366 381 396 411 45 405 434 463 491 520 549 577 606 636   50 299 314 329 344 359 374 389 404 50 395 424 452 481 510 538 567 596 625   55 292 307 321 336 351 366 381 396 55 384 413 442 470 499 528 556 585 615   60 284 299 314 329 344 359 374 389 60 374 403 431 460 489 517 546 575 605   65 277 292 306 321 336 351 366 381 65 363 392 421 449 478 507 535 564 594   70 269 284 299 314 329 344 359 374 70 353 382 410 439 468 496 525 554 583   75 261 274 289 305 319 334 348 364 75 344 372 400 429 458 487 515 544 573   80 253 266 282 296 312 327 342 356 80 335 362 390 419 448 476 [phone]

## 2019-04-11 NOTE — CARE COORDINATION
Transition Planning:  Update proved by bedside nurse that pt will be ready to transition tomorrow, no changes in TF orders will likely be in the afternoon. Spoke with Abigail Bergeron at [facility], informed pt PEG insertion completed today and plan for transition tomorrow afternoon. She requests updated clincal notes. Writer asked if they have access to [facility] she confirmed that John Le does at their facility and will request her to pull updated clinical.     [id number] Spoke with Adebayo Hernandez pt's brother in law, pt's sister Tala Lopez is unavailable to review and discuss transition plans for tomorrow. He reports she was just at the hospital. Apologized that CM must have missed her. Writer provides c/b number and he will provide her the message to call back. [id number] Received VM back from pt's sister Tala Lopez, confirms she is aware that pt is to be transitioning to [facility] on Friday later in the day.

## 2019-04-12 NOTE — FLOWSHEET NOTE
Patient extubated per resp therapy, placed on 4L/NC, oral care given, tolerated procedure well
Report called to Alem at [address].
Urology contacted R/E incontinence and large amt of residual in bladder, Will cont to monitor, and straight cath for residual >400
 Spiritual Assessment:  Patient is a 81year old female who is in the hospital because of a stroke. Patient is unresponsive and intubated. Her sister and niece are visiting today and sitting at her bedside. They are very concerned and attentive to her. They spoke of her terrell and her faithful attendance at her [facility].  Intervention:  I provided emotional and spiritual support for the patient's family. I listened as they told me about her including her terrell and her family which are very important to her. I told them of my visit with her yesterday and how she had her eyes open and was looking at me though she could not talk to me. I told them that I prayed for her and that she had tears in her eyes and that I assumed this meant the prayer was meaningful to her. I assured the family that the patient is in the right hospital and that she has excellent doctors and nurses caring for her. I also assured them of the concern of the spiritual care department and that chaplains are available as needed or requested. Family accepted my offer of prayer.  Outcome:  Family expressed gratitude for the care being provided for the patient. They thanked me for my concern and prayers. 03/29/19 2575   Encounter Summary   Services provided to: Patient and family together   Place of [address] [place] [address] [employer] No   Continue Visiting   (3/29/19)   Complexity of Encounter Low   Length of Encounter 15 minutes   Spiritual Assessment Completed Yes   Routine   Type Follow up   Assessment Unable to respond   Intervention Sustaining presence/ Ministry of presence; Discussed belief system/Jainism practices/terrell   Outcome Comfort;Expressed gratitude;Did not respond
Chaplain Denis, on 3/28/2019 at 8:35 AM.  [facility]  [phone]

## 2019-04-12 NOTE — OP NOTE
recovery  room in satisfactory condition.         Tomas Murrieta    D: 04/12/2019 8:06:18       T: 04/12/2019 12:54:26     [email or web address]  Job#: [id number]     Doc#: [id number]    CC:

## 2019-04-12 NOTE — DISCHARGE SUMMARY
Lorraine Buchanan 19    Discharge Summary     Patient ID: Carli Kim  :  1950   MRN: [id number]     ACCOUNT:  [de-identified]   Patient's PCP: Raina Mas DO  Admit Date: 3/28/2019   Discharge Date: 2019     Length of Stay: 15  Code Status:  Full Code  Admitting Physician: Alonso Vazquez MD  Discharge Physician: Shyam Peterson MD     Active Discharge Diagnoses:     Hospital Problem Lists:  Principal Problem:    Acute ischemic stroke [facility])  Active Problems:    Stroke-like symptoms    History of cerebral infarction    History of implantable cardioverter-defibrillator (ICD) placement    Hiatal hernia    Chronic atrial fibrillation (HCC)    Aspiration pneumonia of both upper lobes (HCC)    Moderate malnutrition ([address]    Multi infarct state    Left hemiparesis ([address]    Aphasia  Resolved Problems:    Acute encephalopathy    Dysphagia    Hyperkalemia    Admission Condition: poor    Discharged Condition: fair    Hospital Stay:     Hospital Course:  Miss Melodie Zambrano is a 81year old lady with history of chronic atrial fibrillation with previous history of multiple jeanine stroke and residual left sided palsy, non adherent to xarelto per chart, was admitted with worsened left sided weakness and also speech trouble. Diagnosed as acute ischemia of right fronto parietal region consistent with acute ischemia. Also noted large areas of encephalomalacia in bilateral cerebral hemispheres from previous strokes. Resumed xarelto during admission. Clinically from stroke she also developed significant encephalopathy with oropharyngeal dysphasia needing ventilator support for short time, able to be extubated after. Persistent OP dysphagia needing J tube placement. Difficult to have G tube given in ability of probe to pass with long history of significant hiatal hernia. Able to tolerate tube feeds at discharge.  Also has cardiomyopathy with EF at 28%, stress test showed large infarcted area likely from previous MI with no isaak infarct ischemia. Advised continued cardiology follow up. ICD in place already    Clinically at discharge, she is awake, looks to be following commands, but bilateral significant weakness of arms arms and legs with contractures noted. J tube for feedings. Discharge to rehab in stable condition    Significant therapeutic interventions: J tube placement  Intubation and extubation    Significant Diagnostic Studies:   Ct Head Wo Contrast    Result Date: 3/29/2019  EXAMINATION: CT OF THE HEAD WITHOUT CONTRAST  3/29/2019 4:46 am TECHNIQUE: CT of the head was performed without the administration of intravenous contrast. Dose modulation, iterative reconstruction, and/or weight based adjustment of the mA/kV was utilized to reduce the radiation dose to as low as reasonably achievable. COMPARISON: CT head done September 28, 2017. HISTORY: ORDERING SYSTEM PROVIDED HISTORY: left hemiparesis possibly acute, eval for acute ischemia (unable to get MRI due to PPM/AICD) TECHNOLOGIST PROVIDED HISTORY: Ordering Physician Provided Reason for Exam: eval for acute ischemia Acuity: Unknown Type of Exam: Unknown FINDINGS: BRAIN/VENTRICLES: Moderate-sized patchy area of hypoattenuation with loss of the gray-white matter interface in the medial right frontal parietal region may relate to acute infarct. Large areas of encephalomalacia in the bilateral cerebral hemispheres are unchanged from the prior study and likely relate to bilateral middle cerebral artery territory infarcts. Small left caudate remote lacunar infarct. No acute intracranial hemorrhage. No mass effect or midline shift. No abnormal extra-axial fluid collection. The basal cisterns are patent. Ill-defined periventricular white matter hypoattenuation, commonly seen in the setting of chronic small vessel ischemic disease. Generalized cerebral and cerebellar volume loss. No hydrocephalus.  ORBITS: The visualized portion of the orbits demonstrate no acute abnormality. SINUSES: The visualized paranasal sinuses and mastoid air cells demonstrate no acute abnormality. Partially visualized nasogastric tube. SOFT TISSUES/SKULL:  No acute abnormality of the visualized skull or soft tissues. Redemonstration of hyperostosis frontalis. Moderate-sized patchy area of hypoattenuation in the medial right frontal parietal region suggests acute infarct. No acute intracranial hemorrhage. Redemonstration of large areas of encephalomalacia in the bilateral cerebral hemispheres which are similar to the prior study and likely relate to old bilateral middle cerebral artery territory infarct. Chronic small vessel ischemic disease. Small left caudate remote lacunar infarct. Ct Head Wo Contrast    Result Date: 3/28/2019  Ordering Provider: [address] [place] [address]  Name:   José Miguel Rockwell         : 1950  Unit #:   [id number]         Age:  81  Account #:     [de-identified]      Attending Physician:     Pt Location:   ER   Date of Service:   19    CT HEAD W/O CONTRAST    HISTORY:  Stroke-like symptoms. Study:  Head CT without contrast dated 2019.  2137 hours       TECHNIQUE: Axial images were obtained through the brain without IV   contrast. Images were viewed in soft tissue and bone windows. Individualized   dose optimization techniques were used for the CT scan. Findings:  Large bilateral old frontal lobe infarcts are present. The   ventricles and cisternal spaces are normal in size and configuration without   midline shift or mass effect. There is no hemorrhage or hematoma. No acute   parenchymal abnormalities. IMPRESSION:   Large old bilateral middle cerebral territory infarcts. No acute brain   abnormalities. EXAM/ORDER VERIFICATION: Y  PT/FAMILY VERBALIZES UNDERSTANDING OF EXAM Y  PT SHIELDED N  Is pt pregnant?     LMP   TECH INITIALS MV COMMENTS       Electronically Signed by:  Adia Casper M.D.  03/28/19 0825  ________________________________________  Adia Casper M.D. Date Dict:  03/28/19 Ronan Robbins M.D. Date Trans: 03/28/19 0822 [facility]    [phone]    cc: Chris CANO Xr Chest Portable    Result Date: 4/5/2019  EXAMINATION: SINGLE XRAY VIEW OF THE CHEST 4/5/2019 7:19 am COMPARISON: 4 April 2019 HISTORY: ORDERING SYSTEM PROVIDED HISTORY: intubated, possible PNA TECHNOLOGIST PROVIDED HISTORY: intubated, possible PNA FINDINGS: AP portable view of the chest time stamped at 653 hours demonstrates overlying cardiac monitoring electrodes, multiple polar pacemaker entering from the left with intact leads in appropriate positions, and an intestinal tube terminating in the lateral fundus of the stomach. There is no significant interval change in the patient's cardiac size, pulmonary vascular congestion and scattered airspace disease. Left hemidiaphragm appears elevated. Small effusions are noted. Pulmonary vascular congestion, stable with scattered airspace disease and small bilateral effusions. Left hemidiaphragm is elevated. Xr Chest Portable    Result Date: 4/4/2019  EXAMINATION: SINGLE XRAY VIEW OF THE CHEST 4/4/2019 5:51 am COMPARISON: 3 April 2019 HISTORY: ORDERING SYSTEM PROVIDED HISTORY: intubated, possible PNA TECHNOLOGIST PROVIDED HISTORY: intubated, possible PNA FINDINGS: AP portable view of the chest time stamped at 452 hours demonstrates an intestinal tube terminating in the stomach, and a multiple polar pacemaker entering from the left with intact leads in appropriate position. Heart size is top-normal.  Persistent vascular congestion is noted with bilateral effusions and bibasilar airspace disease with slight clearing of airspace disease in the right upper lobe since prior study. No extrapleural air is noted.   No midline shift is seen.  Osseous structures are stable. Redemonstration of vascular congestion and scattered airspace disease with slight improvement in the right upper lobe. Bilateral effusions are noted. Tubes and lines as above. Xr Chest Portable    Result Date: 4/3/2019  EXAMINATION: SINGLE XRAY VIEW OF THE CHEST 4/3/2019 7:23 am COMPARISON: None. HISTORY: ORDERING SYSTEM PROVIDED HISTORY: intubated, possible PNA TECHNOLOGIST PROVIDED HISTORY: intubated, possible PNA FINDINGS: The enteric tube appears unchanged, terminating at the level of the diaphragmatic hernia. Left subclavian AICD in place. Bilateral airspace disease has progressed, more localized in the right upper lung field and medial right lung base. No pneumothorax identified. The right costophrenic angle is obscured in a small effusion may be present. Interval worsening of bilateral airspace disease, right greater than left. This may represent developing infection or possibly edema. Question small right effusion. Xr Chest Portable    Result Date: 4/2/2019  EXAMINATION: SINGLE XRAY VIEW OF THE CHEST 4/2/2019 5:38 am COMPARISON: April 1, 2019 HISTORY: ORDERING SYSTEM PROVIDED HISTORY: intubated, possible PNA TECHNOLOGIST PROVIDED HISTORY: intubated, possible PNA FINDINGS: ET tube terminates 4.5 cm from the bernadette. Enteric tube passes to the expected location of the stomach. Implanted cardiac device is present. Cardiac monitoring leads overlie the chest. The heart is stable in size. Decrease in size of left-sided pleural effusion and associated left basilar airspace disease. Layering right-sided effusion is evident. There is no pneumothorax. 1. Slight decrease in size of left-sided pleural effusion and associated airspace disease. 2. New hazy right basilar opacity could represent atelectasis or layering effusion. A combination of both is also considered. Short interval follow-up is advised.      Xr Chest Portable    Result Date: 7:01 am COMPARISON: 2018, 2018 HISTORY: ORDERING SYSTEM PROVIDED HISTORY: intubated, possible PNA TECHNOLOGIST PROVIDED HISTORY: intubated, possible PNA FINDINGS: Kyphotic positioning is noted. The endotracheal tube terminates 2.5 cm above the bernadette, which is acceptable for kyphotic positioning. The enteric tube terminates in the hiatal hernia. Large hiatal hernia with resulting shadow in the medial right hemithorax and inferior left hemithorax. Mild vascular congestion is noted. No pneumothorax identified. Left subclavian AICD in place. Interstitial opacities have increased in the interval without evidence for edema or focal consolidation. Large diaphragmatic hernia again demonstrated. Endotracheal and enteric tubes appear unchanged. Xr Chest Portable    Result Date: 3/28/2019  EXAMINATION: SINGLE XRAY VIEW OF THE CHEST 3/28/2019 9:05 pm COMPARISON:  HISTORY: ORDERING SYSTEM PROVIDED HISTORY: s/p intubation TECHNOLOGIST PROVIDED HISTORY: s/p intubation FINDINGS: ET tube projects over the mid trachea. Enteric tube terminates left of midline projecting over lobular high density which is likely contrast in a hiatal hernia or sequela of diaphragmatic hernia. Basilar airspace disease on the left is noted with poor visualization of the hemidiaphragm. ET tube as described. Probable left basilar airspace disease. Hiatal hernia versus diaphragmatic hernia which obscures the left hemidiaphragm and left lung base     Xr Chest Portable    Result Date: 3/28/2019  Ordering Provider: Jurgen Razo [facility]  Name:   Nicole Villalpando         : 1950  Unit #:   [id number]         Age:  81  Account #:     [de-identified]      Attending Physician:     Pt Location:   ER   Date of Service:   19    CHEST - PORTABLE    HISTORY:  Unresponsive. Left-sided body weakness.   History of strokes     Study:  Portable chest dated 2019.  2219 hours     Findings: Pacer and defibrillator wires are evident. The stomach is  largely intrathoracic. Eventration of the diaphragm is evident. Basilar  atelectasis is present. The rest of the lungs are clear     IMPRESSION:    Very large hiatal hernia. Basilar atelectasis. EXAM/ORDER VERIFICATION: Y  PT/FAMILY VERBALIZES UNDERSTANDING OF EXAM Y  PT SHIELDED Y  Is pt pregnant? LMP   TECH INITIALS MV               COMMENTS       Electronically Signed by:  Luis Silverman M.D.  03/28/19 1133  ________________________________________  Luis Silverman M.D. Date Dict:  03/28/19 Maddie Urrutia M.D. Date Trans: 03/28/19 0839 [facility]    [phone]    cc: Darron Habermann MD Cindee Cobia D. Darrold Elms R D. O. Xr Chest Portable    Result Date: 3/28/2019  EXAMINATION: SINGLE XRAY VIEW OF THE CHEST 3/28/2019 5:55 am COMPARISON: March 27, 2019. December 25, 2016. HISTORY: ORDERING SYSTEM PROVIDED HISTORY: Concern for aspiriation TECHNOLOGIST PROVIDED HISTORY: Concern for aspiriation FINDINGS: Stable left pectoral trans venous cardiac pacer/ICD. Low left lung volume with elevation of the left hemidiaphragm. Bilateral basilar predominant heterogeneous opacities are similar to the prior study. No definite pleural effusion or pneumothorax. Grossly stable cardiomediastinal silhouette and great vessels. Overall, no significant interval change with redemonstration of low left lung volume with elevation of the left hemidiaphragm and bilateral basilar predominant heterogeneous opacities. Correlate for possible hiatal or diaphragmatic hernia. Ct Chest Abdomen Pelvis W Contrast    Result Date: 3/28/2019  EXAMINATION: CT OF THE CHEST, ABDOMEN, AND PELVIS WITH CONTRAST 3/28/2019 11:41 am TECHNIQUE: CT of the chest, abdomen and pelvis was performed with the administration of intravenous contrast. Multiplanar reformatted images are provided for review.  Dose modulation, iterative reconstruction, and/or weight based adjustment of the mA/kV was utilized to reduce the radiation dose to as low as reasonably achievable. COMPARISON: CT abdomen and pelvis 12/19/2017 HISTORY: ORDERING SYSTEM PROVIDED HISTORY: abdominal distension, hiatal hernia, possible pneumonia TECHNOLOGIST PROVIDED HISTORY: Ordering Physician Provided Reason for Exam: abd pain Acuity: Unknown Type of Exam: Unknown FINDINGS: Chest: Mediastinum: The heart and great vessels appear unchanged. Left subclavian pacing device present. No enlarged or suspicious-appearing lymph nodes. No pericardial effusion. Large left diaphragmatic hernia is noted which extends into the right hemithorax. Lungs/pleura: Scattered airspace disease in the posterior right upper lobe with interstitial and ground-glass nodules are noted. Chronic but more prominent opacities in the visualized left lower lobe are noted as well. This may in part represent compressive atelectasis. Scattered interstitial and ground-glass opacities are also more prominent in the right lung base with findings of compressive atelectasis. Debris is present in the trachea and proximal right mainstem bronchus. No effusion. Soft Tissues/Bones: No acute osseous abnormality identified. Abdomen/Pelvis: Organs: Cholelithiasis without CT evidence for acute cholecystitis. The liver, pancreas, spleen, adrenals and kidneys reveal no acute findings. Probable scarring in the left renal kidney appears unchanged. Right renal cyst. GI/Bowel: Large left diaphragmatic hernia contains the stomach with organoaxial rotation. Loops of colon and small bowel are also present. The pancreas is also herniated. No inflammatory change or evidence for bowel obstruction. Patulous segment of the sigmoid colon with mild stool burden in the distal colon is a chronic appearance. Pelvis: No acute findings. Excretion of contrast in the bladder is noted. Peritoneum/Retroperitoneum: No free air or free fluid. No lymphadenopathy. The aorta is normal in caliber. Calcified atheromatous plaque is present. Bones/Soft Tissues: Osteopenia. Advanced facet arthropathy in the lower lumbar spine with grade 1 anterolisthesis of L4. Mild disc disease at L1-2. Thoracolumbar levoscoliosis is noted. Moderate degenerative change in the hips. Scattered interstitial and ground-glass opacities are more prominent in the lungs bilaterally. Debris in the trachea and right mainstem bronchus is also noted. Constellation of findings are suspicious for aspiration pneumonitis. Large left diaphragmatic hernia, as seen previously. Organo-axial rotation of the stomach is noted. Chronic findings, as above. Cta Head W Contrast    Result Date: 3/28/2019  Ordering Provider: [facility]  Name:   Jillian Combs         : 1950  Unit #:   [id number]         Age:  81  Account #:     [de-identified]      Attending Physician:     Pt Location:   ER   Date of Service:   19    CTA HEAD W/ CONTRAST; CTA NECK W/ CONTRAST    HISTORY:  Stroke-like symptoms       Study:  CTA of the head and neck with contrast dated 2019.  2236 hours       TECHNIQUE:   Axial images were obtained through the brain after the administration of 100   ml Visipaque 320. Coronal, sagittal, and 3-D reconstruction imaging was   performed. Individualized dose optimization techniques were utilized. After   this, axial images were obtained from the base of the skull down to the   aortic arch. 100 ml Visipaque 320 was injected. Coronal, sagittal, and 3-D   reconstruction imaging was performed. Individualized dose optimization   techniques were utilized. Findings: The common carotid arteries are normal in caliber and free of   plaque. The carotid bulbs and internal carotid arteries are normal in caliber without   evidence of significant plaque or stenosis.        Within the brain, cavernous carotids, supraclinoid internal carotids, middle   cerebral and anterior cerebral branches are normal in caliber without   evidence of stricture, aneurysm or vascular malformation. There is decreased   vascular flow in the location of the large middle cerebral territory infarcts   which are old. Basilar, posterior cerebral and superior cerebellar branches are normal in   caliber without evidence of aneurysm or vascular malformation. The aorta and pulmonary arteries are normal in caliber. Eventration of the   left hemidiaphragm is present. The stomach is largely intrathoracic. Associated basilar atelectasis is present on the left. IMPRESSION:   Unremarkable CTA of the head. Unremarkable CTA of the neck. EXAM/ORDER VERIFICATION: Y  PT/FAMILY VERBALIZES UNDERSTANDING OF EXAM Y  PT SHIELDED N  Is pt pregnant? LMP   TECH INITIALS MV               COMMENTS       Electronically Signed by:  Ajay Moyer M.D.  03/28/19 1133  ________________________________________  Ajay oMyer M.D. Date Dict:  03/28/19 Santiago Cortes M.D. Date Trans: 03/28/19 0828 [facility]    [phone]    cc: Verne Boas MD Evelyn Grave D. Delrae Roan R D. O. Nm Myocardial Spect Rest Exercise Or Rx    Result Date: 4/5/2019  EXAMINATION: MYOCARDIAL PERFUSION IMAGING 4/5/2019 8:48 am TECHNIQUE: For the rest study, 15 mCi of Tc 99 labeled sestamibi were injected. SPECT images were acquired. Under cardiology supervision, 0.4mg [place] was infused. After pharmacologic stress, 34.3 mCi of Tc 99 labeled sestamibi were injected. SPECT images with ECG gating were acquired. COMPARISON: None Available.  HISTORY: ORDERING SYSTEM PROVIDED HISTORY: TECHNOLOGIST PROVIDED HISTORY: Ordering Physician Provided Reason for Exam: Non-ischemic Cardiomyopathy with no signs of volume overload, dysarthria and left upper shoulder many weakness Additional signs and symptoms: S/P ICD, HTN, Sick sinus syndrome , than or equal to 10% myocardium or stress segmental scores indicating multiple vascular territories with abnormalities 4. Stress-induced LV dilatation (TID ratio greater than 1.19 for exercise and greater than 1.39 for regadenoson) Intermediate risk (1% to 3% annual death or MI) 1. Mild/moderate resting LV dysfunction (LVEF 35% to 49%) not readily explained by non coronary causes. 2. Resting perfusion abnormalities in 5%-9.9% of the myocardium in patients without a history or prior evidence of MI 3. Stress-induced perfusion abnormality encumbering 5%-9.9% of the myocardium or stress segmental scores indicating 1 vascular territory with abnormalities but without LV dilation 4. Small wall motion abnormality involving 1-2 segments and only 1 coronary bed. Low Risk (Less than 1% annual death or MI) 1. Normal or small myocardial perfusion defect at rest or with stress encumbering less than 5% of the myocardium. Technically suboptimal exam with numerous artifacts and GI tract activity. Large inferior wall infarct with isaak-infarct ischemia. This extends into the apical region. Global hypokinesis most pronounced in the inferior wall. LVEF 28%. The findings were sent to the Radiology Results [address] at 6:42 pm on 4/5/2019to be communicated to a licensed caregiver. Risk stratification: High risk. Fl Modified Barium Swallow W Video    Result Date: 4/8/2019  EXAMINATION: MODIFIED BARIUM SWALLOW WAS PERFORMED IN CONJUNCTION WITH SPEECH PATHOLOGY SERVICES TECHNIQUE: Fluoroscopic evaluation of the swallowing mechanism was performed. FLUOROSCOPY DOSE AND TYPE OR TIME AND EXPOSURES: 1.8 minutes, 1 image COMPARISON: 04/03/2019 HISTORY: ORDERING SYSTEM PROVIDED HISTORY: dysphagia FINDINGS: Single trial performed with pudding consistency. Moderate vallecular residual is seen. Penetration and silent aspiration of additional boluses as well as residual is noted.      Aspiration and penetration with pudding consistency. Please see separate speech pathology report for full discussion of findings and recommendations. Fl Modified Barium Swallow W Video    Result Date: 4/3/2019  EXAMINATION: MODIFIED BARIUM SWALLOW WAS PERFORMED IN CONJUNCTION WITH SPEECH PATHOLOGY SERVICES TECHNIQUE: Fluoroscopic evaluation of the swallowing mechanism was performed. FLUOROSCOPY DOSE AND TYPE OR TIME AND EXPOSURES: 0.3 minutes, 1 image COMPARISON: None HISTORY: ORDERING SYSTEM PROVIDED HISTORY: to eval swallow FINDINGS: Single trial performed with thick liquid consistency. Spillover directly into the airway with gross aspiration. Subsequent nonproductive cough is triggered. Moderate residual is noted. Gross aspiration as described. Please see separate speech pathology report for full discussion of findings and recommendations. Consultations:    Consults:     Final Specialist Recommendations/Findings:   IP CONSULT TO NEUROLOGY  IP CONSULT TO CARDIOLOGY  IP CONSULT TO UROLOGY  IP CONSULT TO GENERAL SURGERY  IP CONSULT TO DIETITIAN  IP CONSULT TO IV TEAM  IP CONSULT TO PHYSICAL MEDICINE REHAB  IP CONSULT TO HOSPITALIST  IP CONSULT TO IV TEAM  IP CONSULT TO GENERAL SURGERY      The patient was seen and examined on day of discharge and this discharge summary is in conjunction with any daily progress note from day of discharge.     Discharge plan:     Disposition: Skilled Facility    Physician Follow Up:   Renato Trinidad DO  [address] [place] [address]    Mariola Paz MD  [address]  [phone]    Schedule an appointment as soon as possible for a visit in 2 weeks  for hospital follow-up    Valente Reno, APRN - CNP  [place]  [id number] [facility]  [phone]    Go on 5/7/2019  @ 2:20 PM     Requiring Further Evaluation/Follow Up POST HOSPITALIZATION/Incidental Findings: none    Diet: tube feeds    Activity: As tolerated    Instructions to Patient: none    Discharge Medications:      Medication List      START taking these medications    furosemide 20 MG tablet  Commonly known as:  LASIX  Take 1 tablet by mouth daily        CONTINUE taking these medications    aspirin 81 MG tablet  Take 1 tablet by mouth daily. Medication per j tube     atorvastatin 40 MG tablet  Commonly known as:  LIPITOR  take 1 tablet by mouth once daily     meclizine 25 MG tablet  Commonly known as:  ANTIVERT  Take 1 tablet by mouth 2 times daily as needed for Dizziness     XARELTO 20 MG Tabs tablet  Generic drug:  rivaroxaban  take 1 tablet by mouth once daily        STOP taking these medications    donepezil 10 MG tablet  Commonly known as:  ARICEPT     FIBER SELECT GUMMIES PO           Where to Get Your Medications      These medications were sent to [facility], [address] [place] [address]. [address] [place]FI[place] Walker [facility], [address] [place] [id number]    Phone:  [phone]   · furosemide 20 MG tablet         Time Spent on discharge is  36 mins in patient examination, evaluation, counseling as well as medication reconciliation, prescriptions for required medications, discharge plan and follow up. Electronically signed by   Janet Espinoza MD  4/12/2019  9:18 AM      Thank you Dr. Gwendolyn Sung DO for the opportunity to be involved in this patient's care.

## 2019-04-12 NOTE — PLAN OF CARE
Neuro assessment completed, fall precautions in place, aspirations precautions in place, assess for barriers in communication and mobility, interventions to assist in communication and mobility in place, encouraged to call for assistance, adaptive devices used as needed, assess emotional state and support offered, encouraged patient to communicate by available means, and support systems included in patient care. Skin assessed for breakdown and redness, patient turned regularly, and heels elevated off of bed on pillows. Fall assessment preformed. Bed in low locked position with call light and tray table within reach. Education given. Will continue to monitor.

## 2019-04-12 NOTE — PROGRESS NOTES
Lorraine Buchanan [facility]    Progress Note    4/12/2019    9:14 AM    Name:   Maribel De Leon  MRN:     [id number]     Acct:      [de-identified]   Room:   [address] [place] [address] Day:  13  Admit Date:  3/28/2019  1:05 AM    PCP:   Vivien Parker DO  Code Status:  Full Code    Subjective:     C/C:   Chief Complaint   Patient presents with    Cerebrovascular Accident     Interval History Status: improved  Tolerated J tube procedure well yesterday  No fevers, chills reported  Starting tube feeds with J tube today  Vitals stable    Brief History:      Per chart  The patient is a 81 y.o. Non-/non  female who presents with Cerebrovascular Accident   and she is admitted to the hospital for the management of  CVA  \" Initial Presentation (Admitted 3/28): The patient is B 30 y.o. female presented with presents with concern for CVA.  Transfer from [facility] ED, presented with NIH 21.  Last known well 1200 3/27/18.  Previous records indicate pt found by family w/ AMS, aphasia, Left sided deficits.  PMH of previous CVA, afib as on outpatient Xarelto but pt discontinued a few months.  Previous dysarthria, no reported residual weakness.   [facility] Course:   3/29: Intubated overnight for impending respiratory Failure. Copious secreations, remains on zosyn, zithromax.   3/30: Started on norepinephrine drip. Troponins are trending down.   3/31: remained on pressors, switch to dobutamine from levophed   4/1:   No acute events overnight.  Levophed switched to dobutamine yesterday, titrated up to 5 mcgs overnight for goal systolic blood pressure greater than 100.  Patient remains intubated, sedated.  Patient seen and evaluated at bedside this morning, following simple commands right upper and lower extremity.  Overall patient's condition slowly improving.     4/2:  No acute overnight events, more responsive and follows commnads, MAP above 70 as per a-line measurements, heart rhythm showed a.fib on dobtumaine running at 6 jan, afebrile, hbg 10.4 on 4/1 today 9.6, mg 1.8 and potassium 3.7 , replacement ordered to keep mg above 2 and potassium above 4. Cardiology on board for pvc,had BW today. Patient extubated  today doing well on Nc, will c/w zoysn for another day Zithromax d/c and midodrine increased to 10 mg and to wean off dobutimine  4/3:  Patient desaturate to 91 % was placed on a non breather and later on bipap and saturation improved. however cxr showed worsening opacities R>L,  therefore chest Pt started zithromax resumed and lasix 20 mg x1 given, off dobutamine and doing well neurologically. On tube feeds , swallow eval ordered didn't pass video ordered     After coming out of ICU to general floor, stroke work up with Echo showed low EF. She has stress test done by cardiology with recommendation for possible cardiac cath as outpatient. She also has persistent oropharyngeal dysphasia needing planned G or J tube placement    Review of Systems:     Per staff overnight  No reported fevers, chills   No episodes of vomiting, diarrhea reported  No skin rashes reported   No changes in mentation    Medications: Allergies: Allergies   Allergen Reactions    Other Shortness Of Breath     Sawdust.       Ace Inhibitors Other (See Comments)     Hypotension, dizziness.  Beta Adrenergic Blockers Other (See Comments)     Hypotension, dizziness.      Adhesive Tape Rash       Current Meds:   Scheduled Meds:    rivaroxaban  20 mg Per J Tube Daily    furosemide  20 mg Oral Daily    senna  1 tablet Oral Nightly    aspirin  81 mg Oral Daily    atorvastatin  40 mg Oral Nightly    sodium chloride flush  10 mL Intravenous 2 times per day    lidocaine   Topical Once     Continuous Infusions:     PRN Meds: dextrose, midodrine, magic (miracle) mouthwash, sodium chloride flush, sodium chloride flush, diphenoxylate-atropine, potassium chloride, fentanNYL, sodium chloride flush, magnesium hydroxide, ondansetron, acetaminophen, hydrALAZINE    Data:     Past Medical History:   has a past medical history of Anxiety, Blurred vision, bilateral, Bradycardia, Chronic renal insufficiency, Constipation, Depression, Elevated fasting glucose, Encephalomalacia, Fibrocystic breast disease, Frontal headache, Genital atrophy of female, Grief reaction, Hiatal hernia, History of tobacco abuse, Hyperlipidemia, Ileus ([address] Mild mitral regurgitation, Nonischemic cardiomyopathy ([address] Overweight, Paraesophageal hiatal hernia, Sick sinus syndrome ([address] Stroke ([address] Superior mesenteric vein thrombosis, and Tinnitus of right ear. Social History:   reports that she quit smoking about 12 years ago. She has a 13.00 pack-year smoking history. She has never used smokeless tobacco. She reports that she does not drink alcohol or use drugs. Family History:   Family History   Problem Relation Age of Onset    Breast Cancer Mother 62    Cancer Mother         pancreatic    Diabetes Mother         \"borderline\"    High Blood Pressure Mother     Mental Illness Mother     Cancer Father         laryngeal    Diabetes Father     Stroke Maternal Grandmother     Diabetes Maternal Grandmother     Stroke Maternal Grandfather     Diabetes Maternal Grandfather     Stomach Cancer Paternal Grandmother     COPD Other      Vitals:  BP (!) 123/46   Pulse 84   Temp 98.7 °F (37.1 °C) (Axillary)   Resp 20   Ht 5' 4\" (1.626 m)   Wt 108 lb 7.5 oz (49.2 kg)   SpO2 100%   BMI 18.62 kg/m²   Temp (24hrs), Av.5 °F (36.4 °C), Min:96.7 °F (35.9 °C), Max:98.7 °F (37.1 °C)    No results for input(s): POCGLU in the last 72 hours. I/O (24Hr):     Intake/Output Summary (Last 24 hours) at 2019 0914  Last data filed at 2019 0616  Gross per 24 hour   Intake 1000 ml   Output 280 ml   Net 720 ml     Labs:    Hematology:  Recent Labs     19  1659 04/10/19  0402 19  0345   WBC 8.9 9.9 7.9   RBC 4.23

## 2019-04-12 NOTE — PROGRESS NOTES
Surgery Progress Note            PATIENT NAME: Maribel De Leon     TODAY'S DATE: 4/12/2019, 6:02 AM    CC: dysphagia    SUBJECTIVE:    Pt seen and examined at bedside. Patient nonverbal but will respond to some questions. NGT removed. Patient nods yes to feeling well. Denies pain, N/V/F/C.    ROS:  Unable to obtain due to patient's condition. OBJECTIVE:   VITALS:  BP (!) 107/49   Pulse 83   Temp 98.4 °F (36.9 °C) (Axillary)   Resp 19   Ht 5' 4\" (1.626 m)   Wt 108 lb 7.5 oz (49.2 kg)   SpO2 100%   BMI 18.62 kg/m²      INTAKE/OUTPUT:      Intake/Output Summary (Last 24 hours) at 4/12/2019 0602  Last data filed at 4/11/2019 1252  Gross per 24 hour   Intake 1000 ml   Output 5 ml   Net 995 ml                   CONSTITUTIONAL:  no acute distress  HEAD: normocephalic, atraumatic  EYES: Pupils equal and reactive to light, Extraocular muscles intact, sclera non icteric  ENT: NGT in place  NECK:  supple, symmetrical, trachea midline   LUNGS:  Good air movement bilaterally, unlabored respirations, no wheezes or rhonchi  CARDIOVASCULAR: +S1, S2  ABDOMEN: soft, non tender, non distended, incisions clean dry and intact with dermabond; J tube in left midabdomen without signs of infection, drainage. MUSCULOSKELETAL:  No strength on LUE and LLE.   SKIN: No abscess or rash        Data:  CBC:   Lab Results   Component Value Date    WBC 7.9 04/11/2019    RBC 3.80 04/11/2019    HGB 11.6 04/11/2019    HCT 37.7 04/11/2019    MCV 99.2 04/11/2019    MCH 30.5 04/11/2019    MCHC 30.8 04/11/2019    RDW 14.0 04/11/2019     04/11/2019    MPV 10.4 04/11/2019     BMP:    Lab Results   Component Value Date     04/11/2019    K 4.1 04/11/2019     04/11/2019    CO2 31 04/11/2019    BUN 22 04/11/2019    LABALBU 3.4 04/10/2019    LABALBU 4.1 03/27/2019    CREATININE 0.66 04/11/2019    CALCIUM 8.7 04/11/2019    GFRAA >60 04/11/2019    LABGLOM >60 04/11/2019    GLUCOSE 104 04/11/2019    GLUCOSE 97 03/27/2019 ASSESSMENT   Patient Active Problem List   Diagnosis    Elevated fasting glucose    Nonischemic cardiomyopathy (HCC)    Hyperlipidemia    Anxiety    History of tobacco abuse    Fibrocystic breast disease    Mild mitral regurgitation    Sick sinus syndrome (HCC)    Automatic implantable cardioverter-defibrillator in situ    Cerebral infarction ([address]    Superior mesenteric vein thrombosis    Functional urinary incontinence    Expressive aphasia    Stroke-like symptoms    Acute encephalopathy    History of cerebral infarction    History of implantable cardioverter-defibrillator (ICD) placement    Hiatal hernia    Chronic atrial fibrillation (HCC)    Acute ischemic stroke ([address]    Aspiration pneumonia of both upper lobes (HCC)    Moderate malnutrition (HCC)    Multi infarct state    Left hemiparesis ([address]    Aphasia    Dysphagia    Hyperkalemia       81 y.o. female POD1 s/p diagnostic laparoscopy, lysis of adhesions, esophagoscopy, laparoscopic J-tube placement  d/t with dysphagia. Failed swallow study on 4/8  Admitted on 3/28 due to stroke with left sided weakness. Has large diaphragmatic hiatal hernia with most of the stomach in chest cavity.     PLAN     1. Continue medical management and supportive care per primary team.  2. OK to begin tube feeds this AM.   3. OK to continue heparin  4. General service will sign off at this time. Thank you for the consult. Please call/page us if you have any questions/concerns. We appreciate being involved in the care of your patient. Electronically signed by Dallas Pérez DO  on 4/12/2019 at 6:02 AM     Patient seen and examined. Agree with above A/P.   Ok to start TF

## 2019-04-12 NOTE — CARE COORDINATION
Discharge [facility] Case Management Department  Written by: Geraldine Segundo RN    Patient Name: Sola Mejia  Attending Provider: No att. providers found  Admit Date: 3/28/2019  1:05 AM  MRN: [id number]  Account: [de-identified]                     : 1950  Discharge Date: 2019      Disposition: SNF- to [facility]. Transported by [facility].     Geraldine Segundo RN

## 2019-04-12 NOTE — PROGRESS NOTES
Active problem Multi infarct state . Atrial fibrillation . Left hemiparesis . Aphasia . The condition is she has undergone diagnostic laparoscopy today with lysis of adhesions with jejunostomy tube insertion to undergo J tube . She is alert tracking visually anomic with decreased withdrawal of left arm and leg . Xarelto has been resumed at 20 mg po qd . She was admitted on March 28 with left hemiparesis and speech complaints . She has prior multi infarct state from atrial fibrillation having stopped xarelto presenting with new left side weakness and speech complaints . Head CT with attenuation right frontal parietal lobe with old large right parietal and left frontal infarction with bilateral chronic periventricular small vessel ischemia . She does have AICD unable to have MRI of Head . She has retained weakness of left arm and left leg with only mild withdrawal of these limbs with aphasia  . Cardiac 2 D echo EF 35 % . Apical hypokinesis . Grade 1 diastolic dysfunction . Mild MR and TR . CTA head and neck unremarkable. She was intubated in neuro ICU with respiratory failure along with aspiration pneumonia . Significant medications aspirin 81 mg po qd , xarelto 20 mg po qd, lipitor 40 mg po qd . Testing Head CT with attenuation right frontal parietal lobe with old large right parietal and left frontal infarction with bilateral chronic periventricular small vessel ischemia Cardiac 2 D echo EF 35 % . Apical hypokinesis . Grade 1 diastolic dysfunction . Mild MR and TR . CTA head and neck unremarkable       Past Medical History:   Diagnosis Date    Anxiety     Blurred vision, bilateral     mild.  Bradycardia     intolerant of beta blockers, intolerant of ACE inhibitors.  Chronic renal insufficiency     Constipation     Depression     Elevated fasting glucose     Encephalomalacia     parietal.     Fibrocystic breast disease     Frontal headache     bilateral, mild, transient.      Genital atrophy of female History   Problem Relation Age of Onset    Breast Cancer Mother 62    Cancer Mother         pancreatic    Diabetes Mother         \"borderline\"    High Blood Pressure Mother     Mental Illness Mother     Cancer Father         laryngeal    Diabetes Father     Stroke Maternal Grandmother     Diabetes Maternal Grandmother     Stroke Maternal Grandfather     Diabetes Maternal Grandfather     Stomach Cancer Paternal Grandmother     COPD Other        Social History     Socioeconomic History    Marital status:      Spouse name: None    Number of children: None    Years of education: None    Highest education level: None   Occupational History    None   Social Needs    Financial resource strain: None    Food insecurity:     Worry: None     Inability: None    Transportation needs:     Medical: None     Non-medical: None   Tobacco Use    Smoking status: Former Smoker     Packs/day: 0.50     Years: 26.00     Pack years: 13.00     Last attempt to quit: 7/15/2006     Years since quittin.7    Smokeless tobacco: Never Used    Tobacco comment: 1/2 pack a day, started in approximately , quit 2006   Substance and Sexual Activity    Alcohol use: No     Alcohol/week: 0.0 oz    Drug use: No    Sexual activity: Never   Lifestyle    Physical activity:     Days per week: None     Minutes per session: None    Stress: None   Relationships    Social connections:     Talks on phone: None     Gets together: None     Attends Hindu service: None     Active member of club or organization: None     Attends meetings of clubs or organizations: None     Relationship status: None    Intimate partner violence:     Fear of current or ex partner: None     Emotionally abused: None     Physically abused: None     Forced sexual activity: None   Other Topics Concern    None   Social History Narrative    None       Current Facility-Administered Medications   Medication Dose Route Frequency Provider Last Rate Last Dose    rivaroxaban (XARELTO) tablet 20 mg  20 mg Per J Tube Daily Randal Bliss MD        furosemide (LASIX) tablet 20 mg  20 mg Oral Daily Delwyn Karvonen, DO   20 mg at 04/12/19 1003    dextrose 50 % solution 25 g  25 g Intravenous PRN Delwsean Wilhelmvonen, DO   25 g at 04/08/19 1220    midodrine (PROAMATINE) tablet 5 mg  5 mg Oral TID PRN Delwyn Karvonen, DO        magic (miracle) mouthwash  5 mL Swish & Spit 4x Daily PRN Delyaneli Wilhelmvonen, DO   5 mL at 04/07/19 1818    sodium chloride flush 0.9 % injection 10 mL  10 mL Intravenous PRN Delaleshiayn Karvonen, DO   10 mL at 04/05/19 0805    sodium chloride flush 0.9 % injection 10 mL  10 mL Intravenous PRN Delaleshiayn Karvonen, DO   10 mL at 04/05/19 1215    diphenoxylate-atropine (LOMOTIL) liquid 5 mL  5 mL Oral 4x Daily PRN Delyaneli Wilhelmvonen, DO   5 mL at 04/08/19 2059    potassium chloride 10 mEq/100 mL IVPB (Peripheral Line)  10 mEq Intravenous PRN Dallas Huinen,  mL/hr at 04/07/19 1604 10 mEq at 04/07/19 1604    fentaNYL (SUBLIMAZE) injection 25 mcg  25 mcg Intravenous Q1H PRN Delyaneli Karvonen, DO   25 mcg at 04/02/19 0534    senna (SENOKOT) tablet 8.6 mg  1 tablet Oral Nightly Delwyn Karvonen, DO   Stopped at 04/11/19 5067    aspirin chewable tablet 81 mg  81 mg Oral Daily Delwyn Chocovonen, DO   81 mg at 04/12/19 1003    atorvastatin (LIPITOR) tablet 40 mg  40 mg Oral Nightly Delwyn Karvonen, DO   40 mg at 04/10/19 2254    sodium chloride flush 0.9 % injection 10 mL  10 mL Intravenous 2 times per day Dallas Karvonen, DO   10 mL at 04/12/19 1004    sodium chloride flush 0.9 % injection 10 mL  10 mL Intravenous PRN Dallas Pérez, DO        magnesium hydroxide (MILK OF MAGNESIA) 400 MG/5ML suspension 30 mL  30 mL Oral Daily PRN Dallas Pérez, DO        [facility] injection 4 mg  4 mg Intravenous Q6H PRN Dallas Pérez, DO        acetaminophen (TYLENOL) suppository 650 mg  650 mg Rectal Q4H PRN Dallas Pérez, DO        hydrALAZINE (APRESOLINE) injection 5 mg  5 mg Intravenous Q6H PRN Dallas Pérez, DO        lidocaine (XYLOCAINE) 2% uro-jet   Topical Once Mark Rickie, DO           Allergies   Allergen Reactions    Other Shortness Of Breath     Sawdust.       Ace Inhibitors Other (See Comments)     Hypotension, dizziness.  Beta Adrenergic Blockers Other (See Comments)     Hypotension, dizziness.  Adhesive Tape Rash       ROS:   Constitutional                  Negative for fever and chills   HEENT                            Negative for ear discharge, ear pain, nosebleed  Eyes                                Negative for photophobia, pain and discharge  Respiratory                      Negative for hemoptysis and sputum  Cardiovascular                Negative for orthopnea, claudication and PND  Gastrointestinal               Negative for abdominal pain, diarrhea, blood in stool  Musculoskeletal               Negative for joint pain, negative for myalgia  Skin                                 Negative for rash or itching  Endo/heme/allergies       Negative for polydipsia, environmental allergy  Psychiatric                       Negative for suicidal ideation. Patient is not anxious    Vitals:    04/12/19 0716   BP: (!) 123/46   Pulse: 84   Resp: 20   Temp: 98.7 °F (37.1 °C)   SpO2: 100%     Admission weight: 110 lb (49.9 kg)    Neurological Examination  Constitutional .General exam well groomed   Head/ Ears /Nose/Throat/external ear . Normal exam  Neck and thyroid . Normal size. No bruits  Cardiovascular: Auscultation of heart with regular rate and rhythm   Musculoskeletal. Muscle tone increased left arm and leg                                                          Muscle strength withdrawing all limbs mild left arm and left leg   No dysmetria or dysdiadokinesis  No tremor   Orientation Alert opening eyes to stimulation   Attention and concentration reduced  Short term memory reduced  Language process anomic .  Following simple command and speech normal . No aphasia   Cranial nerve 2 normal visual threat  Cranial nerve 3, 4 and 6 . Extraocular muscles are intact . Pupils are equal and reactive   Cranial nerve 5 . Intact corneal reflex. Normal facial sensation  Cranial nerve 7  Decreased left NLF   Cranial nerve 8. Grossly intact hearing   Cranial nerve 9 and 10. Symmetric palate elevation   Cranial nerve 11 , 5 out of 5 strength   Cranial Nerve 12 midline tongue . No atrophy  Sensation . Withdrawing all limbs mild left arm and left leg  Deep Tendon Reflexes brisker on left   Plantar response extensor on left     Assessment :    Multi infarct state . Atrial fibrillation . Left hemiparesis .  Aphasia    Plan:    Patient to be discharged to [facility] today

## 2019-04-12 NOTE — PROGRESS NOTES
Speech Language Pathology  Speech Language Pathology  [facility]    Speech Language Treatment Note    Date: 4/12/2019  Patients Name: Say Urban  MRN: [id number]  Diagnosis:   Patient Active Problem List   Diagnosis Code    Elevated fasting glucose R73.01    Nonischemic cardiomyopathy (HCC) I42.8    Hyperlipidemia E78.5    Anxiety F41.9    History of tobacco abuse Z87.891    Fibrocystic breast disease N60.19    Mild mitral regurgitation I34.0    Sick sinus syndrome ([address] I49.5    Automatic implantable cardioverter-defibrillator in situ Z95.810    Cerebral infarction ([address] I63.9    Superior mesenteric vein thrombosis I81    Functional urinary incontinence R39.81    Expressive aphasia R47.01    Stroke-like symptoms R29.90    History of cerebral infarction Z86.73    History of implantable cardioverter-defibrillator (ICD) placement Z95.810    Hiatal hernia K44.9    Chronic atrial fibrillation (HCC) I48.2    Acute ischemic stroke (HCC) I63.9    Aspiration pneumonia of both upper lobes (HCC) J69.0    Moderate malnutrition (HCC) E44.0    Multi infarct state I69.30    Left hemiparesis ([address] G81.94    Aphasia R47.01       Pain: 0/10    Speech and Language Treatment  Treatment time: 7829-8389    Subjective: [x] Alert [x] Cooperative     [] Confused     [] Agitated    [x] Lethargic    Objective/Assessment:    Auditory Comprehension: 1 units commands: 0/4 did not increase with max verbal and visual cues. Verbal Expression: yes/no questions: ST modified questions. Ex: \"Is your name Renee Medeiros? \" Pt. Would smile. ST would then say \"is your name Harinder Suarez? \". Pt. Would smile and nod head yes. Ex: ST points to own nose and says\" is this my eyes\". Pt. Again smiles. ST then points at own nose and says \"is this my nose? \". Pt. Would smile and nod head yes. **Pt. Laughing intermittently throughout.  No other verbalizations noted throughout session despite max cues.  Pt. Noted watching television and pt. smiled on scene of female and male kissing.      Further therapy recommended at discharge. Plan:  [x] Continue ST services    [] Discharge from ST:      Discharge recommendations: [] Inpatient Rehab   [] [place]   [] Outpatient Therapy  [] Follow up at trauma clinic   [x] Other: Therapy    Treatment completed by: Completed by Howie Medellin,  Clinician  Co-signed by:  Dave Bush M.S., CCC/SLP

## 2019-04-12 NOTE — PROGRESS NOTES
Occupational Therapy  Facility/Department: [facility] NEURO  Daily Treatment Note  NAME: Juana Graham  : 1950  MRN: [id number]    Date of Service: 2019    Discharge Recommendations: Further therapy recommended at discharge. Assessment   Performance deficits / Impairments: Decreased functional mobility ; Decreased high-level IADLs;Decreased ADL status; Decreased endurance;Decreased balance;Decreased safe awareness;Decreased strength;Decreased cognition;Decreased ROM; Decreased fine motor control;Decreased coordination  Prognosis: Poor  Patient Education: Ed on OT services, gentle stretching/ROM to B UE d/t pt displaying tight muscles/ contractures, poor head posture and looking up -fair return  REQUIRES OT FOLLOW UP: Yes  Activity Tolerance  Activity Tolerance: Patient Tolerated treatment well  Activity Tolerance: Non-verbal at baseline         Patient Diagnosis(es): The encounter diagnosis was Stroke-like symptoms. 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   has a past surgical history that includes laparoscopy (1982); Hysterectomy, vaginal (1993); Cardiac defibrillator placement (2013); Cardiac catheterization (01/10/2008); Gastrostomy tube placement (2014); ileostomy or jejunostomy (2013); Stomach surgery (); laparoscopy (2019); ileostomy or jejunostomy (2019);  Esophagoscopy (2019); and Jenunostomy Tube Insertion (N/A, 4/11/2019). Restrictions  Restrictions/Precautions  Restrictions/Precautions: Fall Risk, General Precautions  Required Braces or Orthoses?: No  Position Activity Restriction  Other position/activity restrictions: L side deficits, non-verbal at baseline, up with A  Subjective   General  Patient assessed for rehabilitation services?: Yes  Family / Caregiver Present: No  Diagnosis: R MCA CVA  Vital Signs  Patient Currently in Pain: No   Orientation  Orientation  Overall Orientation Status: Impaired  Orientation Level: Oriented to person;Disoriented to place; Disoriented to time;Disoriented to situation  Objective    Pt in bed upon arrival sleeping. Max encouragement to wake, writer turned on lights and placed bed in chair position- good return. When [facility] elevated pt unable to maintain G head posture for self ( flexed head posture noted) and writer assisted pt in cervical head rotation L<>R/ up/down ( 6 reps +1 set). HOB slightly lowered to allow pt's head to lower back onto the pillow for comfort reasons and safety. Pt unable to use B UE functionally. Pt's B hands displaying contractures and rolled up wash cloth placed in pt's hand for skin protection. Pt demo facial grimacing/ cried out in pain when writer was attempting to open pt's fingers when placing cloth roll in palm. Pt unable to tolerate gentle stretching to digits d/t increased contractures causing pt pain w/ gentle movement noted. Pt's B UE displaying increased tone and increased time to complete UE gentle stretching/ROM to all joints in all planes to increase independence w/ ADLs ( 10 reps + 1 set)  Pt dependent to wash face and comb hair.   ADL  Feeding: NPO      Attendance  Participation: Active participation          Plan   Plan  Times per week: 3-4x  Current Treatment Recommendations: Balance Training, Functional Mobility Training, Endurance Training, Patient/Caregiver Education & Training, Self-Care / ADL, Equipment Evaluation, Education, & procurement, Safety Education & Training, Pain Management, Neuromuscular Re-education    Goals  Short term goals  Time Frame for Short term goals: Pt will by discharge   Short term goal 1: demo ADL UB dressing/bathing activity supine/seated with max A and max vc's  Short term goal 2: demo static/dynamic sitting on EOB with mod A for 6 min+  Short term goal 3: demo activity tolerance for 15 min  Short term goal 4: demo supine<>sit transfer to EOB with max Ax1       Therapy Time   Individual Concurrent Group Co-treatment   Time In 1035         Time Out 1101         Minutes 612 , SALCEDO/L

## 2019-04-12 NOTE — ANESTHESIA POSTPROCEDURE EVALUATION
Department of Anesthesiology  Postprocedure Note    Patient: Tru Hendrickson  MRN: [id number]  YOB: 1950  Date of evaluation: 4/12/2019  Time:  7:51 AM     Procedure Summary     Date:  04/11/19 Room / Location:  [place] [id number] / [facility] OR    Anesthesia Start:  1116 Anesthesia Stop:  1301    Procedure:  DIAGNOSTIC LAPAROSCOPY, LYSIS OF ADHESIONS, LAPARASCOPIC JEJUNOSTOMY TUBE INSERTION, ESOPGASCOPY - GI UNIT SCHEDULED. (N/A ) Diagnosis:  (DYSPHAGIA)    Surgeon:  Sherrilyn Cabot, MD Responsible Provider:  Maddi Shafer MD    Anesthesia Type:  general ASA Status:  Not recorded          Anesthesia Type: general    Rogelio Phase I: Rogelio Score: 8    Rogelio Phase II:      Last vitals: Reviewed and per EMR flowsheets.        Anesthesia Post Evaluation    Patient location during evaluation: floor  Patient participation: complete - patient cannot participate  Level of consciousness: sleepy but conscious  Pain score: 0  Airway patency: patent  Nausea & Vomiting: no vomiting  Complications: no  Cardiovascular status: hemodynamically stable  Respiratory status: acceptable  Hydration status: stable

## 2019-04-13 NOTE — CARE COORDINATION
[facility] Update Call    2019    Patient: Mark Sierra Patient : 1950   MRN: <[id number]>  Reason for Admission: 3/ [facility] Acute encephalopathy, Dysphagia, Hyperkalemia, Acute ischemic stroke.   Discharge Date: 19 RARS: Readmission Risk Score: 17  CM: 4  [facility]  PCP: Tripp Paulino DO     Care Transitions Post Acute Facility Update    Care Transitions Interventions  Post Acute Facility:  [facility]  Post Acute Facility Update

## 2021-07-06 NOTE — CARE COORDINATION
Addended by: SERGE HERNANDEZ on: 7/6/2021 03:57 PM     Modules accepted: Orders     Pt remains intubated/sedated. Off levophed/started dobutamine. Possible plan for extubation Tuesday. Awaiting pt readiness for therapy evaluations. [facility] following as pt currently resides in one of their Assisted Living apartments.

## 2022-01-21 NOTE — PROGRESS NOTES
Problem: Activity Intolerance  Goal: # Functional status is maintained or returned to baseline  Outcome: Outcome Not Met, Continue to Monitor  Goal: # Tolerates activity for d/c setting with no clinical problems  Outcome: Outcome Not Met, Continue to Monitor   Patient continues to participate with therapies.   Daily Progress Note  Neuro Critical Care    Patient Name: Anderson Goddard  Patient : 1950  Room/Bed: [facility]/3208-71  Allergies: Allergies   Allergen Reactions    Other Shortness Of Breath     Sawdust.       Ace Inhibitors Other (See Comments)     Hypotension, dizziness.  Beta Adrenergic Blockers Other (See Comments)     Hypotension, dizziness.  Adhesive Tape Rash     Problem List:   Patient Active Problem List   Diagnosis    Elevated fasting glucose    Nonischemic cardiomyopathy (HCC)    Hyperlipidemia    Anxiety    History of tobacco abuse    Fibrocystic breast disease    Mild mitral regurgitation    Sick sinus syndrome (HCC)    Automatic implantable cardioverter-defibrillator in situ    Cerebral infarction ([address]    Superior mesenteric vein thrombosis    Functional urinary incontinence    Expressive aphasia    Stroke-like symptoms    Acute encephalopathy    History of cerebral infarction    History of implantable cardioverter-defibrillator (ICD) placement    Hiatal hernia    Chronic atrial fibrillation ([address]    Acute ischemic stroke ([address]    Aspiration pneumonia of both upper lobes ([address]         INTERVAL HISTORY    Initial Presentation (Admitted 3/28): The patient is a 81 y.o. female presented with presents with concern for CVA.  Transfer from [facility] ED, presented with NIH 21.  Last known well 1200 3/27/18.  Previous records indicate pt found by family w/ AMS, aphasia, right sided deficits.  PMH of previous CVA, afib as on outpatient Xarelto but pt discontinued a few months.  Previous dysarthria, no reported residual weakness. Symptom onset has been constant for a time period of 13 hour(s). Severity is described as moderate to severe. Course of her symptoms over time is constant since onset. CT head, CTA head/neck obtain prior to arrival.     Hospital Course:    3/28: intubated due to impending respiratory failure.    3/29: Intubated overnight for impending respiratory Failure. Copious secreations, remains on zosyn, zithromax. Over the last 24 hours, patient was hypotensive overnight. Received bolus of IV fluids. No good response. Was started on norepinephrine drip. Blood pressure has been controlled with 1 to 2 mics . Troponins are trending down.     CURRENT MEDICATIONS:  SCHEDULED MEDICATIONS:   potassium & sodium phosphates  1 packet Oral 4x Daily    potassium chloride  20 mEq Per NG tube BID    scopolamine  1 patch Transdermal Q72H    aspirin  81 mg Oral Daily    potassium chloride  40 mEq Per NG tube Daily    atorvastatin  40 mg Oral Nightly    sodium chloride flush  10 mL Intravenous 2 times per day    famotidine (PEPCID) injection  20 mg Intravenous Daily    azithromycin  500 mg Intravenous Q24H    heparin (porcine)  5,000 Units Subcutaneous 3 times per day    lidocaine   Topical Once    piperacillin-tazobactam  3.375 g Intravenous Q8H     CONTINUOUS INFUSIONS:   norepinephrine Stopped (19 0723)    sodium chloride 75 mL/hr at 19 2235    propofol 10 mcg/kg/min (19 194)     PRN MEDICATIONS:   sodium chloride flush, magnesium hydroxide, ondansetron, acetaminophen, hydrALAZINE    VITALS:  Temperature Range: Temp: 97.3 °F (36.3 °C) Temp  Av.7 °F (37.1 °C)  Min: 97.3 °F (36.3 °C)  Max: 99.7 °F (37.6 °C)  BP Range: Systolic (60UEG), RED:84 , Min:74 , ZDQ:394     Diastolic (68UXT), HQJ:93, Min:22, Max:91    Pulse Range: Pulse  Av.9  Min: 70  Max: 100  Respiration Range: Resp  Av.9  Min: 14  Max: 25  Current Pulse Ox: SpO2: 100 %  24HR Pulse Ox Range: SpO2  Av.4 %  Min: 92 %  Max: 100 %  Patient Vitals for the past 12 hrs:   BP Temp Temp src Pulse Resp SpO2   19 1748 111/72 -- -- 79 17 --   19 1734 (!) 97/50 -- -- 83 17 --   19 1719 (!) 117/47 -- -- 86 17 --   19 1703 110/77 -- -- 91 18 --   19 1649 (!) 108/44 -- -- 83 17 --   19 1635 109/61 -- -- 96 18 --   19 1618 -- -- -- 85 16 --   03/30/19 1617 105/62 -- -- 88 16 --   03/30/19 1602 96/72 -- -- 94 16 --   03/30/19 1549 (!) 98/47 -- -- 91 18 --   03/30/19 1533 101/72 -- -- 96 20 --   03/30/19 1531 -- -- -- 100 22 100 %   03/30/19 1519 119/71 -- -- 93 22 --   03/30/19 1504 105/60 -- -- 83 20 --   03/30/19 1433 (!) 87/55 -- -- 77 18 --   03/30/19 1419 (!) 83/45 -- -- 79 17 --   03/30/19 1418 (!) 78/45 -- -- 75 18 --   03/30/19 1417 (!) 78/45 -- -- 75 17 --   03/30/19 1403 (!) 89/26 -- -- 70 17 --   03/30/19 1348 (!) 93/42 -- -- 71 18 --   03/30/19 1333 (!) 88/35 -- -- 70 16 --   03/30/19 1319 (!) 82/27 -- -- 71 16 --   03/30/19 1318 -- -- -- 71 16 --   03/30/19 1248 (!) 85/22 -- -- 75 20 --   03/30/19 1232 (!) 88/43 -- -- 82 18 --   03/30/19 1222 (!) 93/44 -- -- 85 25 --   03/30/19 1220 (!) 74/29 -- -- 70 17 --   03/30/19 1218 (!) 83/25 -- -- 70 18 --   03/30/19 1205 -- -- -- 70 20 99 %   03/30/19 1204 95/62 -- -- 70 20 --   03/30/19 1150 (!) 115/49 -- -- 83 22 --   03/30/19 1133 (!) 101/43 -- -- 76 17 --   03/30/19 1119 (!) 102/45 -- -- 88 17 --   03/30/19 1118 (!) 102/45 -- -- 78 18 --   03/30/19 1103 116/63 -- -- 83 18 --   03/30/19 1101 -- 97.3 °F (36.3 °C) Axillary -- -- --   03/30/19 1050 109/64 -- -- 87 17 --   03/30/19 1032 117/69 -- -- 91 16 --   03/30/19 1019 (!) 98/49 -- -- 70 18 --   03/30/19 1002 108/60 -- -- 89 14 --   03/30/19 0948 103/63 -- -- 75 18 --   03/30/19 0947 103/63 -- -- 83 16 --   03/30/19 0933 108/73 -- -- 78 18 --   03/30/19 0918 114/65 -- -- 79 19 --   03/30/19 0904 (!) 98/39 -- -- 71 17 --   03/30/19 0848 (!) 112/50 -- -- 88 19 --   03/30/19 0833 (!) 97/44 -- -- 79 17 --   03/30/19 0818 (!) 94/35 -- -- 70 18 --   03/30/19 0802 98/60 -- -- 70 16 --   03/30/19 0753 -- -- -- 70 16 98 %   03/30/19 0748 (!) 94/53 -- -- 70 17 --   03/30/19 0734 (!) 84/41 -- -- 71 17 --   03/30/19 0726 -- 97.6 °F (36.4 °C) Oral 86 22 98 %   03/30/19 0719 (!) 125/43 -- -- 86 16 --   03/30/19 0703 (!) 108/48 -- -- 85 20 --     Estimated body mass index is 18.62 kg/m² as calculated from the following:    Height as of this encounter: 5' 4\" (1.626 m). Weight as of this encounter: 108 lb 7.5 oz (49.2 kg).  []<16 Severe malnutrition  []16-16.99 Moderate malnutrition  []17-18.49 Mild malnutrition  [x]18.5-24.9 Normal  []25-29.9 Overweight (not obese)  []30-34.9 Obese class 1 (Low Risk)  []35-39.9 Obese class 2 (Moderate Risk)  []?40 Obese class 3 (High Risk)    RECENT LABS:   Lab Results   Component Value Date    WBC 9.6 03/30/2019    HGB 11.9 03/30/2019    HCT 37.2 03/30/2019     (L) 03/30/2019    CHOL 111 03/29/2019    TRIG 69 03/29/2019    HDL 57 03/29/2019    ALT 14 03/27/2019    AST 28 03/27/2019     03/30/2019    K 3.5 (L) 03/30/2019     (H) 03/30/2019    CREATININE 0.72 03/30/2019    BUN 12 03/30/2019    CO2 19 (L) 03/30/2019    INR 1.0 03/28/2019    LABA1C 5.3 02/28/2017     24 HOUR INTAKE/OUTPUT:    Intake/Output Summary (Last 24 hours) at 3/30/2019 1855  Last data filed at 3/30/2019 1600  Gross per 24 hour   Intake 1780 ml   Output 600 ml   Net 1180 ml       RADIOLOGY:   Ct Head Wo Contrast    Result Date: 3/29/2019  EXAMINATION: CT OF THE HEAD WITHOUT CONTRAST  3/29/2019 4:46 am TECHNIQUE: CT of the head was performed without the administration of intravenous contrast. Dose modulation, iterative reconstruction, and/or weight based adjustment of the mA/kV was utilized to reduce the radiation dose to as low as reasonably achievable. COMPARISON: CT head done September 28, 2017.  HISTORY: ORDERING SYSTEM PROVIDED HISTORY: left hemiparesis possibly acute, eval for acute ischemia (unable to get MRI due to PPM/AICD) TECHNOLOGIST PROVIDED HISTORY: Ordering Physician Provided Reason for Exam: eval for acute ischemia Acuity: Unknown Type of Exam: Unknown FINDINGS: BRAIN/VENTRICLES: Moderate-sized patchy area of hypoattenuation with loss of the gray-white matter interface in the medial right frontal parietal region may relate to acute infarct. Large areas of encephalomalacia in the bilateral cerebral hemispheres are unchanged from the prior study and likely relate to bilateral middle cerebral artery territory infarcts. Small left caudate remote lacunar infarct. No acute intracranial hemorrhage. No mass effect or midline shift. No abnormal extra-axial fluid collection. The basal cisterns are patent. Ill-defined periventricular white matter hypoattenuation, commonly seen in the setting of chronic small vessel ischemic disease. Generalized cerebral and cerebellar volume loss. No hydrocephalus. ORBITS: The visualized portion of the orbits demonstrate no acute abnormality. SINUSES: The visualized paranasal sinuses and mastoid air cells demonstrate no acute abnormality. Partially visualized nasogastric tube. SOFT TISSUES/SKULL:  No acute abnormality of the visualized skull or soft tissues. Redemonstration of hyperostosis frontalis. Moderate-sized patchy area of hypoattenuation in the medial right frontal parietal region suggests acute infarct. No acute intracranial hemorrhage. Redemonstration of large areas of encephalomalacia in the bilateral cerebral hemispheres which are similar to the prior study and likely relate to old bilateral middle cerebral artery territory infarct. Chronic small vessel ischemic disease. Small left caudate remote lacunar infarct. Ct Head Wo Contrast    Result Date: 3/28/2019  Ordering Provider: [address] [place] [address]  Name:   Ramesh Delatorre         : 1950  Unit #:   [id number]         Age:  81  Account #:     [de-identified]      Attending Physician:     Pt Location:   ER   Date of Service:   19    CT HEAD W/O CONTRAST    HISTORY:  Stroke-like symptoms. Study:  Head CT without contrast dated 2019.  2137 hours       TECHNIQUE: Axial images were obtained through the brain without IV   contrast. Images were viewed in soft tissue and bone windows.  Individualized HISTORY:  Unresponsive. Left-sided body weakness. History of strokes     Study:  Portable chest dated 03/27/2019.  2219 hours     Findings:  Pacer and defibrillator wires are evident. The stomach is  largely intrathoracic. Eventration of the diaphragm is evident. Basilar  atelectasis is present. The rest of the lungs are clear     IMPRESSION:    Very large hiatal hernia. Basilar atelectasis. EXAM/ORDER VERIFICATION: Y  PT/FAMILY VERBALIZES UNDERSTANDING OF EXAM Y  PT SHIELDED Y  Is pt pregnant? LMP   TECH INITIALS MV               COMMENTS       Electronically Signed by:  Ras Montgomery M.D.  03/28/19 1133  ________________________________________  Ras Montgomery M.D. Date Dict:  03/28/19 Fredy Land M.D. Date Trans: 03/28/19 0839 [facility]    [id number]    cc: Jovany CANO Xr Chest Portable    Result Date: 3/28/2019  EXAMINATION: SINGLE XRAY VIEW OF THE CHEST 3/28/2019 5:55 am COMPARISON: March 27, 2019. December 25, 2016. HISTORY: ORDERING SYSTEM PROVIDED HISTORY: Concern for aspiriation TECHNOLOGIST PROVIDED HISTORY: Concern for aspiriation FINDINGS: Stable left pectoral trans venous cardiac pacer/ICD. Low left lung volume with elevation of the left hemidiaphragm. Bilateral basilar predominant heterogeneous opacities are similar to the prior study. No definite pleural effusion or pneumothorax. Grossly stable cardiomediastinal silhouette and great vessels. Overall, no significant interval change with redemonstration of low left lung volume with elevation of the left hemidiaphragm and bilateral basilar predominant heterogeneous opacities. Correlate for possible hiatal or diaphragmatic hernia.      Vascular Carotid Duplex Bilateral    Result Date: 3/28/2019    [facility]  Vascular Carotid Procedure   [facility]  Date of Mayra Pill   Date of Birth 1950 Gender                  Female   Age           81 year(s)  Race                       Room Number   5617   Corporate ID  [id number]  #   Mei Gonzalez  [de-identified]  #   MR #          [id number]     Sonographer             Nahed Riley   Accession #   [id number]   Interpreting Physician  Irvin Cid   Referring                 Referring Physician     Dwaine Santacruz MD  Nurse  Practitioner  Procedure Type of Study:   Cerebral: Carotid, Carotid Scan Bilateral.  Indications for Study:Carotid stenosis. Blood Pressure:Left arm 99/56 mmHg. Patient Status: In Patient. Limitation reason:BP not taken on right arm due to I.V. .  Conclusions   Summary   Mild 16-49% stenosis of the right internal carotid artery. Hemodynamically normal left carotid artery scan. Patent vertebral arteries bilaterally with antegrade flow. Signature   ----------------------------------------------------------------  Electronically signed by Angely Kaye(Sonographer) on  03/28/2019 11:40 AM  ----------------------------------------------------------------   ----------------------------------------------------------------  Electronically signed by Masood Allen(Interpreting  physician) on 03/28/2019 09:23 PM  ----------------------------------------------------------------  Findings:   Right Impression:                        Left Impression:  The common carotid artery has a smooth   The common carotid artery has a  homogeneous plaque causing a <50%        smooth homogeneous plaque causing  stenosis. a <50% stenosis. The carotid bulb has an irregular        The carotid bulb has a smooth  heterogeneous plaque causing a <50%      homogeneous plaque causing a <50%  stenosis. stenosis. The internal carotid artery has a smooth The internal carotid artery is  homogeneous plaque causing a <50%        normal.  stenosis based on velocities. The external carotid artery has a  The external carotid artery has a smooth smooth homogeneous plaque causing  homogeneous plaque causing a <50%        a <50% stenosis. stenosis. The vertebral artery is patent  The vertebral artery is patent with      with antegrade flow. antegrade flow. Allergies   - Allergy: Ace Inhibitors(Drug). - Allergy:Tape(Miscellaneous). - Allergy:*Unlisted(Drug). Comments:beta adrenergic blockers Velocities are measured in cm/s ; Diameters are measured in cm Carotid Right Measurements +------------+-------+-------+--------+-------+------------+---------------+ ! Location    ! PSV    ! EDV    ! Angle   ! RI     !%Stenosis   ! Tortuosity     ! +------------+-------+-------+--------+-------+------------+---------------+ ! Prox CCA    !72.5   !14.9   !60      !0.79   !            !               ! +------------+-------+-------+--------+-------+------------+---------------+ ! Mid CCA     !73.3   !14.1   ! 60      !0.81   !            !               ! +------------+-------+-------+--------+-------+------------+---------------+ ! Dist CCA    !54.1   !11.4   !60      !0.79   !            !               ! +------------+-------+-------+--------+-------+------------+---------------+ ! Bulb        !51.7   !14.1   ! 60      !0.73   !            !               ! +------------+-------+-------+--------+-------+------------+---------------+ ! Prox ICA    !38.1   !8.87   !60      !0.77   !            !               ! +------------+-------+-------+--------+-------+------------+---------------+ ! Mid ICA     !51.5   !18.3   ! 60      !0.64   !            !               ! +------------+-------+-------+--------+-------+------------+---------------+ ! Dist ICA    ! 40.7   !17.3   ! 60      !0.57   !            !               ! +------------+-------+-------+--------+-------+------------+---------------+ ! Prox ECA    !67.6   !10.9   !60      !0.84   !            ! ! +------------+-------+-------+--------+-------+------------+---------------+ ! Vertebral   !31.9   !8.87   !60      !0.72   !            !               ! +------------+-------+-------+--------+-------+------------+---------------+   - There is antegrade vertebral flow noted on the right side. - Additional Measurements:ICAPSV/CCAPSV 0.71. ICAEDV/CCAEDV 1.23. Carotid Left Measurements +------------+-------+-------+--------+-------+------------+---------------+ ! Location    ! PSV    ! EDV    ! Angle   ! RI     !%Stenosis   ! Tortuosity     ! +------------+-------+-------+--------+-------+------------+---------------+ ! Prox CCA    !63.1   !8.76   !60      !0.86   !            !               ! +------------+-------+-------+--------+-------+------------+---------------+ ! Mid CCA     !59.9   !8.41   ! 60      !0.86   !            !               ! +------------+-------+-------+--------+-------+------------+---------------+ ! Dist CCA    !45.9   !9.81   !60      !0.79   !            !               ! +------------+-------+-------+--------+-------+------------+---------------+ ! Bulb        !43.4   !9.11   ! 60      !0.79   !            !               ! +------------+-------+-------+--------+-------+------------+---------------+ ! Prox ICA    ! 34.7   !9.07   !60      !0.74   !            !               ! +------------+-------+-------+--------+-------+------------+---------------+ ! Mid ICA     !39     !13.6   ! 60      !0.65   !            !               ! +------------+-------+-------+--------+-------+------------+---------------+ ! Dist ICA    !41     !9.05   !60      !0.78   !            !               ! +------------+-------+-------+--------+-------+------------+---------------+ ! Prox ECA    !71.3   !9.8    ! 60      !0.86   !            !               ! +------------+-------+-------+--------+-------+------------+---------------+ ! Vertebral   !39.9   !4.74   !60      !0.88   !            !               ! +------------+-------+-------+--------+-------+------------+---------------+   - There is antegrade vertebral flow noted on the left side. - Additional Measurements:ICAPSV/CCAPSV 0.65. ICAEDV/CCAEDV 1.55. Ir Place Ng Tube By Dr Morgan Brown    Result Date: 3/28/2019  PROCEDURE: XR PLACE NASOGASTRIC TUBE PHYS 3/28/2019 HISTORY: ORDERING SYSTEM PROVIDED HISTORY: patient has hiatal hernia TECHNOLOGIST PROVIDED HISTORY: patient has hiatal hernia TECHNIQUE: With the patient supine, an 18 Persian sump tube NG was inserted via the left nares, and manipulated into the intrathoracic stomach, verified with a small contrast injection. CONTRAST: Conray 43-10 mL used. SEDATION: None FLUOROSCOPY DOSE AND TYPE OR TIME AND EXPOSURES: Fluoroscopy time-2.3 minutes D AP-183 FINDINGS: Images show various stages of NG tube placement; following contrast injection, the tip and side hole are located within the stomach with contrast demonstrating the intrathoracic position. Successful fluoroscopy guided NG tube placement. NG tube is ready for use at this time. Ct Chest Abdomen Pelvis W Contrast    Result Date: 3/28/2019  EXAMINATION: CT OF THE CHEST, ABDOMEN, AND PELVIS WITH CONTRAST 3/28/2019 11:41 am TECHNIQUE: CT of the chest, abdomen and pelvis was performed with the administration of intravenous contrast. Multiplanar reformatted images are provided for review. Dose modulation, iterative reconstruction, and/or weight based adjustment of the mA/kV was utilized to reduce the radiation dose to as low as reasonably achievable. COMPARISON: CT abdomen and pelvis 12/19/2017 HISTORY: ORDERING SYSTEM PROVIDED HISTORY: abdominal distension, hiatal hernia, possible pneumonia TECHNOLOGIST PROVIDED HISTORY: Ordering Physician Provided Reason for Exam: abd pain Acuity: Unknown Type of Exam: Unknown FINDINGS: Chest: Mediastinum: The heart and great vessels appear unchanged. Left subclavian pacing device present.   No enlarged or suspicious-appearing lymph nodes. No pericardial effusion. Large left diaphragmatic hernia is noted which extends into the right hemithorax. Lungs/pleura: Scattered airspace disease in the posterior right upper lobe with interstitial and ground-glass nodules are noted. Chronic but more prominent opacities in the visualized left lower lobe are noted as well. This may in part represent compressive atelectasis. Scattered interstitial and ground-glass opacities are also more prominent in the right lung base with findings of compressive atelectasis. Debris is present in the trachea and proximal right mainstem bronchus. No effusion. Soft Tissues/Bones: No acute osseous abnormality identified. Abdomen/Pelvis: Organs: Cholelithiasis without CT evidence for acute cholecystitis. The liver, pancreas, spleen, adrenals and kidneys reveal no acute findings. Probable scarring in the left renal kidney appears unchanged. Right renal cyst. GI/Bowel: Large left diaphragmatic hernia contains the stomach with organoaxial rotation. Loops of colon and small bowel are also present. The pancreas is also herniated. No inflammatory change or evidence for bowel obstruction. Patulous segment of the sigmoid colon with mild stool burden in the distal colon is a chronic appearance. Pelvis: No acute findings. Excretion of contrast in the bladder is noted. Peritoneum/Retroperitoneum: No free air or free fluid. No lymphadenopathy. The aorta is normal in caliber. Calcified atheromatous plaque is present. Bones/Soft Tissues: Osteopenia. Advanced facet arthropathy in the lower lumbar spine with grade 1 anterolisthesis of L4. Mild disc disease at L1-2. Thoracolumbar levoscoliosis is noted. Moderate degenerative change in the hips. Scattered interstitial and ground-glass opacities are more prominent in the lungs bilaterally. Debris in the trachea and right mainstem bronchus is also noted.   Constellation of findings are suspicious for aspiration pneumonitis. Large left diaphragmatic hernia, as seen previously. Organo-axial rotation of the stomach is noted. Chronic findings, as above. Cta Head W Contrast    Result Date: 3/28/2019  Ordering Provider: [facility]  Name:   Na Mcallister         : 1950  Unit #:   [id number]         Age:  81  Account #:     [de-identified]      Attending Physician:     Pt Location:   ER   Date of Service:   19    CTA HEAD W/ CONTRAST; CTA NECK W/ CONTRAST    HISTORY:  Stroke-like symptoms       Study:  CTA of the head and neck with contrast dated 2019.  2236 hours       TECHNIQUE:   Axial images were obtained through the brain after the administration of 100   ml Visipaque 320. Coronal, sagittal, and 3-D reconstruction imaging was   performed. Individualized dose optimization techniques were utilized. After   this, axial images were obtained from the base of the skull down to the   aortic arch. 100 ml Visipaque 320 was injected. Coronal, sagittal, and 3-D   reconstruction imaging was performed. Individualized dose optimization   techniques were utilized. Findings: The common carotid arteries are normal in caliber and free of   plaque. The carotid bulbs and internal carotid arteries are normal in caliber without   evidence of significant plaque or stenosis. Within the brain, cavernous carotids, supraclinoid internal carotids, middle   cerebral and anterior cerebral branches are normal in caliber without   evidence of stricture, aneurysm or vascular malformation. There is decreased   vascular flow in the location of the large middle cerebral territory infarcts   which are old. Basilar, posterior cerebral and superior cerebellar branches are normal in   caliber without evidence of aneurysm or vascular malformation. The aorta and pulmonary arteries are normal in caliber. Eventration of the   left hemidiaphragm is present.   The stomach is largely intrathoracic. Associated basilar atelectasis is present on the left. IMPRESSION:   Unremarkable CTA of the head. Unremarkable CTA of the neck. EXAM/ORDER VERIFICATION: Y  PT/FAMILY VERBALIZES UNDERSTANDING OF EXAM Y  PT SHIELDED N  Is pt pregnant? LMP   TECH INITIALS MV               COMMENTS       Electronically Signed by:  Hillary Taylor M.D.  03/28/19 1133  ________________________________________  Hillary Taylor M.D. Date Dict:  03/28/19 Rob Fung M.D. Date Trans: 03/28/19 0828 [facility]    [phone]    cc: Ke CANO        Labs and Images reviewed with:  [x] Dr. Tim Bellamy. Joe    [] Dr. Ceferino Blanton  [] Dr. Magalys Renee  [] There are no new interval images to review. PHYSICAL EXAM       CONSTITUTIONAL:  Intubated and sedated. spo movements Right side upper>lower. Left side hemiplegia   HEAD:  normocephalic, atraumatic    EYES:  PERRL   ENT:  Moist mucous membranes   NECK:  supple, symmetric   LUNGS:  Equal air entry bilaterally. Copious secretions    CARDIOVASCULAR:  normal s1 / s2, RRR,    ABDOMEN:  Soft, no rigidity   NEUROLOGIC:  Mental Status:  Intubated, spo eye opening off sedation, left hemiplegia. , not following commands             Cranial Nerves:    III: Pupils:  equal, round, reactive    Motor Exam:    Spontaneously moving right upper>lower  Left hemiplegia     Sensory:        Deep Tendon Reflexes:      Right Knee:  2+  Left Knee:  2+      Clonus:  absent           DRAINS:  [x] There are no drains for Neuro Critical Care to monitor at this time. ASSESSMENT AND PLAN:     This is a 81 y.o. female transfer from [facility] ED after presenting with aphasia, right sided deficits. Last known well 1200 3/27/19. PMH of previous CVA with residual dysarthria. CTH, CTA head/neck negative.  Stroke alert called on arrival. Neuro-endovascular surgery aware prior to arrival    NEUROLOGIC: Left hemiplegia 2/2 acute CVA  - CT head, CTA head, neck obtained and reviewed. MRI unable to obtain due to AICD/pacer  -CT head  3/29: mod sized are of hypoatteneution medial right frontal parietal suggestive of acute infarct  - Goal -180  - Neuro checks per protocol  -Discontinue EEG     CARDIOVASCULAR:  - Goal -180  -On norepinephrine drip  - Troponin elevated, trend  -ASA, statin  - EKG shows new onset lateral T-wave changes. Cardiology consulted and agreed with heparin gtt when approved by Neuro-endovascular NS.   - Will plan to start Heparin gtt on   - Continue telemetry     PULMONARY:  - Intubated 3/28  - Daily AB.427/33.1/80.1/21.8     RENAL/FLUID/ELECTROLYTE:  - BUN 12/ Creatinine 0.74  - Monitor urine output  - IVF: NS @ 75 ml/hr  - Replace electrolytes PRN  - Daily BMP     GI/NUTRITION:  NUTRITION:  Tube feeding  -Dietary consult to start tube feeds  - GI prophylaxis: pepcid     ID:  - Tmax: 37.7, no leukocytosis  - Procal 0.19  - Cultures NGTD  -ABX on zosyn, zithromax  - Continue to monitor for fevers  - Daily CBC     HEME:   - H&H   - Platelets 261  - Daily CBC     ENDOCRINE:  - Continue to monitor blood glucose, goal <180    OTHER:  - PT/OT/ST   - Code Status: Full     PROPHYLAXIS:  Stress ulcer: PPI     DVT PROPHYLAXIS:  - SCD sleeves - Thigh High   -Heparin       DISPOSITION:  [x] To remain ICU: For ventilation management      We will continue to follow along. For any changes in exam or patient status please contact Neuro Critical Care.       Cortney Bruner MD  Neuro Critical Care  Pager [phone]  3/30/2019     6:55 PM

## (undated) DEVICE — GLOVE SURG SZ 6 THK91MIL LTX FREE SYN POLYISOPRENE ANTI

## (undated) DEVICE — TROCAR ENDOSCP L100MM DIA5MM BLDELSS STBL SL OBT RADLUC

## (undated) DEVICE — TOWEL,OR,DSP,ST,NATURAL,DLX,4/PK,20PK/CS: Brand: MEDLINE

## (undated) DEVICE — PACK LAP BASIC

## (undated) DEVICE — SUTURE MCRYL SZ 4-0 L18IN ABSRB UD L16MM PC-3 3/8 CIR PRIM Y845G

## (undated) DEVICE — DEVICE TRCR 12X9X3IN WHT CLSR DISP OMNICLOSE

## (undated) DEVICE — TAPE,CLOTH/SILK,CURAD,3"X10YD,LF,40/CS: Brand: CURAD

## (undated) DEVICE — CHLORAPREP 26ML ORANGE

## (undated) DEVICE — BINDER ABD UNISX 9IN 62IN L AND XL UNIV

## (undated) DEVICE — TROCAR: Brand: KII FIOS FIRST ENTRY

## (undated) DEVICE — Z INACTIVE USE 2735373 APPLICATOR FBR LAIN COT WOOD TIP ECONOMICAL

## (undated) DEVICE — SKIN AFFIX SURG ADHESIVE 72/CS 0.55ML: Brand: MEDLINE

## (undated) DEVICE — GLOVE SURG SZ 65 THK91MIL LTX FREE SYN POLYISOPRENE

## (undated) DEVICE — SCISSOR SURG METZ CRV TIP

## (undated) DEVICE — TROCAR ENDOSCP L100MM DIA5MM BLDELSS STBL SL THRD OPT VW

## (undated) DEVICE — TUBE FEED 14FR BLLN 7-10ML L45CM JEJU SIL INFL INT SECUR

## (undated) DEVICE — ELECTRODE LAPAROSCOPIC LHOOK

## (undated) DEVICE — GLOVE ORANGE PI 7   MSG9070

## (undated) DEVICE — SOLUTION ANTIFOG VIS SYS CLEARIFY LAPSCP

## (undated) DEVICE — HYPODERMIC SAFETY NEEDLE: Brand: MAGELLAN

## (undated) DEVICE — SPONGE GZ W3XL3IN 4 PLY RAYON POLY STD NONWOVEN

## (undated) DEVICE — TAPE ADH W3INXL10YD WHT COT WVN BK POWERFUL RUB BASE HIGHLY